# Patient Record
Sex: MALE | Race: WHITE | Employment: FULL TIME | URBAN - METROPOLITAN AREA
[De-identification: names, ages, dates, MRNs, and addresses within clinical notes are randomized per-mention and may not be internally consistent; named-entity substitution may affect disease eponyms.]

---

## 2020-03-05 ENCOUNTER — HOSPITAL ENCOUNTER (INPATIENT)
Age: 55
LOS: 2 days | Discharge: HOME | DRG: 603 | End: 2020-03-07
Attending: INTERNAL MEDICINE | Admitting: INTERNAL MEDICINE
Payer: COMMERCIAL

## 2020-03-05 ENCOUNTER — APPOINTMENT (OUTPATIENT)
Dept: ULTRASOUND IMAGING | Age: 55
End: 2020-03-05
Payer: COMMERCIAL

## 2020-03-05 ENCOUNTER — HOSPITAL ENCOUNTER (EMERGENCY)
Age: 55
Discharge: ANOTHER HOSPITAL | End: 2020-03-05
Attending: EMERGENCY MEDICINE
Payer: COMMERCIAL

## 2020-03-05 VITALS
WEIGHT: 315 LBS | OXYGEN SATURATION: 97 % | RESPIRATION RATE: 16 BRPM | TEMPERATURE: 99 F | BODY MASS INDEX: 45.1 KG/M2 | SYSTOLIC BLOOD PRESSURE: 108 MMHG | HEART RATE: 84 BPM | DIASTOLIC BLOOD PRESSURE: 62 MMHG | HEIGHT: 70 IN

## 2020-03-05 PROBLEM — E11.65 TYPE 2 DIABETES MELLITUS WITH HYPERGLYCEMIA (HCC): Status: ACTIVE | Noted: 2020-03-05

## 2020-03-05 PROBLEM — E78.5 HYPERLIPIDEMIA: Status: ACTIVE | Noted: 2020-03-05

## 2020-03-05 PROBLEM — E66.01 CLASS 3 SEVERE OBESITY WITH SERIOUS COMORBIDITY AND BODY MASS INDEX (BMI) OF 45.0 TO 49.9 IN ADULT (HCC): Status: ACTIVE | Noted: 2020-03-05

## 2020-03-05 PROBLEM — L03.90 CELLULITIS: Status: ACTIVE | Noted: 2020-03-05

## 2020-03-05 PROBLEM — I10 ESSENTIAL HYPERTENSION: Status: ACTIVE | Noted: 2020-03-05

## 2020-03-05 PROBLEM — E78.2 MIXED HYPERLIPIDEMIA: Status: ACTIVE | Noted: 2020-03-05

## 2020-03-05 PROBLEM — E66.813 CLASS 3 SEVERE OBESITY WITH SERIOUS COMORBIDITY AND BODY MASS INDEX (BMI) OF 45.0 TO 49.9 IN ADULT: Status: ACTIVE | Noted: 2020-03-05

## 2020-03-05 LAB
ABSOLUTE EOS #: 0.08 K/UL (ref 0–0.4)
ABSOLUTE IMMATURE GRANULOCYTE: ABNORMAL K/UL (ref 0–0.3)
ABSOLUTE LYMPH #: 0.32 K/UL (ref 1–4.8)
ABSOLUTE MONO #: 0.24 K/UL (ref 0.1–0.8)
ANION GAP SERPL CALCULATED.3IONS-SCNC: 15 MMOL/L (ref 9–17)
BASOPHILS # BLD: 0 % (ref 0–2)
BASOPHILS ABSOLUTE: 0 K/UL (ref 0–0.2)
BUN BLDV-MCNC: 18 MG/DL (ref 6–20)
BUN/CREAT BLD: ABNORMAL (ref 9–20)
CALCIUM SERPL-MCNC: 8.9 MG/DL (ref 8.6–10.4)
CHLORIDE BLD-SCNC: 96 MMOL/L (ref 98–107)
CO2: 23 MMOL/L (ref 20–31)
CREAT SERPL-MCNC: 0.93 MG/DL (ref 0.7–1.2)
DIFFERENTIAL TYPE: ABNORMAL
EOSINOPHILS RELATIVE PERCENT: 1 % (ref 1–4)
GFR AFRICAN AMERICAN: >60 ML/MIN
GFR NON-AFRICAN AMERICAN: >60 ML/MIN
GFR SERPL CREATININE-BSD FRML MDRD: ABNORMAL ML/MIN/{1.73_M2}
GFR SERPL CREATININE-BSD FRML MDRD: ABNORMAL ML/MIN/{1.73_M2}
GLUCOSE BLD-MCNC: 182 MG/DL (ref 75–110)
GLUCOSE BLD-MCNC: 204 MG/DL (ref 75–110)
GLUCOSE BLD-MCNC: 331 MG/DL (ref 70–99)
HCT VFR BLD CALC: 42.1 % (ref 41–53)
HEMOGLOBIN: 14.3 G/DL (ref 13.5–17.5)
IMMATURE GRANULOCYTES: ABNORMAL %
LACTIC ACID: 1.4 MMOL/L (ref 0.5–2.2)
LYMPHOCYTES # BLD: 4 % (ref 24–44)
MCH RBC QN AUTO: 31.6 PG (ref 26–34)
MCHC RBC AUTO-ENTMCNC: 34 G/DL (ref 31–37)
MCV RBC AUTO: 92.9 FL (ref 80–100)
MONOCYTES # BLD: 3 % (ref 1–7)
MORPHOLOGY: NORMAL
NRBC AUTOMATED: ABNORMAL PER 100 WBC
PDW BLD-RTO: 14.1 % (ref 12.5–15.4)
PLATELET # BLD: 159 K/UL (ref 140–450)
PLATELET ESTIMATE: ABNORMAL
PMV BLD AUTO: 7.4 FL (ref 6–12)
POTASSIUM SERPL-SCNC: 3.9 MMOL/L (ref 3.7–5.3)
RBC # BLD: 4.53 M/UL (ref 4.5–5.9)
RBC # BLD: ABNORMAL 10*6/UL
SEG NEUTROPHILS: 92 % (ref 36–66)
SEGMENTED NEUTROPHILS ABSOLUTE COUNT: 7.26 K/UL (ref 1.8–7.7)
SODIUM BLD-SCNC: 134 MMOL/L (ref 135–144)
VANCOMYCIN TROUGH DATE LAST DOSE: ABNORMAL
VANCOMYCIN TROUGH DOSE AMOUNT: ABNORMAL
VANCOMYCIN TROUGH TIME LAST DOSE: ABNORMAL
VANCOMYCIN TROUGH: 6.4 UG/ML (ref 10–20)
WBC # BLD: 7.9 K/UL (ref 3.5–11)
WBC # BLD: ABNORMAL 10*3/UL

## 2020-03-05 PROCEDURE — 93971 EXTREMITY STUDY: CPT

## 2020-03-05 PROCEDURE — 96365 THER/PROPH/DIAG IV INF INIT: CPT

## 2020-03-05 PROCEDURE — 96366 THER/PROPH/DIAG IV INF ADDON: CPT

## 2020-03-05 PROCEDURE — 1200000000 HC SEMI PRIVATE

## 2020-03-05 PROCEDURE — 82947 ASSAY GLUCOSE BLOOD QUANT: CPT

## 2020-03-05 PROCEDURE — 90686 IIV4 VACC NO PRSV 0.5 ML IM: CPT | Performed by: INTERNAL MEDICINE

## 2020-03-05 PROCEDURE — 6370000000 HC RX 637 (ALT 250 FOR IP): Performed by: EMERGENCY MEDICINE

## 2020-03-05 PROCEDURE — 2580000003 HC RX 258: Performed by: EMERGENCY MEDICINE

## 2020-03-05 PROCEDURE — 6370000000 HC RX 637 (ALT 250 FOR IP): Performed by: INTERNAL MEDICINE

## 2020-03-05 PROCEDURE — 99284 EMERGENCY DEPT VISIT MOD MDM: CPT

## 2020-03-05 PROCEDURE — G0008 ADMIN INFLUENZA VIRUS VAC: HCPCS | Performed by: INTERNAL MEDICINE

## 2020-03-05 PROCEDURE — 2580000003 HC RX 258: Performed by: NURSE PRACTITIONER

## 2020-03-05 PROCEDURE — 99222 1ST HOSP IP/OBS MODERATE 55: CPT | Performed by: NURSE PRACTITIONER

## 2020-03-05 PROCEDURE — 80048 BASIC METABOLIC PNL TOTAL CA: CPT

## 2020-03-05 PROCEDURE — 6360000002 HC RX W HCPCS: Performed by: INTERNAL MEDICINE

## 2020-03-05 PROCEDURE — 80202 ASSAY OF VANCOMYCIN: CPT

## 2020-03-05 PROCEDURE — 83605 ASSAY OF LACTIC ACID: CPT

## 2020-03-05 PROCEDURE — 36415 COLL VENOUS BLD VENIPUNCTURE: CPT

## 2020-03-05 PROCEDURE — 6370000000 HC RX 637 (ALT 250 FOR IP): Performed by: NURSE PRACTITIONER

## 2020-03-05 PROCEDURE — 85025 COMPLETE CBC W/AUTO DIFF WBC: CPT

## 2020-03-05 PROCEDURE — 87040 BLOOD CULTURE FOR BACTERIA: CPT

## 2020-03-05 PROCEDURE — 6360000002 HC RX W HCPCS: Performed by: NURSE PRACTITIONER

## 2020-03-05 PROCEDURE — 6360000002 HC RX W HCPCS: Performed by: EMERGENCY MEDICINE

## 2020-03-05 PROCEDURE — 96375 TX/PRO/DX INJ NEW DRUG ADDON: CPT

## 2020-03-05 PROCEDURE — 96367 TX/PROPH/DG ADDL SEQ IV INF: CPT

## 2020-03-05 RX ORDER — NICOTINE POLACRILEX 4 MG
15 LOZENGE BUCCAL PRN
Status: DISCONTINUED | OUTPATIENT
Start: 2020-03-05 | End: 2020-03-07 | Stop reason: HOSPADM

## 2020-03-05 RX ORDER — MORPHINE SULFATE 4 MG/ML
4 INJECTION, SOLUTION INTRAMUSCULAR; INTRAVENOUS ONCE
Status: COMPLETED | OUTPATIENT
Start: 2020-03-05 | End: 2020-03-05

## 2020-03-05 RX ORDER — LISINOPRIL 20 MG/1
20 TABLET ORAL DAILY
Status: DISCONTINUED | OUTPATIENT
Start: 2020-03-05 | End: 2020-03-07 | Stop reason: HOSPADM

## 2020-03-05 RX ORDER — TRAMADOL HYDROCHLORIDE 50 MG/1
50 TABLET ORAL EVERY 6 HOURS PRN
Status: DISCONTINUED | OUTPATIENT
Start: 2020-03-05 | End: 2020-03-07 | Stop reason: HOSPADM

## 2020-03-05 RX ORDER — POLYETHYLENE GLYCOL 3350 17 G/17G
17 POWDER, FOR SOLUTION ORAL DAILY PRN
Status: DISCONTINUED | OUTPATIENT
Start: 2020-03-05 | End: 2020-03-07 | Stop reason: HOSPADM

## 2020-03-05 RX ORDER — PROMETHAZINE HYDROCHLORIDE 25 MG/1
12.5 TABLET ORAL EVERY 6 HOURS PRN
Status: DISCONTINUED | OUTPATIENT
Start: 2020-03-05 | End: 2020-03-07 | Stop reason: HOSPADM

## 2020-03-05 RX ORDER — ACETAMINOPHEN 325 MG/1
650 TABLET ORAL EVERY 6 HOURS PRN
Status: DISCONTINUED | OUTPATIENT
Start: 2020-03-05 | End: 2020-03-07 | Stop reason: HOSPADM

## 2020-03-05 RX ORDER — SODIUM CHLORIDE 0.9 % (FLUSH) 0.9 %
10 SYRINGE (ML) INJECTION EVERY 12 HOURS SCHEDULED
Status: DISCONTINUED | OUTPATIENT
Start: 2020-03-05 | End: 2020-03-07 | Stop reason: HOSPADM

## 2020-03-05 RX ORDER — MAGNESIUM SULFATE 1 G/100ML
1 INJECTION INTRAVENOUS PRN
Status: DISCONTINUED | OUTPATIENT
Start: 2020-03-05 | End: 2020-03-07 | Stop reason: HOSPADM

## 2020-03-05 RX ORDER — ACETAMINOPHEN 650 MG/1
650 SUPPOSITORY RECTAL EVERY 6 HOURS PRN
Status: DISCONTINUED | OUTPATIENT
Start: 2020-03-05 | End: 2020-03-07 | Stop reason: HOSPADM

## 2020-03-05 RX ORDER — ONDANSETRON 2 MG/ML
4 INJECTION INTRAMUSCULAR; INTRAVENOUS EVERY 6 HOURS PRN
Status: DISCONTINUED | OUTPATIENT
Start: 2020-03-05 | End: 2020-03-07 | Stop reason: HOSPADM

## 2020-03-05 RX ORDER — CIPROFLOXACIN 500 MG/1
500 TABLET, FILM COATED ORAL 2 TIMES DAILY
Status: ON HOLD | COMMUNITY
End: 2020-03-06 | Stop reason: ALTCHOICE

## 2020-03-05 RX ORDER — ATORVASTATIN CALCIUM 40 MG/1
40 TABLET, FILM COATED ORAL NIGHTLY
Status: DISCONTINUED | OUTPATIENT
Start: 2020-03-05 | End: 2020-03-07 | Stop reason: HOSPADM

## 2020-03-05 RX ORDER — NICOTINE 21 MG/24HR
1 PATCH, TRANSDERMAL 24 HOURS TRANSDERMAL DAILY PRN
Status: DISCONTINUED | OUTPATIENT
Start: 2020-03-05 | End: 2020-03-05

## 2020-03-05 RX ORDER — ACETAMINOPHEN 500 MG
1000 TABLET ORAL ONCE
Status: COMPLETED | OUTPATIENT
Start: 2020-03-05 | End: 2020-03-05

## 2020-03-05 RX ORDER — SODIUM CHLORIDE 9 MG/ML
INJECTION, SOLUTION INTRAVENOUS CONTINUOUS
Status: DISCONTINUED | OUTPATIENT
Start: 2020-03-05 | End: 2020-03-05 | Stop reason: HOSPADM

## 2020-03-05 RX ORDER — POTASSIUM CHLORIDE 20 MEQ/1
40 TABLET, EXTENDED RELEASE ORAL PRN
Status: DISCONTINUED | OUTPATIENT
Start: 2020-03-05 | End: 2020-03-07 | Stop reason: HOSPADM

## 2020-03-05 RX ORDER — DEXTROSE MONOHYDRATE 25 G/50ML
12.5 INJECTION, SOLUTION INTRAVENOUS PRN
Status: DISCONTINUED | OUTPATIENT
Start: 2020-03-05 | End: 2020-03-07 | Stop reason: HOSPADM

## 2020-03-05 RX ORDER — SODIUM CHLORIDE 9 MG/ML
INJECTION, SOLUTION INTRAVENOUS CONTINUOUS
Status: DISCONTINUED | OUTPATIENT
Start: 2020-03-05 | End: 2020-03-07 | Stop reason: HOSPADM

## 2020-03-05 RX ORDER — POTASSIUM CHLORIDE 7.45 MG/ML
10 INJECTION INTRAVENOUS PRN
Status: DISCONTINUED | OUTPATIENT
Start: 2020-03-05 | End: 2020-03-07 | Stop reason: HOSPADM

## 2020-03-05 RX ORDER — SODIUM CHLORIDE 0.9 % (FLUSH) 0.9 %
10 SYRINGE (ML) INJECTION PRN
Status: DISCONTINUED | OUTPATIENT
Start: 2020-03-05 | End: 2020-03-07 | Stop reason: HOSPADM

## 2020-03-05 RX ORDER — DEXTROSE MONOHYDRATE 50 MG/ML
100 INJECTION, SOLUTION INTRAVENOUS PRN
Status: DISCONTINUED | OUTPATIENT
Start: 2020-03-05 | End: 2020-03-07 | Stop reason: HOSPADM

## 2020-03-05 RX ORDER — LISINOPRIL 20 MG/1
20 TABLET ORAL DAILY
COMMUNITY

## 2020-03-05 RX ORDER — ATORVASTATIN CALCIUM 40 MG/1
1 TABLET, FILM COATED ORAL DAILY
COMMUNITY

## 2020-03-05 RX ADMIN — LISINOPRIL 20 MG: 20 TABLET ORAL at 18:22

## 2020-03-05 RX ADMIN — VANCOMYCIN HYDROCHLORIDE 1000 MG: 1 INJECTION, POWDER, LYOPHILIZED, FOR SOLUTION INTRAVENOUS at 13:10

## 2020-03-05 RX ADMIN — INFLUENZA A VIRUS A/BRISBANE/02/2018 IVR-190 (H1N1) ANTIGEN (PROPIOLACTONE INACTIVATED), INFLUENZA A VIRUS A/KANSAS/14/2017 X-327 (H3N2) ANTIGEN (PROPIOLACTONE INACTIVATED), INFLUENZA B VIRUS B/MARYLAND/15/2016 ANTIGEN (PROPIOLACTONE INACTIVATED), INFLUENZA B VIRUS B/PHUKET/3073/2013 BVR-1B ANTIGEN (PROPIOLACTONE INACTIVATED) 0.5 ML: 15; 15; 15; 15 INJECTION, SUSPENSION INTRAMUSCULAR at 21:21

## 2020-03-05 RX ADMIN — DEXTROSE MONOHYDRATE 1 G: 5 INJECTION INTRAVENOUS at 10:37

## 2020-03-05 RX ADMIN — ACETAMINOPHEN 1000 MG: 500 TABLET ORAL at 10:24

## 2020-03-05 RX ADMIN — ENOXAPARIN SODIUM 40 MG: 40 INJECTION SUBCUTANEOUS at 18:22

## 2020-03-05 RX ADMIN — VANCOMYCIN HYDROCHLORIDE 2500 MG: 5 INJECTION, POWDER, LYOPHILIZED, FOR SOLUTION INTRAVENOUS at 18:51

## 2020-03-05 RX ADMIN — INSULIN LISPRO 2 UNITS: 100 INJECTION, SOLUTION INTRAVENOUS; SUBCUTANEOUS at 18:22

## 2020-03-05 RX ADMIN — INSULIN LISPRO 65 UNITS: 100 INJECTION, SUSPENSION SUBCUTANEOUS at 18:18

## 2020-03-05 RX ADMIN — ATORVASTATIN CALCIUM 40 MG: 40 TABLET, FILM COATED ORAL at 21:16

## 2020-03-05 RX ADMIN — SODIUM CHLORIDE: 9 INJECTION, SOLUTION INTRAVENOUS at 10:37

## 2020-03-05 RX ADMIN — INSULIN LISPRO 2 UNITS: 100 INJECTION, SOLUTION INTRAVENOUS; SUBCUTANEOUS at 21:16

## 2020-03-05 RX ADMIN — PIPERACILLIN SODIUM AND TAZOBACTAM SODIUM 4.5 G: 4; .5 INJECTION, POWDER, LYOPHILIZED, FOR SOLUTION INTRAVENOUS at 18:16

## 2020-03-05 RX ADMIN — SODIUM CHLORIDE: 9 INJECTION, SOLUTION INTRAVENOUS at 18:14

## 2020-03-05 RX ADMIN — MORPHINE SULFATE 4 MG: 4 INJECTION INTRAVENOUS at 12:19

## 2020-03-05 RX ADMIN — TRAMADOL HYDROCHLORIDE 50 MG: 50 TABLET, FILM COATED ORAL at 18:51

## 2020-03-05 RX ADMIN — PIPERACILLIN AND TAZOBACTAM 3.38 G: 3; .375 INJECTION, POWDER, LYOPHILIZED, FOR SOLUTION INTRAVENOUS at 23:32

## 2020-03-05 ASSESSMENT — PAIN DESCRIPTION - ORIENTATION
ORIENTATION: LEFT;LOWER
ORIENTATION: LEFT

## 2020-03-05 ASSESSMENT — PAIN DESCRIPTION - PROGRESSION
CLINICAL_PROGRESSION: NOT CHANGED
CLINICAL_PROGRESSION: NOT CHANGED

## 2020-03-05 ASSESSMENT — PAIN DESCRIPTION - PAIN TYPE
TYPE: ACUTE PAIN

## 2020-03-05 ASSESSMENT — PAIN DESCRIPTION - DESCRIPTORS
DESCRIPTORS: ACHING
DESCRIPTORS: ACHING;BURNING
DESCRIPTORS: ACHING;BURNING
DESCRIPTORS: ACHING

## 2020-03-05 ASSESSMENT — PAIN DESCRIPTION - LOCATION
LOCATION: LEG

## 2020-03-05 ASSESSMENT — PAIN SCALES - GENERAL
PAINLEVEL_OUTOF10: 5
PAINLEVEL_OUTOF10: 6
PAINLEVEL_OUTOF10: 6
PAINLEVEL_OUTOF10: 5
PAINLEVEL_OUTOF10: 4
PAINLEVEL_OUTOF10: 0

## 2020-03-05 ASSESSMENT — PAIN DESCRIPTION - ONSET
ONSET: ON-GOING
ONSET: ON-GOING

## 2020-03-05 ASSESSMENT — PAIN DESCRIPTION - FREQUENCY
FREQUENCY: CONTINUOUS
FREQUENCY: CONTINUOUS

## 2020-03-05 NOTE — ED NOTES
Pt transferred to [facility] via [place], IV infusing, pt belongings with pt. Pt stable upon transfer.       Staci Peterson RN  03/05/20 4525

## 2020-03-05 NOTE — ED PROVIDER NOTES
[id number] [facility] ED  [address] [facility] [address]  Phone: [phone]  Fax: Joelle West 1374      Pt Name: Jose Hernandez  MRN: [id number]  [place] 1965  Date of evaluation: 3/5/2020    CHIEF COMPLAINT       Chief Complaint   Patient presents with    Leg Pain    Leg Swelling       HISTORY OF PRESENT ILLNESS    Jose Hernandez is a 48 y.o. male who presents to the emergency department complaining of left leg pain and redness with warmth. History of cellulitis in the past.  Fever of as high as 102 at home 2 days ago. Symptoms started 2 days ago. Denies any recent travel or antibiotics. No sick contacts. No other symptoms. Denies chest pain or shortness of breath. REVIEW OF SYSTEMS       Constitutional: Positive fevers no chills  HEENT: No sore throat, rhinorrhea, or earache   Eyes: No blurry vision or double vision no drainage   Cardiovascular: No chest pain or tachycardia   Respiratory: No wheezing or shortness of breath no cough   Gastrointestinal: No nausea, vomiting, diarrhea, constipation, or abdominal pain   : No hematuria or dysuria   Musculoskeletal: Positive left leg pain and swelling  Skin: Positive left leg redness  Neurological: No focal neurologic complaints, paresthesias, weakness, or headache     PAST MEDICAL HISTORY    has a past medical history of Chronic cellulitis, Diabetes mellitus ([address] Hyperlipidemia, and Hypertension. SURGICAL HISTORY      has no past surgical history on file.     CURRENT MEDICATIONS       Previous Medications    ATORVASTATIN (LIPITOR) 40 MG TABLET    Take 1 tablet by mouth daily    CANAGLIFLOZIN (INVOKANA) 300 MG TABS TABLET    Take 300 mg by mouth daily    CIPROFLOXACIN (CIPRO) 500 MG TABLET    Take 500 mg by mouth 2 times daily    INSULIN LISPRO PROT & LISPRO (HUMALOG MIX 50/50 KWIKPEN) (50-50) 100 UNIT PER ML SUPN INJECTION PEN    Inject 65 Units into the skin 3 times daily (with meals)    LISINOPRIL (PRINIVIL;ZESTRIL) 20 MG TABLET    Take 20 mg by mouth daily    SEMAGLUTIDE,0.25 OR 0.5MG/DOS, (OZEMPIC, 0.25 OR 0.5 MG/DOSE,) 2 MG/1.5ML SOPN    Inject 0.5 mg into the skin once a week wednesdays       ALLERGIES     has No Known Allergies. FAMILY HISTORY     has no family status information on file. family history is not on file. SOCIAL HISTORY      reports that he has never smoked. He has never used smokeless tobacco. He reports current alcohol use. He reports that he does not use drugs. PHYSICAL EXAM       ED Triage Vitals [03/05/20 0923]   BP Temp Temp Source Pulse Resp SpO2 Height Weight   123/61 99.7 °F (37.6 °C) Tympanic 113 16 97 % 5' 10\" (1.778 m) (!) 350 lb (158.8 kg)       Constitutional: Alert, oriented x3, nontoxic, answering questions appropriately, acting properly for age, in no acute distress   HEENT: Extraocular muscles intact, mucus membranes moist,   Neck: Trachea midline   Cardiovascular: Regular rhythm and rate no S3, S4, or murmurs   Respiratory: Clear to auscultation bilaterally no wheezes, rhonchi, rales, no respiratory distress no tachypnea no retractions no hypoxia  Gastrointestinal: Soft, nontender, nondistended, positive bowel sounds. No rebound, rigidity, or guarding. Musculoskeletal: Left lower extremity there is swelling to the calf and foot. Pedal pulses intact and capillary refill less than 2 seconds. Erythema from the knee distally to the ankle. Circumferential.  Warm to touch. Neurologic: Moving all 4 extremities without difficulty there are no gross focal neurologic deficits   Skin: Warm and dry       DIFFERENTIAL DIAGNOSIS/ MDM:     IV and antibiotics. He is a diabetic. Labs. Ultrasound rule out DVT. Admission.     DIAGNOSTIC RESULTS     EKG: All E[facility]'s [facility] who either signs or Co-signs this chart in the absence of a cardiologist.        Not indicated unless otherwise documented above    LABS:  Results for orders placed or performed during the hospital encounter of 03/05/20   CBC Auto Differential   Result Value Ref Range    WBC 7.9 3.5 - 11.0 k/uL    RBC 4.53 4.5 - 5.9 m/uL    Hemoglobin 14.3 13.5 - 17.5 g/dL    Hematocrit 42.1 41 - 53 %    MCV 92.9 80 - 100 fL    MCH 31.6 26 - 34 pg    MCHC 34.0 31 - 37 g/dL    RDW 14.1 12.5 - 15.4 %    Platelets 119 602 - 685 k/uL    MPV 7.4 6.0 - 12.0 fL    NRBC Automated NOT REPORTED per 100 WBC    Differential Type NOT REPORTED     Immature Granulocytes NOT REPORTED 0 %    Absolute Immature Granulocyte NOT REPORTED 0.00 - 0.30 k/uL    WBC Morphology NOT REPORTED     RBC Morphology NOT REPORTED     Platelet Estimate NOT REPORTED     Seg Neutrophils 92 (H) 36 - 66 %    Lymphocytes 4 (L) 24 - 44 %    Monocytes 3 1 - 7 %    Eosinophils % 1 1 - 4 %    Basophils 0 0 - 2 %    Segs Absolute 7.26 1.8 - 7.7 k/uL    Absolute Lymph # 0.32 (L) 1.0 - 4.8 k/uL    Absolute Mono # 0.24 0.1 - 0.8 k/uL    Absolute Eos # 0.08 0.0 - 0.4 k/uL    Basophils Absolute 0.00 0.0 - 0.2 k/uL    Morphology Normal    Basic Metabolic Panel   Result Value Ref Range    Glucose 331 (H) 70 - 99 mg/dL    BUN 18 6 - 20 mg/dL    CREATININE 0.93 0.70 - 1.20 mg/dL    Bun/Cre Ratio NOT REPORTED 9 - 20    Calcium 8.9 8.6 - 10.4 mg/dL    Sodium 134 (L) 135 - 144 mmol/L    Potassium 3.9 3.7 - 5.3 mmol/L    Chloride 96 (L) 98 - 107 mmol/L    CO2 23 20 - 31 mmol/L    Anion Gap 15 9 - 17 mmol/L    GFR Non-African American >60 >60 mL/min    GFR African American >60 >60 mL/min    GFR Comment          GFR Staging NOT REPORTED    Lactic Acid   Result Value Ref Range    Lactic Acid 1.4 0.5 - 2.2 mmol/L       Not indicated unless otherwise documented above    RADIOLOGY:   I reviewed the radiologist interpretations:    US DUP LOWER EXTREMITY LEFT WILLIE   Final Result   No evidence of DVT in the left lower extremity.              Not indicated unless otherwise documented above    EMERGENCY DEPARTMENT COURSE:     The patient was given the following medications:  Orders Placed This Encounter   Medications    ceFAZolin (ANCEF) 1 g in dextrose 5 % 50 mL IVPB (mini-bag)    acetaminophen (TYLENOL) tablet 1,000 mg    0.9 % sodium chloride infusion    morphine injection 4 mg    vancomycin (VANCOCIN) 1,000 mg in dextrose 5 % 250 mL IVPB        Vitals:   -------------------------  /61   Pulse 113   Temp 99.7 °F (37.6 °C) (Tympanic)   Resp 16   Ht 5' 10\" (1.778 m)   Wt (!) 158.8 kg (350 lb)   SpO2 97%   BMI 50.22 kg/m²     11:50 AM ultrasound negative for DVT normal white blood cell count. Will admit for cellulitis. 12:45 PM no beds available in our facility or [facility]. Patient agreeable to transfer to [address] [place] [address] awaiting callback    1:35 PM discussed with Loli Perales practitioner covering for Dr. Yumiko Clark at [facility] who agrees to admission. Awaiting bed assignment and transport. I have reviewed the disposition diagnosis with the patient and or their family/guardian. I have answered their questions and given discharge instructions. They voiced understanding of these instructions and did not have any furtherquestions or complaints. CRITICAL CARE:    None    CONSULTS:    None    PROCEDURES:    None      OARRS Report if indicated             FINAL IMPRESSION      1. Cellulitis of left lower extremity          DISPOSITION/PLAN   DISPOSITION          CONDITION ON DISPOSITION: STABLE       PATIENT REFERRED TO:  No follow-up provider specified.     DISCHARGE MEDICATIONS:  New Prescriptions    No medications on file       (Please note that portions of thisnote were completed with a voice recognition program.  Efforts were made to edit the dictations but occasionally words are mis-transcribed.)    Gonzalez DO  Attending Emergency Physician       Elke Stock DO  03/05/20 3771

## 2020-03-05 NOTE — ED NOTES
Information received by [facility] for transport. Will notify Marilee Panda when bed received.      Anita García RN  03/05/20 0471

## 2020-03-05 NOTE — PROGRESS NOTES
Patient weight is 150 kg or greater. For prophylaxis with Enoxaparin, Pharmacy adjusted the dose to account for the patient's increased body weight in accordance with hospital approved protocol. The dose has been changed to 40 mg BID. Please contact pharmacy with any concerns @ [phone]. Thank you.    Aurora Farooq [facility]  3/5/2020 5:33 PM

## 2020-03-05 NOTE — ED NOTES
Call placed to [facility], spoke with Amada Laughter. No beds dylan at [facility], will try [facility] for admission.  Hospitalist paged for admission to [facility] for Cellulitis      Wood Rudd RN  03/05/20 0182

## 2020-03-06 PROBLEM — Z99.89 OBSTRUCTIVE SLEEP APNEA ON CPAP: Status: ACTIVE | Noted: 2020-03-06

## 2020-03-06 PROBLEM — G47.33 OBSTRUCTIVE SLEEP APNEA ON CPAP: Status: ACTIVE | Noted: 2020-03-06

## 2020-03-06 LAB
ANION GAP SERPL CALCULATED.3IONS-SCNC: 12 MMOL/L (ref 9–17)
BUN BLDV-MCNC: 17 MG/DL (ref 6–20)
BUN/CREAT BLD: 18 (ref 9–20)
CALCIUM SERPL-MCNC: 8.4 MG/DL (ref 8.6–10.4)
CHLORIDE BLD-SCNC: 99 MMOL/L (ref 98–107)
CO2: 20 MMOL/L (ref 20–31)
CREAT SERPL-MCNC: 0.96 MG/DL (ref 0.7–1.2)
GFR AFRICAN AMERICAN: >60 ML/MIN
GFR NON-AFRICAN AMERICAN: >60 ML/MIN
GFR SERPL CREATININE-BSD FRML MDRD: ABNORMAL ML/MIN/{1.73_M2}
GFR SERPL CREATININE-BSD FRML MDRD: ABNORMAL ML/MIN/{1.73_M2}
GLUCOSE BLD-MCNC: 110 MG/DL (ref 75–110)
GLUCOSE BLD-MCNC: 148 MG/DL (ref 75–110)
GLUCOSE BLD-MCNC: 161 MG/DL (ref 75–110)
GLUCOSE BLD-MCNC: 168 MG/DL (ref 70–99)
GLUCOSE BLD-MCNC: 94 MG/DL (ref 75–110)
HCT VFR BLD CALC: 38.6 % (ref 40.7–50.3)
HEMOGLOBIN: 12.9 G/DL (ref 13–17)
MCH RBC QN AUTO: 31.7 PG (ref 25.2–33.5)
MCHC RBC AUTO-ENTMCNC: 33.4 G/DL (ref 28.4–34.8)
MCV RBC AUTO: 94.8 FL (ref 82.6–102.9)
NRBC AUTOMATED: 0 PER 100 WBC
PDW BLD-RTO: 13.5 % (ref 11.8–14.4)
PLATELET # BLD: 140 K/UL (ref 138–453)
PMV BLD AUTO: 9.2 FL (ref 8.1–13.5)
POTASSIUM SERPL-SCNC: 3.7 MMOL/L (ref 3.7–5.3)
RBC # BLD: 4.07 M/UL (ref 4.21–5.77)
SODIUM BLD-SCNC: 131 MMOL/L (ref 135–144)
WBC # BLD: 6.2 K/UL (ref 3.5–11.3)

## 2020-03-06 PROCEDURE — 6360000002 HC RX W HCPCS: Performed by: INTERNAL MEDICINE

## 2020-03-06 PROCEDURE — 99232 SBSQ HOSP IP/OBS MODERATE 35: CPT | Performed by: INTERNAL MEDICINE

## 2020-03-06 PROCEDURE — 85027 COMPLETE CBC AUTOMATED: CPT

## 2020-03-06 PROCEDURE — 1200000000 HC SEMI PRIVATE

## 2020-03-06 PROCEDURE — 36415 COLL VENOUS BLD VENIPUNCTURE: CPT

## 2020-03-06 PROCEDURE — 6360000002 HC RX W HCPCS: Performed by: NURSE PRACTITIONER

## 2020-03-06 PROCEDURE — 80048 BASIC METABOLIC PNL TOTAL CA: CPT

## 2020-03-06 PROCEDURE — 2580000003 HC RX 258: Performed by: INTERNAL MEDICINE

## 2020-03-06 PROCEDURE — 6370000000 HC RX 637 (ALT 250 FOR IP): Performed by: NURSE PRACTITIONER

## 2020-03-06 PROCEDURE — 6370000000 HC RX 637 (ALT 250 FOR IP): Performed by: INTERNAL MEDICINE

## 2020-03-06 PROCEDURE — 82947 ASSAY GLUCOSE BLOOD QUANT: CPT

## 2020-03-06 PROCEDURE — 2580000003 HC RX 258: Performed by: NURSE PRACTITIONER

## 2020-03-06 PROCEDURE — 99221 1ST HOSP IP/OBS SF/LOW 40: CPT | Performed by: INTERNAL MEDICINE

## 2020-03-06 RX ADMIN — TRAMADOL HYDROCHLORIDE 50 MG: 50 TABLET, FILM COATED ORAL at 10:02

## 2020-03-06 RX ADMIN — DEXTROSE MONOHYDRATE 2 G: 50 INJECTION, SOLUTION INTRAVENOUS at 13:03

## 2020-03-06 RX ADMIN — INSULIN LISPRO 2 UNITS: 100 INJECTION, SOLUTION INTRAVENOUS; SUBCUTANEOUS at 13:06

## 2020-03-06 RX ADMIN — INSULIN LISPRO 65 UNITS: 100 INJECTION, SUSPENSION SUBCUTANEOUS at 17:37

## 2020-03-06 RX ADMIN — SODIUM CHLORIDE: 9 INJECTION, SOLUTION INTRAVENOUS at 10:03

## 2020-03-06 RX ADMIN — SODIUM CHLORIDE: 9 INJECTION, SOLUTION INTRAVENOUS at 22:26

## 2020-03-06 RX ADMIN — ENOXAPARIN SODIUM 40 MG: 40 INJECTION SUBCUTANEOUS at 21:05

## 2020-03-06 RX ADMIN — PIPERACILLIN AND TAZOBACTAM 3.38 G: 3; .375 INJECTION, POWDER, LYOPHILIZED, FOR SOLUTION INTRAVENOUS at 10:03

## 2020-03-06 RX ADMIN — ATORVASTATIN CALCIUM 40 MG: 40 TABLET, FILM COATED ORAL at 21:05

## 2020-03-06 RX ADMIN — TRAMADOL HYDROCHLORIDE 50 MG: 50 TABLET, FILM COATED ORAL at 21:05

## 2020-03-06 RX ADMIN — DEXTROSE MONOHYDRATE 2 G: 50 INJECTION, SOLUTION INTRAVENOUS at 21:05

## 2020-03-06 RX ADMIN — INSULIN LISPRO 65 UNITS: 100 INJECTION, SUSPENSION SUBCUTANEOUS at 09:59

## 2020-03-06 RX ADMIN — VANCOMYCIN HYDROCHLORIDE 1500 MG: 1 INJECTION, POWDER, LYOPHILIZED, FOR SOLUTION INTRAVENOUS at 03:55

## 2020-03-06 RX ADMIN — LISINOPRIL 20 MG: 20 TABLET ORAL at 10:07

## 2020-03-06 RX ADMIN — ENOXAPARIN SODIUM 40 MG: 40 INJECTION SUBCUTANEOUS at 10:02

## 2020-03-06 RX ADMIN — INSULIN LISPRO 65 UNITS: 100 INJECTION, SUSPENSION SUBCUTANEOUS at 13:04

## 2020-03-06 ASSESSMENT — PAIN SCALES - GENERAL
PAINLEVEL_OUTOF10: 5
PAINLEVEL_OUTOF10: 2
PAINLEVEL_OUTOF10: 4
PAINLEVEL_OUTOF10: 5
PAINLEVEL_OUTOF10: 6

## 2020-03-06 ASSESSMENT — ENCOUNTER SYMPTOMS
ALLERGIC/IMMUNOLOGIC NEGATIVE: 1
EYES NEGATIVE: 1
RESPIRATORY NEGATIVE: 1
COLOR CHANGE: 1
GASTROINTESTINAL NEGATIVE: 1

## 2020-03-06 ASSESSMENT — PAIN DESCRIPTION - PAIN TYPE
TYPE: ACUTE PAIN
TYPE: ACUTE PAIN

## 2020-03-06 ASSESSMENT — PAIN DESCRIPTION - DESCRIPTORS
DESCRIPTORS: CONSTANT;DULL
DESCRIPTORS: ACHING

## 2020-03-06 ASSESSMENT — PAIN DESCRIPTION - ORIENTATION
ORIENTATION: LEFT;LOWER
ORIENTATION: LEFT;LOWER

## 2020-03-06 ASSESSMENT — PAIN DESCRIPTION - LOCATION
LOCATION: LEG
LOCATION: LEG

## 2020-03-06 ASSESSMENT — PAIN DESCRIPTION - ONSET
ONSET: ON-GOING
ONSET: ON-GOING

## 2020-03-06 ASSESSMENT — PAIN DESCRIPTION - FREQUENCY
FREQUENCY: INTERMITTENT
FREQUENCY: CONTINUOUS

## 2020-03-06 ASSESSMENT — PAIN - FUNCTIONAL ASSESSMENT: PAIN_FUNCTIONAL_ASSESSMENT: ACTIVITIES ARE NOT PREVENTED

## 2020-03-06 ASSESSMENT — PAIN DESCRIPTION - PROGRESSION
CLINICAL_PROGRESSION: GRADUALLY IMPROVING
CLINICAL_PROGRESSION: GRADUALLY IMPROVING

## 2020-03-06 NOTE — CARE COORDINATION
Case Management Initial Discharge Plan  [facility],         Readmission Risk              Risk of Unplanned Readmission:        10             Met with:patient to discuss discharge plans. Information verified: address, contacts, phone number, , insurance Yes  PCP: Keli Ness MD  Date of last visit: 3 months    Insurance Provider: Tang Pierce    Discharge Planning  Current Residence:  house  Living Arrangements:  Spouse/Significant Other   Home has 1 stories/1 stairs to climb  Support Systems:  Spouse/Significant Other  Current Services PTA:   none Agency:  none  Patient able to perform ADL's:Independent  DME in home:  Glucometer, cpap  DME used to aid ambulation prior to admission:   none  DME used during admission:   none    Potential Assistance Needed:  N/A    Pharmacy:  [facility] on [address] in [place] Medications:  No  Does patient want to participate in local refill/ meds to beds program?  Yes    Patient agreeable to home care: No  [place] of choice provided:  n/a      Type of Home Care Services:  None  Patient expects to be discharged to:  home    Prior SNF/Rehab Placement and Facility: none  Agreeable to SNF/Rehab: No  [place] of choice provided: n/a   Evaluation: no    Expected Discharge date:  20  Follow Up Appointment: Best Day/ Time: Monday AM    Transportation provider: self  Transportation arrangements needed for discharge: No    Discharge Plan: Met with pt at bedside. Pt admitted with cellulitis, currently on iv Ancef. ID following. Pt lives with spouse and dtrs and is independent at home. Pt works full time. Plan for pt to return home. No dc needs anticipated.          Electronically signed by Kristy Fraga RN on 3/6/20 at 2:15 PM

## 2020-03-06 NOTE — PROGRESS NOTES
Transitions of Care Pharmacy Service   Medication Review    The patient's list of current home medications has been reviewed. Source(s) of information: Pill Bottles and Patient     Based on information provided by the above source(s), I have updated the patient's home med list as described below. Please review the ACTION REQUESTED section of this note for any discrepancies on current hospital orders. I changed or updated the following medications on the patient's home medication list:  Discontinued Removed Ciprofloxacin 500 mg PO BID. Patient had old prescription bottle with pills left that  in . He is not currently taking this medication. Recommended proper disposal of the old medication. Added None     Adjusted   None     Other Notes None           Please feel free to call me with any questions about this encounter. Thank you.     This note will be reviewed and co-signed by the Transitions of Care Pharmacist.    Narciso Savage PharmD student  Transitions of Care Pharmacy Service  Phone:  [phone]  Fax: [phone]

## 2020-03-06 NOTE — PROGRESS NOTES
[facility]    Progress Note    3/6/2020    9:14 AM    Name:   Morena Bo  MRN:     [id number]     Acct:      [de-identified]   Room:   2108/2[facility] Day:  1  Admit Date:  3/5/2020  5:22 PM    PCP:   Mikael Quezada MD  Code Status:  Full Code    Subjective:     C/C: Cellulitis    Interval History Status:    Feels his pain/redness/swelling of left leg is improving after coming to hospital  Denies any fever/chills  Denies nausea vomiting  Brief History:   Mornea Bo is a 48 y.o. Non-/non  male who presents with No chief complaint on file. and is admitted to the hospital for the management of Cellulitis.     Patient presented to the [facility] Emergency Department with the complaint of cellulitis in the left leg. He has a history of having cellulitis in this leg, but has not had an issue in several years. He states he noticed the issue on Tuesday and it continued to worsen. He has three prior admissions for cellulitis of the left leg. Upon assessment, the left lower leg is erythematous, edematous, and has some areas of weeping. He denies much pain to the area. He is being admitted for management of cellulitis of the left leg. Review of Systems:     Constitutional:  negative for chills, fevers, sweats  Respiratory:  negative for cough, dyspnea on exertion, shortness of breath, wheezing  Cardiovascular:  negative for chest pain, chest pressure/discomfort, lower extremity edema, palpitations  Gastrointestinal:  negative for abdominal pain, constipation, diarrhea, nausea, vomiting  Neurological:  negative for dizziness, headache    Medications:      Allergies:  No Known Allergies    Current Meds:   Scheduled Meds:    atorvastatin  40 mg Oral Nightly    insulin lispro prot & lispro  65 Units Subcutaneous TID     lisinopril  20 mg Oral Daily    sodium chloride flush  10 mL Intravenous 2 times per day    enoxaparin  40 mg Subcutaneous GFRAA >60 >60   CALCIUM 8.9 8.4*     Recent Labs     03/05/20  1813 03/05/20  2043 03/06/20  0652   POCGLU 182* 204* 161*     ABG:No results found for: POCPH, PHART, PH, POCPCO2, PHH3ZTR, PCO2, POCPO2, PO2ART, PO2, POCHCO3, YCG6ZUT, HCO3, NBEA, PBEA, BEART, BE, THGBART, THB, XKR9NDM, LHJY1HQR, Y4TMFFVB, O2SAT, FIO2  Lab Results   Component Value Date/Time    [facility] RIGHT ARM [facility] 03/05/2020 10:39 AM     Lab Results   Component Value Date/Time    CULTURE NO GROWTH 12 HOURS 03/05/2020 10:39 AM       Radiology:  Us Dup Lower Extremity Left Brett    Result Date: 3/5/2020  No evidence of DVT in the left lower extremity. Physical Examination:        General appearance:  alert, cooperative and no distress, morbidly obese  Mental Status:  oriented to person, place and time and normal affect  Lungs:  clear to auscultation bilaterally, normal effort  Heart:  regular rate and rhythm, no murmur  Abdomen:  soft, nontender, nondistended, normal bowel sounds, no masses, hepatomegaly, splenomegaly  Extremities:  + Edema and redness of left lower extremity but area has decreased compared to the demarcation done yesterday at admission,   Skin: Slight maceration and skin breakdown in the spaces of left foot    Assessment:        Hospital Problems           Last Modified POA    * (Principal) Cellulitis 3/6/2020 Yes    Mixed hyperlipidemia 3/6/2020 Yes    Essential hypertension 3/6/2020 Yes    Type 2 diabetes mellitus with hyperglycemia ([address] 3/6/2020 Yes    Class 3 severe obesity with serious comorbidity and body mass index (BMI) of 45.0 to 49.9 in adult ([address] 3/5/2020 Yes    Obstructive sleep apnea on CPAP 3/6/2020 Yes          Plan:        1. IV antibiotics in consultation with ID to be continued  2. Pain control  3. Diabetes controlled with home dose of insulins  4. DVT prophylaxis  5.  Keep left leg raised    Tru Jesus MD  3/6/2020  9:14 AM

## 2020-03-06 NOTE — PLAN OF CARE
Fall precautions in place Remains alert and oriented Up per self with steady gait  Blood sugars 165 and 148 with coverage and 50/50 insulin  Redness decreased to left leg IV antibiotic changed to Ancef   Afebrile  Takes ultram once per shift for pain  Tolerating 100% o  Problem: Nutritional:  Goal: Maintenance of adequate nutrition will improve  Description  Maintenance of adequate nutrition will improve  Outcome: Ongoing     Problem: Metabolic:  Goal: Ability to maintain appropriate glucose levels will improve  Description  Ability to maintain appropriate glucose levels will improve  3/6/2020 1331 by Yanna Delatorre RN  Outcome: Ongoing  3/6/2020 0328 by Adriana Pham RN  Outcome: Ongoing     Problem: Skin Integrity - Impaired:  Goal: Absence of new skin breakdown  Description  Absence of new skin breakdown  3/6/2020 0328 by Adriana Pham RN  Outcome: Ongoing     Problem: Skin Integrity - Impaired:  Goal: Will show no infection signs and symptoms  Description  Will show no infection signs and symptoms  3/6/2020 1331 by Yanna Delatorre RN  Outcome: Ongoing  3/6/2020 0328 by Adriana Pham RN  Outcome: Ongoing     Problem: Pain:  Goal: Pain level will decrease  Description  Pain level will decrease  3/6/2020 1331 by Yanna Delatorre RN  Outcome: Ongoing  3/6/2020 0328 by Adriana Pham RN  Outcome: Ongoing     Problem: Mobility - Impaired:  Goal: Mobility will improve to maximum level  Description  Mobility will improve to maximum level  3/6/2020 1331 by Yanna Delatorre RN  Outcome: Ongoing  3/6/2020 0328 by Adriana Pham RN  Outcome: Ongoing     Problem:  Body Temperature - Imbalanced:  Goal: Ability to maintain a body temperature in the normal range will improve  Description  Ability to maintain a body temperature in the normal range will improve  3/6/2020 1331 by Yanna Delatorre RN  Outcome: Ongoing  3/6/2020 0328 by Adriana Pham RN  Outcome: Ongoing     Problem: Pain:  Goal: Pain level

## 2020-03-06 NOTE — CONSULTS
[facility]  Initial Consult Note  Date: 3/6/2020    Hospital day :1     Impression:   1. Left lower extremity cellulitis and the clinical findings are consistent with a streptococcal infection/erysipelas  2. Venous stasis dermatitis in bilateral lower extremities  3. Morbid obesity  4. Diabetes mellitus type 2    Recommendations   · I will narrow the antimicrobial therapy from Zosyn and vancomycin to cefazolin  · I suspect that the patient should be able to switch to oral antimicrobial therapy for discharge in the next 24 to 48 hours    Chief complaint/reason for consultation:   Cellulitis    History of Present Illness:   Pedro Treadwell is a 48y.o.-year-old male who was initially admitted on 3/5/2020. Za Avila has a history of diabetes mellitus type 2, morbid obesity, hypertension, hyperlipidemia, venous stasis dermatitis in the lower extremities bilaterally. He reports that on Tuesday he started to notice some redness in the left lower extremity and he did have some associated fever up to 102 degrees. The redness continue to progress and he had increasing swelling and pain in the left lower extremity so he ended up coming into the emergency room for evaluation. He does not report any associated nausea, vomiting, abdominal pain, diarrhea. No dysuria or frequency. Was seen in the emergency room admitted started on broad-spectrum antimicrobial therapy for cellulitis and I was asked to evaluate and help with antibiotic choice. The patient does report a prior episode of cellulitis about 8 to 10 years ago. I have personally reviewed the past medical history, past surgical history, medications, social history, and family history, and I have updated the database accordingly. Past Medical History:     Past Medical History:   Diagnosis Date    Chronic cellulitis     Diabetes mellitus ([facility] [facility] [address]     Hyperlipidemia     Hypertension      Past Surgical  History:   History reviewed.  No pertinent surgical history.   Medications:      atorvastatin  40 mg Oral Nightly    insulin lispro prot & lispro  65 Units Subcutaneous TID WC    lisinopril  20 mg Oral Daily    sodium chloride flush  10 mL Intravenous 2 times per day    enoxaparin  40 mg Subcutaneous BID    piperacillin-tazobactam  3.375 g Intravenous Q8H    vancomycin  1,500 mg Intravenous Q8H    insulin lispro  0-12 Units Subcutaneous TID WC    insulin lispro  0-6 Units Subcutaneous Nightly    vancomycin (VANCOCIN) intermittent dosing (placeholder)   Other RX Placeholder     Social History:     Social History     Socioeconomic History    Marital status:      Spouse name: Not on file    Number of children: Not on file    Years of education: Not on file    Highest education level: Not on file   Occupational History    Not on file   Social Needs    Financial resource strain: Not on file    Food insecurity:     Worry: Not on file     Inability: Not on file    Transportation needs:     Medical: Not on file     Non-medical: Not on file   Tobacco Use    Smoking status: Never Smoker    Smokeless tobacco: Never Used   Substance and Sexual Activity    Alcohol use: Yes     Comment: SOCIALLY    Drug use: Never    Sexual activity: Not on file   Lifestyle    Physical activity:     Days per week: Not on file     Minutes per session: Not on file    Stress: Not on file   Relationships    Social connections:     Talks on phone: Not on file     Gets together: Not on file     Attends Congregation service: Not on file     Active member of club or organization: Not on file     Attends meetings of clubs or organizations: Not on file     Relationship status: Not on file    Intimate partner violence:     Fear of current or ex partner: Not on file     Emotionally abused: Not on file     Physically abused: Not on file     Forced sexual activity: Not on file   Other Topics Concern    Not on file   Social History Narrative    Not on file     Family

## 2020-03-06 NOTE — H&P
[address] [facility]    HISTORY AND PHYSICAL EXAMINATION            Date:   3/6/2020  Patient name:  Pedro Treadwell  Date of admission:  3/5/2020  5:22 PM  MRN:   [id number]  Account:  [de-identified]  YOB: 1965  PCP:    Tim Parr MD  Room:   2108/2108-01  Code Status:    Full Code    Chief Complaint:     Cellulitis    History Obtained From:     patient, electronic medical record    History of Present Illness:     Pedro Treadwell is a 48 y.o. Non-/non  male who presents with No chief complaint on file. and is admitted to the hospital for the management of Cellulitis. Patient presented to the [facility] Emergency Department with the complaint of cellulitis in the left leg. He has a history of having cellulitis in this leg, but has not had an issue in several years. He states he noticed the issue on Tuesday and it continued to worsen. He has three prior admissions for cellulitis of the left leg. Upon assessment, the left lower leg is erythematous, edematous, and has some areas of weeping. He denies much pain to the area. He is being admitted for management of cellulitis of the left leg. Past Medical History:     Past Medical History:   Diagnosis Date    Chronic cellulitis     Diabetes mellitus ([facility] [address]     Hyperlipidemia     Hypertension         Past Surgical History:     History reviewed. No pertinent surgical history. Medications Prior to Admission:     Prior to Admission medications    Medication Sig Start Date End Date Taking?  Authorizing Provider   canagliflozin (INVOKANA) 300 MG TABS tablet Take 300 mg by mouth daily   Yes Historical Provider, MD   atorvastatin (LIPITOR) 40 MG tablet Take 1 tablet by mouth daily    Historical Provider, MD   Semaglutide,0.25 or 0.5MG/DOS, (OZEMPIC, 0.25 OR 0.5 MG/DOSE,) 2 MG/1.5ML SOPN Inject 0.5 mg into the skin once a week wednesdays    Historical Provider, MD   ciprofloxacin (CIPRO) 500 MG tablet Take 500 mg by mouth 2 times daily    Historical Provider, MD   lisinopril (PRINIVIL;ZESTRIL) 20 MG tablet Take 20 mg by mouth daily    Historical Provider, MD   insulin lispro prot & lispro (HUMALOG MIX 50/50 KWIKPEN) (50-50) 100 UNIT per ML SUPN injection pen Inject 65 Units into the skin 3 times daily (with meals)    Historical Provider, MD        Allergies:     Patient has no known allergies. Social History:     Tobacco:    reports that he has never smoked. He has never used smokeless tobacco.  Alcohol:      reports current alcohol use. Drug Use:  reports no history of drug use. Family History:     Family History   Problem Relation Age of Onset    Diabetes Mother     No Known Problems Father        Review of Systems:     Positive and Negative as described in HPI. Review of Systems   Constitutional: Negative. HENT: Negative. Eyes: Negative. Respiratory: Negative. Cardiovascular: Positive for leg swelling. Gastrointestinal: Negative. Endocrine: Negative. Genitourinary: Negative. Musculoskeletal: Negative. Skin: Positive for color change. Negative for pallor, rash and wound. Allergic/Immunologic: Negative. Neurological: Negative. Hematological: Negative. Psychiatric/Behavioral: Negative. Physical Exam:   /69   Pulse 101   Temp 98.5 °F (36.9 °C) (Oral)   Resp 20   Ht 5' 10\" (1.778 m)   Wt (!) 344 lb 12.8 oz (156.4 kg)   SpO2 94%   BMI 49.47 kg/m²   Temp (24hrs), Av °F (37.2 °C), Min:98.5 °F (36.9 °C), Max:99.7 °F (37.6 °C)    Recent Labs     20  1813 20  2043   POCGLU 182* 204*     No intake or output data in the 24 hours ending 20 0507    Physical Exam  Vitals signs and nursing note reviewed. Constitutional:       General: He is not in acute distress. Appearance: Normal appearance. He is obese. He is not ill-appearing, toxic-appearing or diaphoretic. HENT:      Head: Normocephalic and atraumatic.       Right deficit. Motor: No weakness. Coordination: Coordination normal.   Psychiatric:         Mood and Affect: Mood normal.         Behavior: Behavior normal.         Thought Content:  Thought content normal.         Judgment: Judgment normal.         Investigations:      Laboratory Testing:  Recent Results (from the past 24 hour(s))   CBC Auto Differential    Collection Time: 03/05/20 10:15 AM   Result Value Ref Range    WBC 7.9 3.5 - 11.0 k/uL    RBC 4.53 4.5 - 5.9 m/uL    Hemoglobin 14.3 13.5 - 17.5 g/dL    Hematocrit 42.1 41 - 53 %    MCV 92.9 80 - 100 fL    MCH 31.6 26 - 34 pg    MCHC 34.0 31 - 37 g/dL    RDW 14.1 12.5 - 15.4 %    Platelets 098 736 - 440 k/uL    MPV 7.4 6.0 - 12.0 fL    NRBC Automated NOT REPORTED per 100 WBC    Differential Type NOT REPORTED     Immature Granulocytes NOT REPORTED 0 %    Absolute Immature Granulocyte NOT REPORTED 0.00 - 0.30 k/uL    WBC Morphology NOT REPORTED     RBC Morphology NOT REPORTED     Platelet Estimate NOT REPORTED     Seg Neutrophils 92 (H) 36 - 66 %    Lymphocytes 4 (L) 24 - 44 %    Monocytes 3 1 - 7 %    Eosinophils % 1 1 - 4 %    Basophils 0 0 - 2 %    Segs Absolute 7.26 1.8 - 7.7 k/uL    Absolute Lymph # 0.32 (L) 1.0 - 4.8 k/uL    Absolute Mono # 0.24 0.1 - 0.8 k/uL    Absolute Eos # 0.08 0.0 - 0.4 k/uL    Basophils Absolute 0.00 0.0 - 0.2 k/uL    Morphology Normal    Basic Metabolic Panel    Collection Time: 03/05/20 10:15 AM   Result Value Ref Range    Glucose 331 (H) 70 - 99 mg/dL    BUN 18 6 - 20 mg/dL    CREATININE 0.93 0.70 - 1.20 mg/dL    Bun/Cre Ratio NOT REPORTED 9 - 20    Calcium 8.9 8.6 - 10.4 mg/dL    Sodium 134 (L) 135 - 144 mmol/L    Potassium 3.9 3.7 - 5.3 mmol/L    Chloride 96 (L) 98 - 107 mmol/L    CO2 23 20 - 31 mmol/L    Anion Gap 15 9 - 17 mmol/L    GFR Non-African American >60 >60 mL/min    GFR African American >60 >60 mL/min    GFR Comment          GFR Staging NOT REPORTED    Lactic Acid    Collection Time: 03/05/20 10:15 AM   Result Value Ref Range    Lactic Acid 1.4 0.5 - 2.2 mmol/L   Culture, Blood 1    Collection Time: 03/05/20 10:15 AM   Result Value Ref Range    Specimen Description . BLOOD     Special Requests L ARM 22ML     Culture NO GROWTH 5 HOURS    Culture, Blood 1    Collection Time: 03/05/20 10:39 AM   Result Value Ref Range    Specimen Description . BLOOD     Special Requests RIGHT ARM 20CC     Culture NO GROWTH 5 HOURS    POC Glucose Fingerstick    Collection Time: 03/05/20  6:13 PM   Result Value Ref Range    POC Glucose 182 (H) 75 - 110 mg/dL   Leslee Neth    Collection Time: 03/05/20  6:36 PM   Result Value Ref Range    Vancomycin Tr 6.4 (L) 10.0 - 20.0 ug/mL    Vancomycin Trough Dose amount NOT REPORTED     Vancomycin Trough Date last dose NOT REPORTED     Vancomycin Trough Time last dose NOT REPORTED    POC Glucose Fingerstick    Collection Time: 03/05/20  8:43 PM   Result Value Ref Range    POC Glucose 204 (H) 75 - 110 mg/dL       Imaging/Diagnostics:  Us Dup Lower Extremity Left Brett    Result Date: 3/5/2020  No evidence of DVT in the left lower extremity. Assessment :      Hospital Problems           Last Modified POA    * (Principal) Cellulitis 3/6/2020 Yes    Mixed hyperlipidemia 3/6/2020 Yes    Essential hypertension 3/6/2020 Yes    Type 2 diabetes mellitus with hyperglycemia ([address] 3/6/2020 Yes    Class 3 severe obesity with serious comorbidity and body mass index (BMI) of 45.0 to 49.9 in adult [facility]) 3/5/2020 Yes    Obstructive sleep apnea on CPAP 3/6/2020 Yes          Plan:     Patient status inpatient in the  Med/Surge    1. Admit to Med/Surg  2. Consult Infectious Disease  3. Labs: CBC, BMP, monitor BUN and creatinine  4. Wound culture  5. Antimicrobials: Vancomycin and Zosyn  6. Monitor Vancomycin trough  7. Glucose management  8. Continue home medications  9. ARIAN- CPAP dependent at night  10. Elevate left lower extremity  11. Pain management: Tramadol  12. Nausea management: Zofran and Phenergan  13.  DVT prophylaxis: Lovenox  14. IV hydration 75ml/hr  15. Replete electrolytes as needed    Consultations:   IP CONSULT TO INFECTIOUS DISEASES    Patient is admitted as inpatient status because of co-morbidities listed above, severity of signs and symptoms as outlined, requirement for current medical therapies and most importantly because of direct risk to patient if care not provided in a hospital setting.     JODI Yusuf - CNP  3/6/2020  5:07 AM    Copy sent to Dr. Mikael Quezada MD

## 2020-03-06 NOTE — PLAN OF CARE
Problem: Falls - Risk of:  Goal: Will remain free from falls  Description  Will remain free from falls  Outcome: Ongoing     Problem: Pain:  Goal: Pain level will decrease  Description  Pain level will decrease  Outcome: Ongoing     Problem: Discharge Planning:  Goal: Discharged to appropriate level of care  Description  Discharged to appropriate level of care  Outcome: Ongoing     Problem:  Body Temperature - Imbalanced:  Goal: Ability to maintain a body temperature in the normal range will improve  Description  Ability to maintain a body temperature in the normal range will improve  Outcome: Ongoing     Problem: Mobility - Impaired:  Goal: Mobility will improve to maximum level  Description  Mobility will improve to maximum level  Outcome: Ongoing     Problem: Pain:  Goal: Pain level will decrease  Description  Pain level will decrease  Outcome: Ongoing     Problem: Skin Integrity - Impaired:  Goal: Will show no infection signs and symptoms  Description  Will show no infection signs and symptoms  Outcome: Ongoing  Goal: Absence of new skin breakdown  Description  Absence of new skin breakdown  Outcome: Ongoing     Problem: Metabolic:  Goal: Ability to maintain appropriate glucose levels will improve  Description  Ability to maintain appropriate glucose levels will improve  Outcome: Ongoing

## 2020-03-07 VITALS
DIASTOLIC BLOOD PRESSURE: 69 MMHG | RESPIRATION RATE: 18 BRPM | BODY MASS INDEX: 45.1 KG/M2 | HEIGHT: 70 IN | HEART RATE: 83 BPM | SYSTOLIC BLOOD PRESSURE: 120 MMHG | WEIGHT: 315 LBS | TEMPERATURE: 98.2 F | OXYGEN SATURATION: 98 %

## 2020-03-07 LAB
ABSOLUTE EOS #: 0.13 K/UL (ref 0–0.44)
ABSOLUTE IMMATURE GRANULOCYTE: 0.04 K/UL (ref 0–0.3)
ABSOLUTE LYMPH #: 0.7 K/UL (ref 1.1–3.7)
ABSOLUTE MONO #: 0.44 K/UL (ref 0.1–1.2)
BASOPHILS # BLD: 1 % (ref 0–2)
BASOPHILS ABSOLUTE: 0.04 K/UL (ref 0–0.2)
DIFFERENTIAL TYPE: ABNORMAL
EOSINOPHILS RELATIVE PERCENT: 3 % (ref 1–4)
GLUCOSE BLD-MCNC: 106 MG/DL (ref 75–110)
GLUCOSE BLD-MCNC: 149 MG/DL (ref 75–110)
GLUCOSE BLD-MCNC: 158 MG/DL (ref 75–110)
HCT VFR BLD CALC: 37.8 % (ref 40.7–50.3)
HEMOGLOBIN: 12.6 G/DL (ref 13–17)
IMMATURE GRANULOCYTES: 1 %
LYMPHOCYTES # BLD: 16 % (ref 24–43)
MCH RBC QN AUTO: 31.5 PG (ref 25.2–33.5)
MCHC RBC AUTO-ENTMCNC: 33.3 G/DL (ref 28.4–34.8)
MCV RBC AUTO: 94.5 FL (ref 82.6–102.9)
MONOCYTES # BLD: 10 % (ref 3–12)
NRBC AUTOMATED: 0 PER 100 WBC
PDW BLD-RTO: 13.4 % (ref 11.8–14.4)
PLATELET # BLD: 153 K/UL (ref 138–453)
PLATELET ESTIMATE: ABNORMAL
PMV BLD AUTO: 8.8 FL (ref 8.1–13.5)
RBC # BLD: 4 M/UL (ref 4.21–5.77)
RBC # BLD: ABNORMAL 10*6/UL
SEG NEUTROPHILS: 69 % (ref 36–65)
SEGMENTED NEUTROPHILS ABSOLUTE COUNT: 3.05 K/UL (ref 1.5–8.1)
WBC # BLD: 4.4 K/UL (ref 3.5–11.3)
WBC # BLD: ABNORMAL 10*3/UL

## 2020-03-07 PROCEDURE — 99232 SBSQ HOSP IP/OBS MODERATE 35: CPT | Performed by: INTERNAL MEDICINE

## 2020-03-07 PROCEDURE — 6370000000 HC RX 637 (ALT 250 FOR IP): Performed by: NURSE PRACTITIONER

## 2020-03-07 PROCEDURE — 6360000002 HC RX W HCPCS: Performed by: NURSE PRACTITIONER

## 2020-03-07 PROCEDURE — 85025 COMPLETE CBC W/AUTO DIFF WBC: CPT

## 2020-03-07 PROCEDURE — 36415 COLL VENOUS BLD VENIPUNCTURE: CPT

## 2020-03-07 PROCEDURE — 2580000003 HC RX 258: Performed by: INTERNAL MEDICINE

## 2020-03-07 PROCEDURE — 82947 ASSAY GLUCOSE BLOOD QUANT: CPT

## 2020-03-07 PROCEDURE — 6360000002 HC RX W HCPCS: Performed by: INTERNAL MEDICINE

## 2020-03-07 RX ORDER — CEPHALEXIN 500 MG/1
1000 CAPSULE ORAL EVERY 6 HOURS SCHEDULED
Status: DISCONTINUED | OUTPATIENT
Start: 2020-03-07 | End: 2020-03-07 | Stop reason: HOSPADM

## 2020-03-07 RX ORDER — CEPHALEXIN 500 MG/1
1000 CAPSULE ORAL 4 TIMES DAILY
Qty: 28 CAPSULE | Refills: 1 | Status: SHIPPED | OUTPATIENT
Start: 2020-03-07 | End: 2020-03-14

## 2020-03-07 RX ORDER — TRAMADOL HYDROCHLORIDE 50 MG/1
50 TABLET ORAL EVERY 6 HOURS PRN
Qty: 10 TABLET | Refills: 0 | Status: SHIPPED | OUTPATIENT
Start: 2020-03-07 | End: 2020-03-10

## 2020-03-07 RX ORDER — INSULIN LISPRO 100 [IU]/ML
60 INJECTION, SUSPENSION SUBCUTANEOUS
Qty: 5 PEN | Refills: 3 | Status: SHIPPED | OUTPATIENT
Start: 2020-03-07

## 2020-03-07 RX ADMIN — ENOXAPARIN SODIUM 40 MG: 40 INJECTION SUBCUTANEOUS at 11:04

## 2020-03-07 RX ADMIN — INSULIN LISPRO 2 UNITS: 100 INJECTION, SOLUTION INTRAVENOUS; SUBCUTANEOUS at 17:42

## 2020-03-07 RX ADMIN — INSULIN LISPRO 2 UNITS: 100 INJECTION, SOLUTION INTRAVENOUS; SUBCUTANEOUS at 13:19

## 2020-03-07 RX ADMIN — DEXTROSE MONOHYDRATE 2 G: 50 INJECTION, SOLUTION INTRAVENOUS at 04:56

## 2020-03-07 RX ADMIN — ACETAMINOPHEN 650 MG: 325 TABLET ORAL at 14:51

## 2020-03-07 RX ADMIN — LISINOPRIL 20 MG: 20 TABLET ORAL at 13:20

## 2020-03-07 RX ADMIN — DEXTROSE MONOHYDRATE 2 G: 50 INJECTION, SOLUTION INTRAVENOUS at 13:21

## 2020-03-07 ASSESSMENT — PAIN DESCRIPTION - PROGRESSION

## 2020-03-07 ASSESSMENT — PAIN SCALES - GENERAL
PAINLEVEL_OUTOF10: 5
PAINLEVEL_OUTOF10: 2
PAINLEVEL_OUTOF10: 3
PAINLEVEL_OUTOF10: 3

## 2020-03-07 ASSESSMENT — PAIN DESCRIPTION - LOCATION
LOCATION: LEG
LOCATION: LEG

## 2020-03-07 ASSESSMENT — PAIN DESCRIPTION - ONSET
ONSET: ON-GOING
ONSET: ON-GOING

## 2020-03-07 ASSESSMENT — PAIN DESCRIPTION - PAIN TYPE
TYPE: ACUTE PAIN
TYPE: ACUTE PAIN

## 2020-03-07 ASSESSMENT — PAIN DESCRIPTION - ORIENTATION
ORIENTATION: LEFT;LOWER
ORIENTATION: LEFT;LOWER

## 2020-03-07 ASSESSMENT — PAIN DESCRIPTION - FREQUENCY
FREQUENCY: CONTINUOUS
FREQUENCY: CONTINUOUS

## 2020-03-07 ASSESSMENT — PAIN - FUNCTIONAL ASSESSMENT
PAIN_FUNCTIONAL_ASSESSMENT: ACTIVITIES ARE NOT PREVENTED
PAIN_FUNCTIONAL_ASSESSMENT: ACTIVITIES ARE NOT PREVENTED

## 2020-03-07 ASSESSMENT — PAIN DESCRIPTION - DESCRIPTORS
DESCRIPTORS: ACHING
DESCRIPTORS: ACHING

## 2020-03-07 NOTE — PLAN OF CARE
decrease  Description  Pain level will decrease  3/6/2020 2125 by Pedro Bowman RN  Outcome: Ongoing  Note:   Pain level assessment complete. Patient educated on pain scale and control interventions  PRN pain medication given per patient request-Ultram Given  Patient instructed to call out with new onset of pain or unrelieved pain  3/6/2020 1331 by Indu Nayak RN  Outcome: Ongoing  Goal: Control of acute pain  Description  Control of acute pain  Outcome: Ongoing  Goal: Control of chronic pain  Description  Control of chronic pain  Outcome: Ongoing     Problem: Skin Integrity - Impaired:  Goal: Will show no infection signs and symptoms  Description  Will show no infection signs and symptoms  3/6/2020 2125 by Pedro Bowman RN  Outcome: Ongoing  Note:       3/6/2020 1331 by Indu Nayak RN  Outcome: Ongoing  Goal: Absence of new skin breakdown  Description  Absence of new skin breakdown  Outcome: Ongoing     Problem:  Activity:  Goal: Risk for activity intolerance will decrease  Description  Risk for activity intolerance will decrease  Outcome: Ongoing     Problem: Coping:  Goal: Ability to adjust to condition or change in health will improve  Description  Ability to adjust to condition or change in health will improve  Outcome: Ongoing     Problem: Fluid Volume:  Goal: Ability to maintain a balanced intake and output will improve  Description  Ability to maintain a balanced intake and output will improve  Outcome: Ongoing     Problem: Health Behavior:  Goal: Ability to identify and utilize available resources and services will improve  Description  Ability to identify and utilize available resources and services will improve  3/6/2020 2125 by Pedro Bowman RN  Outcome: Ongoing  3/6/2020 1331 by Indu Nayak RN  Outcome: Ongoing  Goal: Ability to manage health-related needs will improve  Description  Ability to manage health-related needs will improve  Outcome: Ongoing     Problem: Metabolic:  Goal: Ability to maintain appropriate glucose levels will improve  Description  Ability to maintain appropriate glucose levels will improve  3/6/2020 2125 by Álvaro Marino RN  Outcome: Ongoing  3/6/2020 1331 by Nella Serrano RN  Outcome: Ongoing     Problem: Nutritional:  Goal: Maintenance of adequate nutrition will improve  Description  Maintenance of adequate nutrition will improve  3/6/2020 2125 by Álvaro Marino RN  Outcome: Ongoing  3/6/2020 1331 by Nella Serrano RN  Outcome: Ongoing  Goal: Progress toward achieving an optimal weight will improve  Description  Progress toward achieving an optimal weight will improve  Outcome: Ongoing     Problem: Physical Regulation:  Goal: Complications related to the disease process, condition or treatment will be avoided or minimized  Description  Complications related to the disease process, condition or treatment will be avoided or minimized  Outcome: Ongoing  Goal: Diagnostic test results will improve  Description  Diagnostic test results will improve  Outcome: Ongoing     Problem: Skin Integrity:  Goal: Risk for impaired skin integrity will decrease  Description  Risk for impaired skin integrity will decrease  Outcome: Ongoing     Problem: Tissue Perfusion:  Goal: Adequacy of tissue perfusion will improve  Description  Adequacy of tissue perfusion will improve  Outcome: Ongoing

## 2020-03-07 NOTE — DISCHARGE SUMMARY
[address] [facility]    Discharge Summary     Patient ID: Fiona Allen  :  1965   MRN: [id number]     ACCOUNT:  [de-identified]   Patient's PCP: Jm Tovar MD  Admit Date: 3/5/2020   Discharge Date: 3/7/2020     Length of Stay: 2  Code Status:  Full Code  Admitting Physician: Marcos Fernández MD  Discharge Physician: Marcos Fernández MD     Active Discharge Diagnoses:     Hospital Problem Lists:  Principal Problem:    Cellulitis  Active Problems:    Mixed hyperlipidemia    Essential hypertension    Type 2 diabetes mellitus with hyperglycemia (HCC)    Class 3 severe obesity with serious comorbidity and body mass index (BMI) of 45.0 to 49.9 in adult [facility])    Obstructive sleep apnea on CPAP  Resolved Problems:    * No resolved hospital problems. *      Admission Condition:  poor     Discharged Condition: good    Hospital Stay:   Admitting history:  Rubi Yu a 48 y.o. Non-/non  male who presents with No chief complaint on file.   and is admitted to the hospital for the management of Cellulitis.     Patient presented to the [facility] Emergency Department with the complaint of cellulitis in the left leg. He has a history of having cellulitis in this leg, but has not had an issue in several years. He states he noticed the issue on Tuesday and it continued to worsen. He has three prior admissions for cellulitis of the left leg. Upon assessment, the left lower leg is erythematous, edematous, and has some areas of weeping. He denies much pain to the area. He is being admitted for management of cellulitis of the left leg.     Hospital Course:   pain/redness/swelling of left leg has significantly improved  Denies any fever/chills  Denies nausea vomiting  Hospital Problems            Last Modified POA     * (Principal) Cellulitis 3/6/2020 Yes     Mixed hyperlipidemia 3/6/2020 Yes     Essential hypertension 3/6/2020 Yes     Type 2 diabetes mellitus with hyperglycemia ([facility] [address] 3/6/2020 Yes     Class 3 severe obesity with serious comorbidity and body mass index (BMI) of 45.0 to 49.9 in adult [facility]) 3/5/2020 Yes     Obstructive sleep apnea on CPAP 3/6/2020 Yes             Plan:         1. Switch IV antibiotics to oral  Keflex 1000 mg every 6 hours for 7 days as per ID   2. Pain control with Ultram as needed  3. Diabetes control with adjusted home dose of insulins  4. Keep left leg raised  5.  Discharge home , cleared by ID        Significant therapeutic interventions: IV antibiotics, pain control    Significant Diagnostic Studies:   Labs / Micro:  CBC:   Lab Results   Component Value Date    WBC 4.4 03/07/2020    RBC 4.00 03/07/2020    HGB 12.6 03/07/2020    HCT 37.8 03/07/2020    MCV 94.5 03/07/2020    MCH 31.5 03/07/2020    MCHC 33.3 03/07/2020    RDW 13.4 03/07/2020     03/07/2020     BMP:    Lab Results   Component Value Date    GLUCOSE 168 03/06/2020     03/06/2020    K 3.7 03/06/2020    CL 99 03/06/2020    CO2 20 03/06/2020    ANIONGAP 12 03/06/2020    BUN 17 03/06/2020    CREATININE 0.96 03/06/2020    BUNCRER 18 03/06/2020    CALCIUM 8.4 03/06/2020    LABGLOM >60 03/06/2020    GFRAA >60 03/06/2020    GFR      03/06/2020    GFR NOT REPORTED 03/06/2020     HFP:  No results found for: ALB, PROT  CMP:    Lab Results   Component Value Date    GLUCOSE 168 03/06/2020     03/06/2020    K 3.7 03/06/2020    CL 99 03/06/2020    CO2 20 03/06/2020    BUN 17 03/06/2020    CREATININE 0.96 03/06/2020    ANIONGAP 12 03/06/2020    LABGLOM >60 03/06/2020    GFRAA >60 03/06/2020    GFR      03/06/2020    GFR NOT REPORTED 03/06/2020    CALCIUM 8.4 03/06/2020     PT/INR:  No results found for: PTINR, PROTIME, INR  PTT: No results found for: APTT  FLP:  No results found for: CHOL, TRIG, HDL  U/A:  No results found for: COLORU, TURBIDITY, SPECGRAV, HGBUR, PHUR, PROTEINU, GLUCOSEU, KETUA, BILIRUBINUR, UROBILINOGEN, NITRU, LEUKOCYTESUR  TSH:  No results found for:

## 2020-03-07 NOTE — PROGRESS NOTES
[facility]    Progress Note    3/7/2020    9:02 AM    Name:   Francisco Schafer  MRN:     [id number]     Acct:      [de-identified]   Room:   [facility]8/[facility] Day:  2  Admit Date:  3/5/2020  5:22 PM    PCP:   Jairo Dhaliwal MD  Code Status:  Full Code    Subjective:     C/C: Cellulitis    Interval History Status:     pain/redness/swelling of left leg has significantly improved  Denies any fever/chills  Denies nausea vomiting  Brief History:   Francisco Schafer is a 48 y.o. Non-/non  male who presents with No chief complaint on file. and is admitted to the hospital for the management of Cellulitis.     [facility] with the complaint of cellulitis in the left leg. He has a history of having cellulitis in this leg, but has not had an issue in several years. He states he noticed the issue on Tuesday and it continued to worsen. He has three prior admissions for cellulitis of the left leg. Upon assessment, the left lower leg is erythematous, edematous, and has some areas of weeping. He denies much pain to the area. He is being admitted for management of cellulitis of the left leg. Review of Systems:     Constitutional:  negative for chills, fevers, sweats  Respiratory:  negative for cough, dyspnea on exertion, shortness of breath, wheezing  Cardiovascular:  negative for chest pain, chest pressure/discomfort, lower extremity edema, palpitations  Gastrointestinal:  negative for abdominal pain, constipation, diarrhea, nausea, vomiting  Neurological:  negative for dizziness, headache    Medications:      Allergies:  No Known Allergies    Current Meds:   Scheduled Meds:    ceFAZolin  2 g Intravenous Q8H    atorvastatin  40 mg Oral Nightly    insulin lispro prot & lispro  65 Units Subcutaneous TID     lisinopril  20 mg Oral Daily    sodium chloride flush  10 mL Intravenous 2 times per day    enoxaparin  40 mg

## 2020-03-07 NOTE — PLAN OF CARE
Problem: Falls - Risk of:  Goal: Will remain free from falls  Description: Will remain free from falls  Outcome: Ongoing  Goal: Absence of physical injury  Description: Absence of physical injury  Outcome: Ongoing     Problem: Pain:  Description: Pain management should include both nonpharmacologic and pharmacologic interventions. Goal: Pain level will decrease  Description: Pain level will decrease  Outcome: Ongoing  Goal: Control of acute pain  Description: Control of acute pain  Outcome: Ongoing  Goal: Control of chronic pain  Description: Control of chronic pain  Outcome: Ongoing     Problem: Discharge Planning:  Goal: Discharged to appropriate level of care  Description: Discharged to appropriate level of care  Outcome: Ongoing     Problem: Body Temperature - Imbalanced:  Goal: Ability to maintain a body temperature in the normal range will improve  Description: Ability to maintain a body temperature in the normal range will improve  Outcome: Ongoing     Problem: Mobility - Impaired:  Goal: Mobility will improve to maximum level  Description: Mobility will improve to maximum level  Outcome: Ongoing     Problem: Pain:  Description: Pain management should include both nonpharmacologic and pharmacologic interventions. Goal: Pain level will decrease  Description: Pain level will decrease  Outcome: Ongoing  Goal: Control of acute pain  Description: Control of acute pain  Outcome: Ongoing  Goal: Control of chronic pain  Description: Control of chronic pain  Outcome: Ongoing     Problem: Skin Integrity - Impaired:  Goal: Will show no infection signs and symptoms  Description: Will show no infection signs and symptoms  Outcome: Ongoing  Goal: Absence of new skin breakdown  Description: Absence of new skin breakdown  Outcome: Ongoing     Problem:  Activity:  Goal: Risk for activity intolerance will decrease  Description: Risk for activity intolerance will decrease  Outcome: Ongoing     Problem: Coping:  Goal: Ability to adjust to condition or change in health will improve  Description: Ability to adjust to condition or change in health will improve  Outcome: Ongoing     Problem: Fluid Volume:  Goal: Ability to maintain a balanced intake and output will improve  Description: Ability to maintain a balanced intake and output will improve  Outcome: Ongoing     Problem: Health Behavior:  Goal: Ability to identify and utilize available resources and services will improve  Description: Ability to identify and utilize available resources and services will improve  Outcome: Ongoing  Goal: Ability to manage health-related needs will improve  Description: Ability to manage health-related needs will improve  Outcome: Ongoing     Problem: Metabolic:  Goal: Ability to maintain appropriate glucose levels will improve  Description: Ability to maintain appropriate glucose levels will improve  Outcome: Ongoing     Problem: Nutritional:  Goal: Maintenance of adequate nutrition will improve  Description: Maintenance of adequate nutrition will improve  Outcome: Ongoing  Goal: Progress toward achieving an optimal weight will improve  Description: Progress toward achieving an optimal weight will improve  Outcome: Ongoing     Problem: Physical Regulation:  Goal: Complications related to the disease process, condition or treatment will be avoided or minimized  Description: Complications related to the disease process, condition or treatment will be avoided or minimized  Outcome: Ongoing  Goal: Diagnostic test results will improve  Description: Diagnostic test results will improve  Outcome: Ongoing     Problem: Skin Integrity:  Goal: Risk for impaired skin integrity will decrease  Description: Risk for impaired skin integrity will decrease  Outcome: Ongoing     Problem: Tissue Perfusion:  Goal: Adequacy of tissue perfusion will improve  Description: Adequacy of tissue perfusion will improve  Outcome: Ongoing

## 2020-03-07 NOTE — PROGRESS NOTES
The patient is discharged home at this time in stable condition; the patient's family is present to drive him home.

## 2020-03-11 LAB
CULTURE: NORMAL
CULTURE: NORMAL
Lab: NORMAL
Lab: NORMAL
SPECIMEN DESCRIPTION: NORMAL
SPECIMEN DESCRIPTION: NORMAL

## 2024-05-17 ENCOUNTER — HOSPITAL ENCOUNTER (OUTPATIENT)
Age: 59
Setting detail: SPECIMEN
Discharge: HOME | End: 2024-05-17

## 2024-05-17 LAB
25(OH)D3 SERPL-MCNC: 20 NG/ML (ref 30–100)
ALBUMIN SERPL-MCNC: 4 G/DL (ref 3.5–5.2)
ALBUMIN/GLOB SERPL: 1 {RATIO} (ref 1–2.5)
ALP SERPL-CCNC: 73 U/L (ref 40–129)
ALT SERPL-CCNC: 29 U/L (ref 10–50)
ANION GAP SERPL CALCULATED.3IONS-SCNC: 10 MMOL/L (ref 9–16)
AST SERPL-CCNC: 21 U/L (ref 10–50)
BASOPHILS # BLD: 0.03 K/UL (ref 0–0.2)
BASOPHILS NFR BLD: 1 % (ref 0–2)
BILIRUB SERPL-MCNC: 0.6 MG/DL (ref 0–1.2)
BUN SERPL-MCNC: 14 MG/DL (ref 6–20)
CALCIUM SERPL-MCNC: 9.9 MG/DL (ref 8.6–10.4)
CHLORIDE SERPL-SCNC: 101 MMOL/L (ref 98–107)
CHOLEST SERPL-MCNC: 100 MG/DL (ref 0–199)
CHOLESTEROL/HDL RATIO: 3
CO2 SERPL-SCNC: 27 MMOL/L (ref 20–31)
CREAT SERPL-MCNC: 0.9 MG/DL (ref 0.7–1.2)
CREAT UR-MCNC: 54.6 MG/DL (ref 39–259)
EOSINOPHIL # BLD: 0.22 K/UL (ref 0–0.44)
EOSINOPHILS RELATIVE PERCENT: 4 % (ref 1–4)
ERYTHROCYTE [DISTWIDTH] IN BLOOD BY AUTOMATED COUNT: 12.8 % (ref 11.8–14.4)
EST. AVERAGE GLUCOSE BLD GHB EST-MCNC: 263 MG/DL
GFR, ESTIMATED: >90 ML/MIN/1.73M2
GLUCOSE SERPL-MCNC: 206 MG/DL (ref 74–99)
HBA1C MFR BLD: 10.8 % (ref 4–6)
HCT VFR BLD AUTO: 42 % (ref 40.7–50.3)
HDLC SERPL-MCNC: 33 MG/DL
HGB BLD-MCNC: 14.4 G/DL (ref 13–17)
IMM GRANULOCYTES # BLD AUTO: <0.03 K/UL (ref 0–0.3)
IMM GRANULOCYTES NFR BLD: 0 %
IRON SATN MFR SERPL: 36 % (ref 20–55)
IRON SERPL-MCNC: 116 UG/DL (ref 61–157)
LDLC SERPL CALC-MCNC: 42 MG/DL (ref 0–100)
LYMPHOCYTES NFR BLD: 1.53 K/UL (ref 1.1–3.7)
LYMPHOCYTES RELATIVE PERCENT: 28 % (ref 24–43)
MCH RBC QN AUTO: 31.5 PG (ref 25.2–33.5)
MCHC RBC AUTO-ENTMCNC: 34.3 G/DL (ref 28.4–34.8)
MCV RBC AUTO: 91.9 FL (ref 82.6–102.9)
MICROALBUMIN UR-MCNC: 47 MG/L (ref 0–20)
MICROALBUMIN/CREAT UR-RTO: 86 MCG/MG CREAT (ref 0–17)
MONOCYTES NFR BLD: 0.38 K/UL (ref 0.1–1.2)
MONOCYTES NFR BLD: 7 % (ref 3–12)
NEUTROPHILS NFR BLD: 60 % (ref 36–65)
NEUTS SEG NFR BLD: 3.21 K/UL (ref 1.5–8.1)
NRBC BLD-RTO: 0 PER 100 WBC
PLATELET # BLD AUTO: 190 K/UL (ref 138–453)
PMV BLD AUTO: 9.2 FL (ref 8.1–13.5)
POTASSIUM SERPL-SCNC: 5.1 MMOL/L (ref 3.7–5.3)
PROT SERPL-MCNC: 6.9 G/DL (ref 6.6–8.7)
PSA SERPL-MCNC: 0.3 NG/ML (ref 0–4)
RBC # BLD AUTO: 4.57 M/UL (ref 4.21–5.77)
SODIUM SERPL-SCNC: 138 MMOL/L (ref 136–145)
T4 FREE SERPL-MCNC: 1.1 NG/DL (ref 0.92–1.68)
TIBC SERPL-MCNC: 324 UG/DL (ref 250–450)
TRIGL SERPL-MCNC: 128 MG/DL
TSH SERPL DL<=0.05 MIU/L-ACNC: 1.27 UIU/ML (ref 0.27–4.2)
UNSATURATED IRON BINDING CAPACITY: 208 UG/DL (ref 112–347)
VIT B12 SERPL-MCNC: 610 PG/ML (ref 232–1245)
VLDLC SERPL CALC-MCNC: 26 MG/DL
WBC OTHER # BLD: 5.4 K/UL (ref 3.5–11.3)

## 2025-04-10 ENCOUNTER — APPOINTMENT (OUTPATIENT)
Dept: MRI IMAGING | Age: 60
DRG: 095 | End: 2025-04-10
Payer: COMMERCIAL

## 2025-04-10 ENCOUNTER — HOSPITAL ENCOUNTER (INPATIENT)
Age: 60
LOS: 5 days | Discharge: HOME | DRG: 095 | End: 2025-04-15
Attending: EMERGENCY MEDICINE | Admitting: INTERNAL MEDICINE
Payer: COMMERCIAL

## 2025-04-10 DIAGNOSIS — R83.9 CSF ABNORMAL: ICD-10-CM

## 2025-04-10 DIAGNOSIS — R29.898 BILATERAL LEG WEAKNESS: Primary | ICD-10-CM

## 2025-04-10 DIAGNOSIS — N30.00 ACUTE CYSTITIS WITHOUT HEMATURIA: ICD-10-CM

## 2025-04-10 DIAGNOSIS — R29.6 FALLS: ICD-10-CM

## 2025-04-10 LAB
ALBUMIN SERPL-MCNC: 4.2 G/DL (ref 3.5–5.2)
ALP SERPL-CCNC: 81 U/L (ref 40–129)
ALT SERPL-CCNC: 33 U/L (ref 10–50)
ANION GAP SERPL CALCULATED.3IONS-SCNC: 12 MMOL/L (ref 9–16)
AST SERPL-CCNC: 23 U/L (ref 10–50)
BACTERIA URNS QL MICRO: ABNORMAL
BASOPHILS # BLD: 0 K/UL (ref 0–0.2)
BASOPHILS NFR BLD: 1 % (ref 0–2)
BILIRUB SERPL-MCNC: 0.5 MG/DL (ref 0–1.2)
BILIRUB UR QL STRIP: NEGATIVE
BUN SERPL-MCNC: 15 MG/DL (ref 6–20)
CALCIUM SERPL-MCNC: 9.7 MG/DL (ref 8.6–10.4)
CASTS #/AREA URNS LPF: ABNORMAL /LPF
CHLORIDE SERPL-SCNC: 104 MMOL/L (ref 98–107)
CK SERPL-CCNC: 299 U/L (ref 39–308)
CLARITY UR: ABNORMAL
CO2 SERPL-SCNC: 25 MMOL/L (ref 20–31)
COLOR UR: YELLOW
CREAT SERPL-MCNC: 0.9 MG/DL (ref 0.7–1.2)
EOSINOPHIL # BLD: 0.2 K/UL (ref 0–0.4)
EOSINOPHILS RELATIVE PERCENT: 3 % (ref 0–4)
EPI CELLS #/AREA URNS HPF: ABNORMAL /HPF
ERYTHROCYTE [DISTWIDTH] IN BLOOD BY AUTOMATED COUNT: 13.2 % (ref 11.5–14.9)
GFR, ESTIMATED: >90 ML/MIN/1.73M2
GLUCOSE BLD-MCNC: 226 MG/DL (ref 75–110)
GLUCOSE SERPL-MCNC: 244 MG/DL (ref 74–99)
GLUCOSE UR STRIP-MCNC: ABNORMAL MG/DL
HCT VFR BLD AUTO: 44.1 % (ref 41–53)
HGB BLD-MCNC: 15.4 G/DL (ref 13.5–17.5)
HGB UR QL STRIP.AUTO: ABNORMAL
KETONES UR STRIP-MCNC: NEGATIVE MG/DL
LEUKOCYTE ESTERASE UR QL STRIP: ABNORMAL
LYMPHOCYTES NFR BLD: 1.3 K/UL (ref 1–4.8)
LYMPHOCYTES RELATIVE PERCENT: 19 % (ref 24–44)
MAGNESIUM SERPL-MCNC: 2.2 MG/DL (ref 1.6–2.6)
MCH RBC QN AUTO: 32.3 PG (ref 26–34)
MCHC RBC AUTO-ENTMCNC: 35 G/DL (ref 31–37)
MCV RBC AUTO: 92.4 FL (ref 80–100)
MONOCYTES NFR BLD: 0.3 K/UL (ref 0.1–1.3)
MONOCYTES NFR BLD: 5 % (ref 1–7)
NEUTROPHILS NFR BLD: 72 % (ref 36–66)
NEUTS SEG NFR BLD: 4.8 K/UL (ref 1.3–9.1)
NITRITE UR QL STRIP: POSITIVE
PH UR STRIP: 6.5 [PH] (ref 5–8)
PLATELET # BLD AUTO: 221 K/UL (ref 150–450)
PMV BLD AUTO: 6.7 FL (ref 6–12)
POTASSIUM SERPL-SCNC: 4.1 MMOL/L (ref 3.7–5.3)
PROT SERPL-MCNC: 7.3 G/DL (ref 6.6–8.7)
PROT UR STRIP-MCNC: NEGATIVE MG/DL
RBC # BLD AUTO: 4.78 M/UL (ref 4.5–5.9)
RBC #/AREA URNS HPF: ABNORMAL /HPF
SODIUM SERPL-SCNC: 141 MMOL/L (ref 136–145)
SP GR UR STRIP: 1.03 (ref 1–1.03)
UROBILINOGEN UR STRIP-ACNC: NORMAL EU/DL (ref 0–1)
WBC #/AREA URNS HPF: ABNORMAL /HPF
WBC OTHER # BLD: 6.6 K/UL (ref 3.5–11)

## 2025-04-10 PROCEDURE — 81001 URINALYSIS AUTO W/SCOPE: CPT

## 2025-04-10 PROCEDURE — 85025 COMPLETE CBC W/AUTO DIFF WBC: CPT

## 2025-04-10 PROCEDURE — 72158 MRI LUMBAR SPINE W/O & W/DYE: CPT

## 2025-04-10 PROCEDURE — 99285 EMERGENCY DEPT VISIT HI MDM: CPT

## 2025-04-10 PROCEDURE — 86403 PARTICLE AGGLUT ANTBDY SCRN: CPT

## 2025-04-10 PROCEDURE — 87086 URINE CULTURE/COLONY COUNT: CPT

## 2025-04-10 PROCEDURE — 72157 MRI CHEST SPINE W/O & W/DYE: CPT

## 2025-04-10 PROCEDURE — 36415 COLL VENOUS BLD VENIPUNCTURE: CPT

## 2025-04-10 PROCEDURE — 87186 SC STD MICRODIL/AGAR DIL: CPT

## 2025-04-10 PROCEDURE — 72156 MRI NECK SPINE W/O & W/DYE: CPT

## 2025-04-10 PROCEDURE — 6360000002 HC RX W HCPCS

## 2025-04-10 PROCEDURE — 82947 ASSAY GLUCOSE BLOOD QUANT: CPT

## 2025-04-10 PROCEDURE — A9579 GAD-BASE MR CONTRAST NOS,1ML: HCPCS | Performed by: STUDENT IN AN ORGANIZED HEALTH CARE EDUCATION/TRAINING PROGRAM

## 2025-04-10 PROCEDURE — 80053 COMPREHEN METABOLIC PANEL: CPT

## 2025-04-10 PROCEDURE — 2500000003 HC RX 250 WO HCPCS: Performed by: STUDENT IN AN ORGANIZED HEALTH CARE EDUCATION/TRAINING PROGRAM

## 2025-04-10 PROCEDURE — 6370000000 HC RX 637 (ALT 250 FOR IP)

## 2025-04-10 PROCEDURE — 83735 ASSAY OF MAGNESIUM: CPT

## 2025-04-10 PROCEDURE — 6360000002 HC RX W HCPCS: Performed by: STUDENT IN AN ORGANIZED HEALTH CARE EDUCATION/TRAINING PROGRAM

## 2025-04-10 PROCEDURE — 6360000004 HC RX CONTRAST MEDICATION: Performed by: STUDENT IN AN ORGANIZED HEALTH CARE EDUCATION/TRAINING PROGRAM

## 2025-04-10 PROCEDURE — 1200000000 HC SEMI PRIVATE

## 2025-04-10 PROCEDURE — 82550 ASSAY OF CK (CPK): CPT

## 2025-04-10 RX ORDER — ENOXAPARIN SODIUM 100 MG/ML
30 INJECTION SUBCUTANEOUS 2 TIMES DAILY
Status: DISCONTINUED | OUTPATIENT
Start: 2025-04-10 | End: 2025-04-15 | Stop reason: HOSPADM

## 2025-04-10 RX ORDER — SODIUM CHLORIDE 0.9 % (FLUSH) 0.9 %
10 SYRINGE (ML) INJECTION PRN
Status: DISCONTINUED | OUTPATIENT
Start: 2025-04-10 | End: 2025-04-15 | Stop reason: HOSPADM

## 2025-04-10 RX ORDER — BISACODYL 10 MG
10 SUPPOSITORY, RECTAL RECTAL DAILY PRN
Status: DISCONTINUED | OUTPATIENT
Start: 2025-04-10 | End: 2025-04-15 | Stop reason: HOSPADM

## 2025-04-10 RX ORDER — ONDANSETRON 2 MG/ML
4 INJECTION INTRAMUSCULAR; INTRAVENOUS EVERY 6 HOURS PRN
Status: DISCONTINUED | OUTPATIENT
Start: 2025-04-10 | End: 2025-04-15 | Stop reason: HOSPADM

## 2025-04-10 RX ORDER — POTASSIUM CHLORIDE 1500 MG/1
40 TABLET, EXTENDED RELEASE ORAL PRN
Status: DISCONTINUED | OUTPATIENT
Start: 2025-04-10 | End: 2025-04-15 | Stop reason: HOSPADM

## 2025-04-10 RX ORDER — GABAPENTIN 400 MG/1
800 CAPSULE ORAL 3 TIMES DAILY
COMMUNITY

## 2025-04-10 RX ORDER — DULOXETIN HYDROCHLORIDE 60 MG/1
60 CAPSULE, DELAYED RELEASE ORAL DAILY
COMMUNITY

## 2025-04-10 RX ORDER — OXYBUTYNIN CHLORIDE 10 MG/1
10 TABLET, EXTENDED RELEASE ORAL DAILY
COMMUNITY

## 2025-04-10 RX ORDER — POLYETHYLENE GLYCOL 3350 17 G/17G
17 POWDER, FOR SOLUTION ORAL DAILY PRN
Status: DISCONTINUED | OUTPATIENT
Start: 2025-04-10 | End: 2025-04-15 | Stop reason: HOSPADM

## 2025-04-10 RX ORDER — DEXTROSE MONOHYDRATE 100 MG/ML
INJECTION, SOLUTION INTRAVENOUS CONTINUOUS PRN
Status: DISCONTINUED | OUTPATIENT
Start: 2025-04-10 | End: 2025-04-15 | Stop reason: HOSPADM

## 2025-04-10 RX ORDER — ACETAMINOPHEN 325 MG/1
650 TABLET ORAL EVERY 6 HOURS PRN
Status: DISCONTINUED | OUTPATIENT
Start: 2025-04-10 | End: 2025-04-15 | Stop reason: HOSPADM

## 2025-04-10 RX ORDER — MAGNESIUM SULFATE HEPTAHYDRATE 40 MG/ML
2000 INJECTION, SOLUTION INTRAVENOUS PRN
Status: DISCONTINUED | OUTPATIENT
Start: 2025-04-10 | End: 2025-04-15 | Stop reason: HOSPADM

## 2025-04-10 RX ORDER — ACETAMINOPHEN 650 MG/1
650 SUPPOSITORY RECTAL EVERY 6 HOURS PRN
Status: DISCONTINUED | OUTPATIENT
Start: 2025-04-10 | End: 2025-04-15 | Stop reason: HOSPADM

## 2025-04-10 RX ORDER — ATORVASTATIN CALCIUM 40 MG/1
40 TABLET, FILM COATED ORAL DAILY
Status: DISCONTINUED | OUTPATIENT
Start: 2025-04-11 | End: 2025-04-11

## 2025-04-10 RX ORDER — INSULIN LISPRO 100 [IU]/ML
0-8 INJECTION, SOLUTION INTRAVENOUS; SUBCUTANEOUS
Status: DISCONTINUED | OUTPATIENT
Start: 2025-04-10 | End: 2025-04-15 | Stop reason: HOSPADM

## 2025-04-10 RX ORDER — ONDANSETRON 4 MG/1
4 TABLET, ORALLY DISINTEGRATING ORAL EVERY 8 HOURS PRN
Status: DISCONTINUED | OUTPATIENT
Start: 2025-04-10 | End: 2025-04-15 | Stop reason: HOSPADM

## 2025-04-10 RX ORDER — DULOXETIN HYDROCHLORIDE 60 MG/1
60 CAPSULE, DELAYED RELEASE ORAL DAILY
Status: DISCONTINUED | OUTPATIENT
Start: 2025-04-11 | End: 2025-04-11

## 2025-04-10 RX ORDER — SODIUM CHLORIDE 9 MG/ML
INJECTION, SOLUTION INTRAVENOUS PRN
Status: DISCONTINUED | OUTPATIENT
Start: 2025-04-10 | End: 2025-04-15 | Stop reason: HOSPADM

## 2025-04-10 RX ORDER — TIRZEPATIDE 10 MG/.5ML
10 INJECTION, SOLUTION SUBCUTANEOUS WEEKLY
COMMUNITY

## 2025-04-10 RX ORDER — OXYBUTYNIN CHLORIDE 10 MG/1
10 TABLET, EXTENDED RELEASE ORAL DAILY
Status: DISCONTINUED | OUTPATIENT
Start: 2025-04-11 | End: 2025-04-11

## 2025-04-10 RX ORDER — POTASSIUM CHLORIDE 7.45 MG/ML
10 INJECTION INTRAVENOUS PRN
Status: DISCONTINUED | OUTPATIENT
Start: 2025-04-10 | End: 2025-04-15 | Stop reason: HOSPADM

## 2025-04-10 RX ORDER — GABAPENTIN 400 MG/1
800 CAPSULE ORAL 3 TIMES DAILY
Status: DISCONTINUED | OUTPATIENT
Start: 2025-04-10 | End: 2025-04-15 | Stop reason: HOSPADM

## 2025-04-10 RX ORDER — LISINOPRIL 20 MG/1
20 TABLET ORAL DAILY
Status: DISCONTINUED | OUTPATIENT
Start: 2025-04-11 | End: 2025-04-11

## 2025-04-10 RX ADMIN — WATER 1000 MG: 1 INJECTION INTRAMUSCULAR; INTRAVENOUS; SUBCUTANEOUS at 21:32

## 2025-04-10 RX ADMIN — INSULIN LISPRO 2 UNITS: 100 INJECTION, SOLUTION INTRAVENOUS; SUBCUTANEOUS at 22:47

## 2025-04-10 RX ADMIN — GADOTERIDOL 20 ML: 279.3 INJECTION, SOLUTION INTRAVENOUS at 20:57

## 2025-04-10 RX ADMIN — SODIUM CHLORIDE, PRESERVATIVE FREE 10 ML: 5 INJECTION INTRAVENOUS at 20:57

## 2025-04-10 RX ADMIN — ENOXAPARIN SODIUM 30 MG: 100 INJECTION SUBCUTANEOUS at 22:47

## 2025-04-10 ASSESSMENT — LIFESTYLE VARIABLES
HOW OFTEN DO YOU HAVE A DRINK CONTAINING ALCOHOL: NEVER
HOW MANY STANDARD DRINKS CONTAINING ALCOHOL DO YOU HAVE ON A TYPICAL DAY: PATIENT DOES NOT DRINK

## 2025-04-10 ASSESSMENT — PAIN SCALES - GENERAL
PAINLEVEL_OUTOF10: 0
PAINLEVEL_OUTOF10: 3

## 2025-04-10 NOTE — ED PROVIDER NOTES
EMERGENCY DEPARTMENT ENCOUNTER   ATTENDING ATTESTATION     Pt Name: Bandar Willard  MRN: [id number]  Birthdate 1965  Date of evaluation: 4/10/25       Bandar Willard is a 59 y.o. male who presents with Extremity Weakness (Bilat legs), Fall (X3 times), and Head Injury (From fall @ 1pm)      MDM:   Generalized leg weakness this week.  Bilateral legs thighs weak.  States he fell 3 times this week.  He bumped his head lightly today but no LOC.  Not on anticoagulants.  No neck pain.  No back pain.  He states he has never had any issues with weakness in his legs before.  He has been feeling some fatigue.  No fever chill sweats.    Metabolic workup negative.  Checking MRI of his CTL spine cannot explain his leg weakness.  Strength is 4 out of 5 with some mild sensation loss in his legs.  He is got a history of neuropathy states in his legs.  I do not see an infected diabetic foot ulcer.  Denies any paresthesias or urinary incontinence or retention.  He will likely need to be admitted for the leg weakness for PT OT evaluation.    Vitals:   Vitals:    04/10/25 1610 04/10/25 1614 04/10/25 1618   BP: 132/83     Pulse: 84     Resp: 20     Temp:   98.3 °F (36.8 °C)   SpO2: 98%     Weight:  108.9 kg (240 lb)    Height:  1.778 m (5' 10\")          I personally saw and examined the patient. I have reviewed and agree with the resident's findings, including all diagnostic interpretations and treatment plan as written. I was present for the key portions of any procedures performed and the inclusive time noted for any critical care statement.    Isauro Rios MD  Attending Emergency Physician            Isauro Rios MD  04/10/25 5121

## 2025-04-10 NOTE — ED PROVIDER NOTES
[facility] EMERGENCY DEPARTMENT  Emergency Department Encounter  Emergency Medicine Resident     Pt Name:Bandar Willard  MRN: [id number]  Birthdate 1965  Date of evaluation: 4/10/25  PCP:  Makenna Hernandez MD  Note Started: 4:22 PM EDT      CHIEF COMPLAINT       Chief Complaint   Patient presents with    Extremity Weakness     Bilat legs    Fall     X3 times    Head Injury     From fall @ 1pm       HISTORY OF PRESENT ILLNESS  (Location/Symptom, Timing/Onset, Context/Setting, Quality, Duration, Modifying Factors, Severity.)      Bandar Willard is a 59 y.o. male who presents with bilateral lower extremity weakness.  States symptoms started 1.5 weeks ago.  Denies any inciting events or traumas.  States he has had 3 falls since symptom onset.  States he mostly notices some weakness in his thighs compared to his usual, and then while ambulating his knees will buckle and give out.  States thigh weakness more pronounced after sitting on toilet and then standing up.  Reports history of diabetic neuropathy consisting of burning pain in the feet.  States he has very slightly decreased sensation in the bilateral lower extremities compared to what he usually feels.  States last fall was earlier today a few hours ago, states he did lightly hit his head, reports very minimal headache, denies LOC.  Not on any blood thinners.  Denies vision changes, chest pain, shortness of breath, cough, abdominal pain, nausea, vomiting, incontinence, saddle anesthesia, dysuria, hematuria, flank pain, back pain, neck pain, rash, dizziness, lightheadedness.  Denies recent illness.  Reports smoking cigars daily, denies significant alcohol use, denies drug use including IV drugs.  Denies history of similar symptoms in the past.  States about 3 or 4 months ago was changed off of Ozempic to Mounjaro by his primary care provider.  Last A1c 10.9% 11/14/2024.    PAST MEDICAL / SURGICAL / SOCIAL / FAMILY HISTORY      has a past medical history of

## 2025-04-10 NOTE — ED NOTES
Report given to LYUDMILA Almaguer from 9+.   Report method by phone.   The following was reviewed with receiving RN:   Current vital signs:  /83   Pulse 77   Temp 98.3 °F (36.8 °C)   Resp 13   Ht 1.778 m (5' 10\")   Wt 108.9 kg (240 lb)   SpO2 98%   BMI 34.44 kg/m²                      Any medication or safety alerts were reviewed. Any pending diagnostics and notifications were also reviewed, as well as any safety concerns or issues, abnormal labs, abnormal imaging, and abnormal assessment findings. Questions were answered.

## 2025-04-10 NOTE — ED NOTES
Mode of arrival (squad #, walk in, police, etc) : Walk-in        Chief complaint(s): Multiple falls, extremity weakness        Arrival Note (brief scenario, treatment PTA, etc).: Pt arrived to the ED via wheelchair for extremity weakness that has resulted in multiple falls. Pt states he can feels his legs about to give out on him. Pt states that he fell today in the parking lot at work and \"slightly hit his head\". Pt states that now he has a headache. Pt states that he does have neuropathy but this feels different.         C= \"Have you ever felt that you should Cut down on your drinking?\"  No  A= \"Have people Annoyed you by criticizing your drinking?\"  No  G= \"Have you ever felt bad or Guilty about your drinking?\"  No  E= \"Have you ever had a drink as an Eye-opener first thing in the morning to steady your nerves or to help a hangover?\"  No      Deferred []      Reason for deferring: N/A    *If yes to two or more: probable alcohol abuse.*

## 2025-04-11 ENCOUNTER — APPOINTMENT (OUTPATIENT)
Dept: CT IMAGING | Age: 60
DRG: 095 | End: 2025-04-11
Payer: COMMERCIAL

## 2025-04-11 ENCOUNTER — APPOINTMENT (OUTPATIENT)
Dept: INTERVENTIONAL RADIOLOGY/VASCULAR | Age: 60
DRG: 095 | End: 2025-04-11
Payer: COMMERCIAL

## 2025-04-11 PROBLEM — E11.40 DIABETIC NEUROPATHY (HCC): Status: ACTIVE | Noted: 2025-04-11

## 2025-04-11 PROBLEM — M48.02 SPINAL STENOSIS IN CERVICAL REGION: Status: ACTIVE | Noted: 2025-04-11

## 2025-04-11 PROBLEM — M48.061 SPINAL STENOSIS, LUMBAR REGION, WITHOUT NEUROGENIC CLAUDICATION: Status: ACTIVE | Noted: 2025-04-11

## 2025-04-11 PROBLEM — R29.6 FALLS: Status: ACTIVE | Noted: 2025-04-11

## 2025-04-11 LAB
ALBUMIN CSF-MCNC: 448 MG/L (ref 70–350)
ALBUMIN INDEX: 117.9
ALBUMIN SERPL-MCNC: 3.8 G/DL (ref 3.5–5.2)
ANION GAP SERPL CALCULATED.3IONS-SCNC: 11 MMOL/L (ref 9–16)
APPEARANCE CSF: CLEAR
BASOPHILS # BLD: 0.13 K/UL (ref 0–0.2)
BASOPHILS NFR BLD: 2 % (ref 0–2)
BUN SERPL-MCNC: 17 MG/DL (ref 6–20)
C GATTII+NEOFOR DNA CSF QL NAA+NON-PROBE: NOT DETECTED
CALCIUM SERPL-MCNC: 9.6 MG/DL (ref 8.6–10.4)
CASE NUMBER:: NORMAL
CHLORIDE SERPL-SCNC: 105 MMOL/L (ref 98–107)
CMV DNA CSF QL NAA+NON-PROBE: NOT DETECTED
CO2 SERPL-SCNC: 27 MMOL/L (ref 20–31)
COLOR CSF: COLORLESS
CREAT SERPL-MCNC: 1 MG/DL (ref 0.7–1.2)
E COLI K1 DNA CSF QL NAA+NON-PROBE: NOT DETECTED
EOSINOPHIL # BLD: 0.07 K/UL (ref 0–0.4)
EOSINOPHILS RELATIVE PERCENT: 1 % (ref 0–4)
ERYTHROCYTE [DISTWIDTH] IN BLOOD BY AUTOMATED COUNT: 13.1 % (ref 11.5–14.9)
EST. AVERAGE GLUCOSE BLD GHB EST-MCNC: 246 MG/DL
EV RNA CSF QL NAA+NON-PROBE: NOT DETECTED
FOLATE SERPL-MCNC: 17.7 NG/ML (ref 4.8–24.2)
GFR, ESTIMATED: 88 ML/MIN/1.73M2
GLUCOSE BLD-MCNC: 158 MG/DL (ref 75–110)
GLUCOSE BLD-MCNC: 202 MG/DL (ref 75–110)
GLUCOSE BLD-MCNC: 214 MG/DL (ref 75–110)
GLUCOSE BLD-MCNC: 247 MG/DL (ref 75–110)
GLUCOSE CSF-MCNC: 115 MG/DL (ref 40–70)
GLUCOSE SERPL-MCNC: 216 MG/DL (ref 74–99)
GP B STREP DNA CSF QL NAA+NON-PROBE: NOT DETECTED
HAEM INFLU DNA CSF QL NAA+NON-PROBE: NOT DETECTED
HBA1C MFR BLD: 10.2 % (ref 4–6)
HCT VFR BLD AUTO: 42 % (ref 41–53)
HGB BLD-MCNC: 14.8 G/DL (ref 13.5–17.5)
HHV6 DNA CSF QL NAA+NON-PROBE: NOT DETECTED
HSV1 DNA CSF QL NAA+NON-PROBE: NOT DETECTED
HSV2 DNA CSF QL NAA+NON-PROBE: NOT DETECTED
IGA SERPL-MCNC: 427 MG/DL (ref 70–400)
IGG CSF-MCNC: 5 MG/DL (ref 1–3)
IGG INDEX CSF: 0.6
IGG SERPL-MCNC: 705 MG/DL (ref 700–1600)
IGG SYNTHESIS RATE CSF: 4.2 MG/24 H
INR PPP: 1
L MONOCYTOG DNA CSF QL NAA+NON-PROBE: NOT DETECTED
LYMPHOCYTES NFR BLD: 0.79 K/UL (ref 1–4.8)
LYMPHOCYTES RELATIVE PERCENT: 12 % (ref 24–44)
MCH RBC QN AUTO: 32.6 PG (ref 26–34)
MCHC RBC AUTO-ENTMCNC: 35.2 G/DL (ref 31–37)
MCV RBC AUTO: 92.5 FL (ref 80–100)
MONOCYTES NFR BLD: 0.66 K/UL (ref 0.1–1.3)
MONOCYTES NFR BLD: 10 % (ref 1–7)
MORPHOLOGY: NORMAL
N MEN DNA CSF QL NAA+NON-PROBE: NOT DETECTED
NEUTROPHILS NFR BLD: 75 % (ref 36–66)
NEUTS SEG NFR BLD: 4.95 K/UL (ref 1.3–9.1)
NUC CELL # FLD MANUAL: 3 CELLS/UL
OLIGOCLONAL BANDS: ABNORMAL
PARECHOVIRUS A RNA CSF QL NAA+NON-PROBE: NOT DETECTED
PLATELET # BLD AUTO: 184 K/UL (ref 150–450)
PMV BLD AUTO: 6.8 FL (ref 6–12)
POTASSIUM SERPL-SCNC: 4.5 MMOL/L (ref 3.7–5.3)
PROT CSF-MCNC: 75 MG/DL (ref 15–45)
PROTHROMBIN TIME: 13.6 SEC (ref 11.8–14.6)
RBC # BLD AUTO: 4.54 M/UL (ref 4.5–5.9)
RBC # FLD MANUAL: 198 CELLS/UL
S PNEUM DNA CSF QL NAA+NON-PROBE: NOT DETECTED
SODIUM SERPL-SCNC: 143 MMOL/L (ref 136–145)
SPECIMEN DESCRIPTION: NORMAL
SPECIMEN DESCRIPTION: NORMAL
SPECIMEN VOL CSF: ABNORMAL ML
SURGICAL PATHOLOGY REPORT: NORMAL
TSH SERPL DL<=0.05 MIU/L-ACNC: 0.95 UIU/ML (ref 0.27–4.2)
TUBE # CSF: 3
VDRL CSF QL: NONREACTIVE
VIT B12 SERPL-MCNC: 452 PG/ML (ref 232–1245)
VZV DNA CSF QL NAA+NON-PROBE: NOT DETECTED
WBC OTHER # BLD: 6.6 K/UL (ref 3.5–11)
XANTHOCHROMIA CSF QL: ABNORMAL

## 2025-04-11 PROCEDURE — 82040 ASSAY OF SERUM ALBUMIN: CPT

## 2025-04-11 PROCEDURE — 84157 ASSAY OF PROTEIN OTHER: CPT

## 2025-04-11 PROCEDURE — 85025 COMPLETE CBC W/AUTO DIFF WBC: CPT

## 2025-04-11 PROCEDURE — 71250 CT THORAX DX C-: CPT

## 2025-04-11 PROCEDURE — 82947 ASSAY GLUCOSE BLOOD QUANT: CPT

## 2025-04-11 PROCEDURE — 80048 BASIC METABOLIC PNL TOTAL CA: CPT

## 2025-04-11 PROCEDURE — 82607 VITAMIN B-12: CPT

## 2025-04-11 PROCEDURE — 86592 SYPHILIS TEST NON-TREP QUAL: CPT

## 2025-04-11 PROCEDURE — 85610 PROTHROMBIN TIME: CPT

## 2025-04-11 PROCEDURE — 97166 OT EVAL MOD COMPLEX 45 MIN: CPT

## 2025-04-11 PROCEDURE — 009U3ZX DRAINAGE OF SPINAL CANAL, PERCUTANEOUS APPROACH, DIAGNOSTIC: ICD-10-PCS | Performed by: PSYCHIATRY & NEUROLOGY

## 2025-04-11 PROCEDURE — 99222 1ST HOSP IP/OBS MODERATE 55: CPT | Performed by: ORTHOPAEDIC SURGERY

## 2025-04-11 PROCEDURE — 83036 HEMOGLOBIN GLYCOSYLATED A1C: CPT

## 2025-04-11 PROCEDURE — 83916 OLIGOCLONAL BANDS: CPT

## 2025-04-11 PROCEDURE — 6370000000 HC RX 637 (ALT 250 FOR IP)

## 2025-04-11 PROCEDURE — 62328 DX LMBR SPI PNXR W/FLUOR/CT: CPT

## 2025-04-11 PROCEDURE — 86789 WEST NILE VIRUS ANTIBODY: CPT

## 2025-04-11 PROCEDURE — 84443 ASSAY THYROID STIM HORMONE: CPT

## 2025-04-11 PROCEDURE — 87483 CNS DNA AMP PROBE TYPE 12-25: CPT

## 2025-04-11 PROCEDURE — 6360000002 HC RX W HCPCS: Performed by: PSYCHIATRY & NEUROLOGY

## 2025-04-11 PROCEDURE — 99223 1ST HOSP IP/OBS HIGH 75: CPT | Performed by: PSYCHIATRY & NEUROLOGY

## 2025-04-11 PROCEDURE — 88108 CYTOPATH CONCENTRATE TECH: CPT

## 2025-04-11 PROCEDURE — 86788 WEST NILE VIRUS AB IGM: CPT

## 2025-04-11 PROCEDURE — 2060000000 HC ICU INTERMEDIATE R&B

## 2025-04-11 PROCEDURE — 36415 COLL VENOUS BLD VENIPUNCTURE: CPT

## 2025-04-11 PROCEDURE — 82784 ASSAY IGA/IGD/IGG/IGM EACH: CPT

## 2025-04-11 PROCEDURE — 97162 PT EVAL MOD COMPLEX 30 MIN: CPT

## 2025-04-11 PROCEDURE — 86618 LYME DISEASE ANTIBODY: CPT

## 2025-04-11 PROCEDURE — 89050 BODY FLUID CELL COUNT: CPT

## 2025-04-11 PROCEDURE — 70450 CT HEAD/BRAIN W/O DYE: CPT

## 2025-04-11 PROCEDURE — 2709999900 IR LUMBAR PUNCTURE FOR DIAGNOSIS

## 2025-04-11 PROCEDURE — 82042 OTHER SOURCE ALBUMIN QUAN EA: CPT

## 2025-04-11 PROCEDURE — 6360000002 HC RX W HCPCS

## 2025-04-11 PROCEDURE — 82746 ASSAY OF FOLIC ACID SERUM: CPT

## 2025-04-11 PROCEDURE — 82945 GLUCOSE OTHER FLUID: CPT

## 2025-04-11 PROCEDURE — 2500000003 HC RX 250 WO HCPCS

## 2025-04-11 PROCEDURE — 99223 1ST HOSP IP/OBS HIGH 75: CPT | Performed by: INTERNAL MEDICINE

## 2025-04-11 RX ORDER — DULOXETIN HYDROCHLORIDE 60 MG/1
60 CAPSULE, DELAYED RELEASE ORAL NIGHTLY
Status: DISCONTINUED | OUTPATIENT
Start: 2025-04-11 | End: 2025-04-15 | Stop reason: HOSPADM

## 2025-04-11 RX ORDER — ATORVASTATIN CALCIUM 40 MG/1
40 TABLET, FILM COATED ORAL NIGHTLY
Status: DISCONTINUED | OUTPATIENT
Start: 2025-04-12 | End: 2025-04-15 | Stop reason: HOSPADM

## 2025-04-11 RX ORDER — OXYBUTYNIN CHLORIDE 10 MG/1
10 TABLET, EXTENDED RELEASE ORAL NIGHTLY
Status: DISCONTINUED | OUTPATIENT
Start: 2025-04-12 | End: 2025-04-15 | Stop reason: HOSPADM

## 2025-04-11 RX ORDER — LISINOPRIL 20 MG/1
20 TABLET ORAL NIGHTLY
Status: DISCONTINUED | OUTPATIENT
Start: 2025-04-11 | End: 2025-04-15 | Stop reason: HOSPADM

## 2025-04-11 RX ADMIN — GABAPENTIN 800 MG: 400 CAPSULE ORAL at 08:11

## 2025-04-11 RX ADMIN — INSULIN LISPRO 2 UNITS: 100 INJECTION, SOLUTION INTRAVENOUS; SUBCUTANEOUS at 21:08

## 2025-04-11 RX ADMIN — SODIUM CHLORIDE, PRESERVATIVE FREE 10 ML: 5 INJECTION INTRAVENOUS at 22:19

## 2025-04-11 RX ADMIN — INSULIN LISPRO 2 UNITS: 100 INJECTION, SOLUTION INTRAVENOUS; SUBCUTANEOUS at 07:06

## 2025-04-11 RX ADMIN — ACETAMINOPHEN 650 MG: 325 TABLET, FILM COATED ORAL at 16:21

## 2025-04-11 RX ADMIN — GABAPENTIN 800 MG: 400 CAPSULE ORAL at 14:24

## 2025-04-11 RX ADMIN — IMMUNE GLOBULIN (HUMAN) 30 G: 10 INJECTION INTRAVENOUS; SUBCUTANEOUS at 17:32

## 2025-04-11 RX ADMIN — WATER 1000 MG: 1 INJECTION INTRAMUSCULAR; INTRAVENOUS; SUBCUTANEOUS at 22:16

## 2025-04-11 RX ADMIN — INSULIN LISPRO 2 UNITS: 100 INJECTION, SOLUTION INTRAVENOUS; SUBCUTANEOUS at 16:37

## 2025-04-11 ASSESSMENT — PAIN SCALES - GENERAL: PAINLEVEL_OUTOF10: 7

## 2025-04-11 ASSESSMENT — PAIN DESCRIPTION - LOCATION: LOCATION: LEG

## 2025-04-11 ASSESSMENT — PAIN DESCRIPTION - ORIENTATION: ORIENTATION: LEFT;RIGHT

## 2025-04-11 NOTE — H&P
Turbidity UA Cloudy (A) Clear    Glucose, Ur MOD (A) NEGATIVE mg/dL    Bilirubin, Urine NEGATIVE NEGATIVE    Ketones, Urine NEGATIVE NEGATIVE mg/dL    Specific Gravity, UA 1.034 (H) 1.000 - 1.030    Urine Hgb TRACE (A) NEGATIVE    pH, Urine 6.5 5.0 - 8.0    Protein, UA NEGATIVE NEGATIVE mg/dL    Urobilinogen, Urine Normal 0.0 - 1.0 EU/dL    Nitrite, Urine POSITIVE (A) NEGATIVE    Leukocyte Esterase, Urine MOD (A) NEGATIVE   Microscopic Urinalysis    Collection Time: 04/10/25  6:24 PM   Result Value Ref Range    WBC, UA TOO NUMEROUS TO COUNT (A) 0 TO 5 /HPF    RBC, UA 0 TO 2 0 TO 2 /HPF    Casts UA 0 TO 2 (A) None /LPF    Epithelial Cells, UA 0 TO 2 /HPF    Bacteria, UA MANY (A) None   POC Glucose Fingerstick    Collection Time: 04/10/25 10:29 PM   Result Value Ref Range    POC Glucose 226 (H) 75 - 110 mg/dL   Basic Metabolic Panel w/ Reflex to MG    Collection Time: 04/11/25  5:47 AM   Result Value Ref Range    Sodium 143 136 - 145 mmol/L    Potassium 4.5 3.7 - 5.3 mmol/L    Chloride 105 98 - 107 mmol/L    CO2 27 20 - 31 mmol/L    Anion Gap 11 9 - 16 mmol/L    Glucose 216 (H) 74 - 99 mg/dL    BUN 17 6 - 20 mg/dL    Creatinine 1.0 0.7 - 1.2 mg/dL    Est, Glom Filt Rate 88 >60 mL/min/1.73m2    Calcium 9.6 8.6 - 10.4 mg/dL   CBC with auto differential    Collection Time: 04/11/25  5:47 AM   Result Value Ref Range    WBC 6.6 3.5 - 11.0 k/uL    RBC 4.54 4.5 - 5.9 m/uL    Hemoglobin 14.8 13.5 - 17.5 g/dL    Hematocrit 42.0 41 - 53 %    MCV 92.5 80 - 100 fL    MCH 32.6 26 - 34 pg    MCHC 35.2 31 - 37 g/dL    RDW 13.1 11.5 - 14.9 %    Platelets 184 150 - 450 k/uL    MPV 6.8 6.0 - 12.0 fL    Neutrophils % 75 (H) 36 - 66 %    Lymphocytes % 12 (L) 24 - 44 %    Monocytes % 10 (H) 1 - 7 %    Eosinophils % 1 0 - 4 %    Basophils % 2 0 - 2 %    Neutrophils Absolute 4.95 1.3 - 9.1 k/uL    Lymphocytes Absolute 0.79 (L) 1.0 - 4.8 k/uL    Monocytes Absolute 0.66 0.1 - 1.3 k/uL    Eosinophils Absolute 0.07 0.0 - 0.4 k/uL    Basophils

## 2025-04-11 NOTE — CARE COORDINATION
DISCHARGE PLANNING NOTE:    This writer went to bedside with FOC lists for VNS and SNF at PT/OT recommendations. At this time patient has declined both services.                       Post Acute Facility/Agency List     Provided patient with the following list, the list includes the overall star ratings obtained from CMS per the Medicare Web site (www.Medicare.gov):     [] Long Term Acute Care Facilities  [] Acute Inpatient Rehabilitation Facilities  [x] Skilled Nursing Facilities  [] Hospice Facilities  [x] Home Care    Provided verbal instructions on how to utilize the QR Code to obtain additional detailed star ratings from www.Medicare.gov     offered to print and provide the detailed list:    []Accepted   [x]Declined    The following printed detailed lists were provided:     Home Care  Skilled Nursing Facilities.    Patient stated that he would be interested in outpatient therapy.    Electronically signed by Niyah Martins RN on 4/11/2025 at 1:48 PM

## 2025-04-11 NOTE — CARE COORDINATION
Case Management Assessment  Initial Evaluation    Date/Time of Evaluation: 4/11/2025 10:25 AM  Assessment Completed by: Niyah Martins RN    If patient is discharged prior to next notation, then this note serves as note for discharge by case management.    Patient Name: Bandar Willard                   YOB: 1965  Diagnosis: Falls [R29.6]  Bilateral leg weakness [R29.898]  Acute cystitis without hematuria [N30.00]  Weakness of both lower extremities [R29.898]                   Date / Time: 4/10/2025  4:06 PM    Patient Admission Status: Inpatient   Readmission Risk (Low < 19, Mod (19-27), High > 27): Readmission Risk Score: 5.4    Current PCP: Makenna Hernandez MD  PCP verified by CM? Yes (Dulce Aguilar NP)    Chart Reviewed: Yes      History Provided by: Patient  Patient Orientation: Alert and Oriented    Patient Cognition: Alert    Hospitalization in the last 30 days (Readmission):  No    If yes, Readmission Assessment in  Navigator will be completed.    Advance Directives:      Code Status: Full Code   Patient's Primary Decision Maker is: Legal Next of Kin      Discharge Planning:    Patient lives with: Spouse/Significant Other, Children Type of Home: House  Primary Care Giver: Self  Patient Support Systems include: Spouse/Significant Other, Children, Family Members   Current Financial resources: Other (Comment) (Commercial)  Current community resources: None  Current services prior to admission: C-pap, Durable Medical Equipment            Current DME: Cpap            Type of Home Care services:  PT, OT    ADLS  Prior functional level: Independent in ADLs/IADLs  Current functional level: Independent in ADLs/IADLs    PT AM-PAC: 18 /24  OT AM-PAC: 18 /24    Family can provide assistance at DC: Yes  Would you like Case Management to discuss the discharge plan with any other family members/significant others, and if so, who? Yes (Spouse; Vickie)  Plans to Return to Present Housing: Yes  Other Identified

## 2025-04-12 PROBLEM — R83.9 CSF ABNORMAL: Status: ACTIVE | Noted: 2025-04-12

## 2025-04-12 LAB
ANION GAP SERPL CALCULATED.3IONS-SCNC: 11 MMOL/L (ref 9–16)
BASOPHILS # BLD: 0 K/UL (ref 0–0.2)
BASOPHILS NFR BLD: 1 % (ref 0–2)
BUN SERPL-MCNC: 19 MG/DL (ref 6–20)
CALCIUM SERPL-MCNC: 9.2 MG/DL (ref 8.6–10.4)
CHLORIDE SERPL-SCNC: 106 MMOL/L (ref 98–107)
CO2 SERPL-SCNC: 22 MMOL/L (ref 20–31)
CREAT SERPL-MCNC: 0.8 MG/DL (ref 0.7–1.2)
EOSINOPHIL # BLD: 0.2 K/UL (ref 0–0.4)
EOSINOPHILS RELATIVE PERCENT: 4 % (ref 0–4)
ERYTHROCYTE [DISTWIDTH] IN BLOOD BY AUTOMATED COUNT: 13.1 % (ref 11.5–14.9)
GFR, ESTIMATED: >90 ML/MIN/1.73M2
GLUCOSE BLD-MCNC: 152 MG/DL (ref 75–110)
GLUCOSE BLD-MCNC: 182 MG/DL (ref 75–110)
GLUCOSE BLD-MCNC: 186 MG/DL (ref 75–110)
GLUCOSE BLD-MCNC: 314 MG/DL (ref 75–110)
GLUCOSE SERPL-MCNC: 156 MG/DL (ref 74–99)
HCT VFR BLD AUTO: 42.4 % (ref 41–53)
HGB BLD-MCNC: 14.9 G/DL (ref 13.5–17.5)
LYMPHOCYTES NFR BLD: 1 K/UL (ref 1–4.8)
LYMPHOCYTES RELATIVE PERCENT: 21 % (ref 24–44)
MCH RBC QN AUTO: 32.8 PG (ref 26–34)
MCHC RBC AUTO-ENTMCNC: 35.1 G/DL (ref 31–37)
MCV RBC AUTO: 93.3 FL (ref 80–100)
MICROORGANISM SPEC CULT: ABNORMAL
MONOCYTES NFR BLD: 0.3 K/UL (ref 0.1–1.3)
MONOCYTES NFR BLD: 7 % (ref 1–7)
NEUTROPHILS NFR BLD: 67 % (ref 36–66)
NEUTS SEG NFR BLD: 3 K/UL (ref 1.3–9.1)
PLATELET # BLD AUTO: 154 K/UL (ref 150–450)
PMV BLD AUTO: 6.9 FL (ref 6–12)
POTASSIUM SERPL-SCNC: 4.1 MMOL/L (ref 3.7–5.3)
RBC # BLD AUTO: 4.55 M/UL (ref 4.5–5.9)
SODIUM SERPL-SCNC: 139 MMOL/L (ref 136–145)
SPECIMEN DESCRIPTION: ABNORMAL
WBC OTHER # BLD: 4.6 K/UL (ref 3.5–11)

## 2025-04-12 PROCEDURE — 6370000000 HC RX 637 (ALT 250 FOR IP): Performed by: INTERNAL MEDICINE

## 2025-04-12 PROCEDURE — 6360000002 HC RX W HCPCS

## 2025-04-12 PROCEDURE — 82947 ASSAY GLUCOSE BLOOD QUANT: CPT

## 2025-04-12 PROCEDURE — 6360000002 HC RX W HCPCS: Performed by: PSYCHIATRY & NEUROLOGY

## 2025-04-12 PROCEDURE — 85025 COMPLETE CBC W/AUTO DIFF WBC: CPT

## 2025-04-12 PROCEDURE — 36415 COLL VENOUS BLD VENIPUNCTURE: CPT

## 2025-04-12 PROCEDURE — 99233 SBSQ HOSP IP/OBS HIGH 50: CPT | Performed by: INTERNAL MEDICINE

## 2025-04-12 PROCEDURE — 97110 THERAPEUTIC EXERCISES: CPT

## 2025-04-12 PROCEDURE — 99231 SBSQ HOSP IP/OBS SF/LOW 25: CPT | Performed by: ORTHOPAEDIC SURGERY

## 2025-04-12 PROCEDURE — 97116 GAIT TRAINING THERAPY: CPT

## 2025-04-12 PROCEDURE — 2060000000 HC ICU INTERMEDIATE R&B

## 2025-04-12 PROCEDURE — 97530 THERAPEUTIC ACTIVITIES: CPT

## 2025-04-12 PROCEDURE — 80048 BASIC METABOLIC PNL TOTAL CA: CPT

## 2025-04-12 PROCEDURE — 99232 SBSQ HOSP IP/OBS MODERATE 35: CPT | Performed by: PSYCHIATRY & NEUROLOGY

## 2025-04-12 PROCEDURE — 6370000000 HC RX 637 (ALT 250 FOR IP)

## 2025-04-12 PROCEDURE — 2500000003 HC RX 250 WO HCPCS

## 2025-04-12 RX ORDER — CALCIUM CARBONATE 500 MG/1
500 TABLET, CHEWABLE ORAL 3 TIMES DAILY PRN
Status: DISCONTINUED | OUTPATIENT
Start: 2025-04-12 | End: 2025-04-15 | Stop reason: HOSPADM

## 2025-04-12 RX ADMIN — LISINOPRIL 20 MG: 20 TABLET ORAL at 21:07

## 2025-04-12 RX ADMIN — INSULIN LISPRO 2 UNITS: 100 INJECTION, SOLUTION INTRAVENOUS; SUBCUTANEOUS at 11:59

## 2025-04-12 RX ADMIN — GABAPENTIN 800 MG: 400 CAPSULE ORAL at 14:52

## 2025-04-12 RX ADMIN — INSULIN LISPRO 2 UNITS: 100 INJECTION, SOLUTION INTRAVENOUS; SUBCUTANEOUS at 07:50

## 2025-04-12 RX ADMIN — WATER 1000 MG: 1 INJECTION INTRAMUSCULAR; INTRAVENOUS; SUBCUTANEOUS at 22:20

## 2025-04-12 RX ADMIN — GABAPENTIN 800 MG: 400 CAPSULE ORAL at 07:50

## 2025-04-12 RX ADMIN — ATORVASTATIN CALCIUM 40 MG: 40 TABLET, FILM COATED ORAL at 21:07

## 2025-04-12 RX ADMIN — DULOXETINE HYDROCHLORIDE 60 MG: 60 CAPSULE, DELAYED RELEASE ORAL at 21:07

## 2025-04-12 RX ADMIN — GABAPENTIN 800 MG: 400 CAPSULE ORAL at 21:07

## 2025-04-12 RX ADMIN — ACETAMINOPHEN 650 MG: 325 TABLET, FILM COATED ORAL at 00:26

## 2025-04-12 RX ADMIN — ACETAMINOPHEN 650 MG: 325 TABLET, FILM COATED ORAL at 14:51

## 2025-04-12 RX ADMIN — INSULIN LISPRO 6 UNITS: 100 INJECTION, SOLUTION INTRAVENOUS; SUBCUTANEOUS at 16:56

## 2025-04-12 RX ADMIN — IMMUNE GLOBULIN (HUMAN) 30 G: 10 INJECTION INTRAVENOUS; SUBCUTANEOUS at 17:03

## 2025-04-12 RX ADMIN — OXYBUTYNIN CHLORIDE 10 MG: 10 TABLET, EXTENDED RELEASE ORAL at 21:07

## 2025-04-12 RX ADMIN — ANTACID TABLETS 500 MG: 500 TABLET, CHEWABLE ORAL at 17:32

## 2025-04-12 ASSESSMENT — PAIN SCALES - GENERAL
PAINLEVEL_OUTOF10: 5
PAINLEVEL_OUTOF10: 5
PAINLEVEL_OUTOF10: 6
PAINLEVEL_OUTOF10: 6

## 2025-04-12 ASSESSMENT — PAIN DESCRIPTION - ORIENTATION
ORIENTATION: LEFT;RIGHT
ORIENTATION: LEFT;RIGHT
ORIENTATION: RIGHT;LEFT;POSTERIOR

## 2025-04-12 ASSESSMENT — PAIN DESCRIPTION - LOCATION
LOCATION: LEG

## 2025-04-12 ASSESSMENT — PAIN DESCRIPTION - PAIN TYPE: TYPE: CHRONIC PAIN

## 2025-04-12 ASSESSMENT — PAIN DESCRIPTION - DESCRIPTORS
DESCRIPTORS: CRAMPING
DESCRIPTORS: NUMBNESS
DESCRIPTORS: BURNING;NUMBNESS;TINGLING

## 2025-04-12 ASSESSMENT — PAIN DESCRIPTION - FREQUENCY: FREQUENCY: CONTINUOUS

## 2025-04-12 ASSESSMENT — PAIN DESCRIPTION - ONSET: ONSET: ON-GOING

## 2025-04-12 NOTE — CARE COORDINATION
ONGOING DISCHARGE PLAN:    Patient is alert and oriented x4.    Spoke with patient regarding discharge plan and patient confirms that plan is still to return to home w/ Spouse.     PT/OT on board. Rec Home w/ HH VS SNF. Pt. Is declining.     Previous DC Planner offered FOC list & Pt. Declined as well.     Pt. Remains on IV Rocephin.     Neuro/Ortho on board, Following for [facility].     Pt. Had LP yesterday w/ 9.5 ML Fluid removed.     Remains on IVIG.     Will continue to follow for additional discharge needs.    If patient is discharged prior to next notation, then this note serves as note for discharge by case management.    Electronically signed by Matilde Xiao RN on 4/12/2025 at 12:07 PM

## 2025-04-13 LAB
ANION GAP SERPL CALCULATED.3IONS-SCNC: 10 MMOL/L (ref 9–16)
BASOPHILS # BLD: 0 K/UL (ref 0–0.2)
BASOPHILS NFR BLD: 1 % (ref 0–2)
BUN SERPL-MCNC: 22 MG/DL (ref 6–20)
CALCIUM SERPL-MCNC: 8.8 MG/DL (ref 8.6–10.4)
CHLORIDE SERPL-SCNC: 107 MMOL/L (ref 98–107)
CO2 SERPL-SCNC: 22 MMOL/L (ref 20–31)
CREAT SERPL-MCNC: 0.9 MG/DL (ref 0.7–1.2)
EOSINOPHIL # BLD: 0.2 K/UL (ref 0–0.4)
EOSINOPHILS RELATIVE PERCENT: 5 % (ref 0–4)
ERYTHROCYTE [DISTWIDTH] IN BLOOD BY AUTOMATED COUNT: 13.1 % (ref 11.5–14.9)
GFR, ESTIMATED: >90 ML/MIN/1.73M2
GLUCOSE BLD-MCNC: 173 MG/DL (ref 75–110)
GLUCOSE BLD-MCNC: 177 MG/DL (ref 75–110)
GLUCOSE BLD-MCNC: 207 MG/DL (ref 75–110)
GLUCOSE BLD-MCNC: 211 MG/DL (ref 75–110)
GLUCOSE SERPL-MCNC: 182 MG/DL (ref 74–99)
HCT VFR BLD AUTO: 41.5 % (ref 41–53)
HGB BLD-MCNC: 14.4 G/DL (ref 13.5–17.5)
LYMPHOCYTES NFR BLD: 0.8 K/UL (ref 1–4.8)
LYMPHOCYTES RELATIVE PERCENT: 19 % (ref 24–44)
MCH RBC QN AUTO: 32.9 PG (ref 26–34)
MCHC RBC AUTO-ENTMCNC: 34.7 G/DL (ref 31–37)
MCV RBC AUTO: 94.7 FL (ref 80–100)
MONOCYTES NFR BLD: 0.3 K/UL (ref 0.1–1.3)
MONOCYTES NFR BLD: 7 % (ref 1–7)
NEUTROPHILS NFR BLD: 68 % (ref 36–66)
NEUTS SEG NFR BLD: 3.1 K/UL (ref 1.3–9.1)
PLATELET # BLD AUTO: 161 K/UL (ref 150–450)
PMV BLD AUTO: 6.7 FL (ref 6–12)
POTASSIUM SERPL-SCNC: 4 MMOL/L (ref 3.7–5.3)
RBC # BLD AUTO: 4.38 M/UL (ref 4.5–5.9)
SODIUM SERPL-SCNC: 139 MMOL/L (ref 136–145)
WBC OTHER # BLD: 4.5 K/UL (ref 3.5–11)

## 2025-04-13 PROCEDURE — 6370000000 HC RX 637 (ALT 250 FOR IP)

## 2025-04-13 PROCEDURE — 99232 SBSQ HOSP IP/OBS MODERATE 35: CPT | Performed by: PSYCHIATRY & NEUROLOGY

## 2025-04-13 PROCEDURE — 2500000003 HC RX 250 WO HCPCS: Performed by: STUDENT IN AN ORGANIZED HEALTH CARE EDUCATION/TRAINING PROGRAM

## 2025-04-13 PROCEDURE — 6370000000 HC RX 637 (ALT 250 FOR IP): Performed by: INTERNAL MEDICINE

## 2025-04-13 PROCEDURE — 2500000003 HC RX 250 WO HCPCS

## 2025-04-13 PROCEDURE — 82947 ASSAY GLUCOSE BLOOD QUANT: CPT

## 2025-04-13 PROCEDURE — 99233 SBSQ HOSP IP/OBS HIGH 50: CPT | Performed by: INTERNAL MEDICINE

## 2025-04-13 PROCEDURE — 36415 COLL VENOUS BLD VENIPUNCTURE: CPT

## 2025-04-13 PROCEDURE — 6360000002 HC RX W HCPCS: Performed by: PSYCHIATRY & NEUROLOGY

## 2025-04-13 PROCEDURE — 6360000002 HC RX W HCPCS

## 2025-04-13 PROCEDURE — 85025 COMPLETE CBC W/AUTO DIFF WBC: CPT

## 2025-04-13 PROCEDURE — 2060000000 HC ICU INTERMEDIATE R&B

## 2025-04-13 PROCEDURE — 80048 BASIC METABOLIC PNL TOTAL CA: CPT

## 2025-04-13 RX ADMIN — GABAPENTIN 800 MG: 400 CAPSULE ORAL at 08:45

## 2025-04-13 RX ADMIN — ATORVASTATIN CALCIUM 40 MG: 40 TABLET, FILM COATED ORAL at 20:54

## 2025-04-13 RX ADMIN — INSULIN LISPRO 2 UNITS: 100 INJECTION, SOLUTION INTRAVENOUS; SUBCUTANEOUS at 12:10

## 2025-04-13 RX ADMIN — WATER 1000 MG: 1 INJECTION INTRAMUSCULAR; INTRAVENOUS; SUBCUTANEOUS at 22:13

## 2025-04-13 RX ADMIN — DULOXETINE HYDROCHLORIDE 60 MG: 60 CAPSULE, DELAYED RELEASE ORAL at 20:54

## 2025-04-13 RX ADMIN — IMMUNE GLOBULIN (HUMAN) 30 G: 10 INJECTION INTRAVENOUS; SUBCUTANEOUS at 16:39

## 2025-04-13 RX ADMIN — GABAPENTIN 800 MG: 400 CAPSULE ORAL at 20:54

## 2025-04-13 RX ADMIN — LISINOPRIL 20 MG: 20 TABLET ORAL at 20:54

## 2025-04-13 RX ADMIN — GABAPENTIN 800 MG: 400 CAPSULE ORAL at 14:34

## 2025-04-13 RX ADMIN — SODIUM CHLORIDE, PRESERVATIVE FREE 10 ML: 5 INJECTION INTRAVENOUS at 08:45

## 2025-04-13 RX ADMIN — INSULIN LISPRO 2 UNITS: 100 INJECTION, SOLUTION INTRAVENOUS; SUBCUTANEOUS at 20:53

## 2025-04-13 RX ADMIN — OXYBUTYNIN CHLORIDE 10 MG: 10 TABLET, EXTENDED RELEASE ORAL at 20:55

## 2025-04-13 NOTE — PLAN OF CARE
Problem: Pain  Goal: Verbalizes/displays adequate comfort level or baseline comfort level  4/13/2025 1649 by Gabriella Ayala RN  Outcome: Progressing     Problem: Discharge Planning  Goal: Discharge to home or other facility with appropriate resources  4/13/2025 1649 by Gabriella Ayala, RN  Outcome: Progressing     Problem: Chronic Conditions and Co-morbidities  Goal: Patient's chronic conditions and co-morbidity symptoms are monitored and maintained or improved  4/13/2025 1649 by Gabriella Ayala RN  Outcome: Progressing     Problem: Safety - Adult  Goal: Free from fall injury  4/13/2025 1649 by Gabriella Ayala, RN  Outcome: Progressing  Pt assessed as a fall risk this shift. Remains free from falls and accidental injury at this time. Fall precautions in place, including falling star sign and fall risk band on pt. Floor free from obstacles, and bed is locked and in lowest position. Adequate lighting provided.  Pt encouraged to call  for any need.  Bed alarm activated.

## 2025-04-13 NOTE — PLAN OF CARE
Problem: Chronic Conditions and Co-morbidities  Goal: Patient's chronic conditions and co-morbidity symptoms are monitored and maintained or improved  4/13/2025 0439 by Brittany Centeno RN  Outcome: Progressing     Problem: Discharge Planning  Goal: Discharge to home or other facility with appropriate resources  4/13/2025 0439 by Brittany Centeno RN  Outcome: Progressing     Problem: Pain  Goal: Verbalizes/displays adequate comfort level or baseline comfort level  4/13/2025 0439 by Brittany Centeno RN  Outcome: Progressing     Problem: Safety - Adult  Goal: Free from fall injury  Outcome: Progressing     Problem: ABCDS Injury Assessment  Goal: Absence of physical injury  Outcome: Progressing

## 2025-04-13 NOTE — CARE COORDINATION
DISCHARGE PLANNING NOTE:    Writer is following for potential discharge placement. Writer met with pt for update on placement needs, pt states he wishes to return home, denies need for VNS. Pt states his wife will be at the hospital later for any further discussion.    Electronically signed by PONCHO Rodriguez on 4/13/2025 at 10:57 AM

## 2025-04-14 LAB
GLUCOSE BLD-MCNC: 186 MG/DL (ref 75–110)
GLUCOSE BLD-MCNC: 193 MG/DL (ref 75–110)
GLUCOSE BLD-MCNC: 212 MG/DL (ref 75–110)
GLUCOSE BLD-MCNC: 254 MG/DL (ref 75–110)
WEST NILE IGG, CSF: 0 IV
WEST NILE IGM ANTIBODY CSF: 0 IV

## 2025-04-14 PROCEDURE — 6370000000 HC RX 637 (ALT 250 FOR IP)

## 2025-04-14 PROCEDURE — 1200000000 HC SEMI PRIVATE

## 2025-04-14 PROCEDURE — 97116 GAIT TRAINING THERAPY: CPT

## 2025-04-14 PROCEDURE — 6360000002 HC RX W HCPCS

## 2025-04-14 PROCEDURE — 6360000002 HC RX W HCPCS: Performed by: PSYCHIATRY & NEUROLOGY

## 2025-04-14 PROCEDURE — 6360000002 HC RX W HCPCS: Performed by: INTERNAL MEDICINE

## 2025-04-14 PROCEDURE — 2500000003 HC RX 250 WO HCPCS: Performed by: STUDENT IN AN ORGANIZED HEALTH CARE EDUCATION/TRAINING PROGRAM

## 2025-04-14 PROCEDURE — 99223 1ST HOSP IP/OBS HIGH 75: CPT | Performed by: PHYSICAL MEDICINE & REHABILITATION

## 2025-04-14 PROCEDURE — 82947 ASSAY GLUCOSE BLOOD QUANT: CPT

## 2025-04-14 PROCEDURE — 2500000003 HC RX 250 WO HCPCS: Performed by: INTERNAL MEDICINE

## 2025-04-14 PROCEDURE — 99233 SBSQ HOSP IP/OBS HIGH 50: CPT | Performed by: INTERNAL MEDICINE

## 2025-04-14 PROCEDURE — 2500000003 HC RX 250 WO HCPCS

## 2025-04-14 PROCEDURE — 6370000000 HC RX 637 (ALT 250 FOR IP): Performed by: INTERNAL MEDICINE

## 2025-04-14 RX ADMIN — ENOXAPARIN SODIUM 30 MG: 100 INJECTION SUBCUTANEOUS at 20:52

## 2025-04-14 RX ADMIN — INSULIN LISPRO 2 UNITS: 100 INJECTION, SOLUTION INTRAVENOUS; SUBCUTANEOUS at 17:29

## 2025-04-14 RX ADMIN — ATORVASTATIN CALCIUM 40 MG: 40 TABLET, FILM COATED ORAL at 20:51

## 2025-04-14 RX ADMIN — POLYETHYLENE GLYCOL 3350 17 G: 17 POWDER, FOR SOLUTION ORAL at 10:24

## 2025-04-14 RX ADMIN — OXYBUTYNIN CHLORIDE 10 MG: 10 TABLET, EXTENDED RELEASE ORAL at 20:51

## 2025-04-14 RX ADMIN — INSULIN LISPRO 2 UNITS: 100 INJECTION, SOLUTION INTRAVENOUS; SUBCUTANEOUS at 10:04

## 2025-04-14 RX ADMIN — SODIUM CHLORIDE, PRESERVATIVE FREE 10 ML: 5 INJECTION INTRAVENOUS at 20:52

## 2025-04-14 RX ADMIN — WATER 1000 MG: 1 INJECTION INTRAMUSCULAR; INTRAVENOUS; SUBCUTANEOUS at 20:53

## 2025-04-14 RX ADMIN — GABAPENTIN 800 MG: 400 CAPSULE ORAL at 14:45

## 2025-04-14 RX ADMIN — LISINOPRIL 20 MG: 20 TABLET ORAL at 20:52

## 2025-04-14 RX ADMIN — GABAPENTIN 800 MG: 400 CAPSULE ORAL at 20:52

## 2025-04-14 RX ADMIN — IMMUNE GLOBULIN (HUMAN) 30 G: 10 INJECTION INTRAVENOUS; SUBCUTANEOUS at 17:38

## 2025-04-14 RX ADMIN — GABAPENTIN 800 MG: 400 CAPSULE ORAL at 10:03

## 2025-04-14 RX ADMIN — INSULIN LISPRO 4 UNITS: 100 INJECTION, SOLUTION INTRAVENOUS; SUBCUTANEOUS at 20:52

## 2025-04-14 RX ADMIN — DULOXETINE HYDROCHLORIDE 60 MG: 60 CAPSULE, DELAYED RELEASE ORAL at 20:51

## 2025-04-14 RX ADMIN — MICONAZOLE NITRATE: 20 POWDER TOPICAL at 20:53

## 2025-04-14 NOTE — PLAN OF CARE
Problem: Chronic Conditions and Co-morbidities  Goal: Patient's chronic conditions and co-morbidity symptoms are monitored and maintained or improved  4/14/2025 0235 by Brittany Centeno RN  Outcome: Progressing     Problem: Discharge Planning  Goal: Discharge to home or other facility with appropriate resources  4/14/2025 0235 by Brittany Centeno RN  Outcome: Progressing     Problem: Pain  Goal: Verbalizes/displays adequate comfort level or baseline comfort level  4/14/2025 0235 by Brittany Centeno RN  Outcome: Progressing     Problem: Safety - Adult  Goal: Free from fall injury  4/14/2025 0235 by Brittany Centeno RN  Outcome: Progressing     Problem: ABCDS Injury Assessment  Goal: Absence of physical injury  4/14/2025 0235 by Brittany Centeno RN  Outcome: Progressing

## 2025-04-14 NOTE — CARE COORDINATION
ONGOING DISCHARGE PLAN:    Patient is alert and oriented x4.    Spoke with patient regarding discharge plan and patient confirms that plan is still to discharge to home with no needs    IVIG today day 4    ATB    Patient will think about [facility] PM&R     Will continue to follow for additional discharge needs.    If patient is discharged prior to next notation, then this note serves as note for discharge by case management.    Electronically signed by Isabel Bell on 4/14/2025 at 2:07 PM

## 2025-04-14 NOTE — CONSULTS
[facility]   IN-PATIENT SERVICE      NEUROLOGY CONSULT  NOTE            Date:   4/11/2025  Patient name:  Bandar Willard  Date of admission:  4/10/2025  YOB: 1965      Chief Complaint:     Chief Complaint   Patient presents with    Extremity Weakness     Bilat legs    Fall     X3 times    Head Injury     From fall @ 1pm       Reason for Consult:      Lower extremity weakness    History of Present Illness:     59-year-old right-handed male with past medical history of hypertension, hyperlipidemia, diabetes, diabetic neuropathy, who presented for lower extremity weakness, falls.  History obtained from patient and chart review.    Over the last 1.5 weeks, patient states that he has been having issues with bilateral lower extremity weakness, gait imbalance.  He has had about 3 falls.  He fell again yesterday and had head trauma after losing balance.  No LOC. States that his legs give out and he can't maintain his balance.  Due to worsening symptoms, he presented to the ED.  He denies any symptoms in the arms.  He has chronic lower extremity numbness and paresthesias from diabetic neuropathy-this is unchanged.  Has history of some mild chronic back pain which is also unchanged.  Denies neck pain.  No bowel or bladder symptoms.  No headache, focality to his symptoms, no visual changes, diplopia, dysarthria, dysphagia, dizziness.  No recent fevers, chills, infectious signs or symptoms.  No recent vaccinations.  He was found to have a UTI in the ED.    He denies any prior similar symptoms.  At baseline, has no issues with ambulation or balance.  Does have chronic diabetic neuropathy.  His diabetes is uncontrolled, last A1c 10.9.  No prior history of stroke, TIA.  No family history of neuropathy.  He is a current smoker, denies alcohol or drug use.    Past Medical History:     Past Medical History:   Diagnosis Date    Chronic cellulitis     Diabetes mellitus (HCC)     Hyperlipidemia     Hypertension     
Inpatient consult to Orthopedic Surgery  Consult performed by: Turner Rojas MD  Consult ordered by: Asha Potts APRN - CNP        Patient: Bandar Willard  Unit/Bed: 2103/2103-01  YOB: 1965  MRN: [id number]  Acct: [id number]   Admitting Diagnosis: Falls [R29.6]  Bilateral leg weakness [R29.898]  Acute cystitis without hematuria [N30.00]  Weakness of both lower extremities [R29.898]  Admit Date:  4/10/2025  Hospital Day: 1    Subjective:    Patient is having problems with  leg weakness causing patient to fall  Extremity Weakness    Fall    Head Injury       Patient Seen, Chart, Labs, Radiologystudies, and Consults reviewed.      Consult note from neurology history and physical and ED notes all reviewed agreed with    On close questioning with respect to low back pain sciatica and neurogenic claudication symptoms patient does have some degree but less than expected chronic symptoms of neurogenic claudication has had some intermittent bouts of sciatica and some degree of low back pain.    Patient reports his arm strength is always been a little bit less than he would like but has not noted significant upper extremity weakness    Patient reports his noted weakness is greatest in his quads and difficulty locking his knee thus causing him to fall    Patient denies neck pain cervical radicular pain      Objective:   BP (!) 151/90   Pulse 92   Temp 97.9 °F (36.6 °C) (Oral)   Resp 16   Ht 1.778 m (5' 10\")   Wt 108.9 kg (240 lb)   SpO2 99%   BMI 34.44 kg/m² No intake or output data in the 24 hours ending 04/11/25 1803  Review of Systems  Physical Exam  Vitals and nursing note reviewed.   Constitutional:       Appearance: He is well-developed.   HENT:      Head: Normocephalic and atraumatic.      Nose: Nose normal.   Eyes:      Conjunctiva/sclera: Conjunctivae normal.   Pulmonary:      Effort: Pulmonary effort is normal. No respiratory distress.   Musculoskeletal:      Cervical back: Normal range of 
walker  Assistance: Contact guard assistance  Comment: pt utilized RW overtop box step; no LOB noted  OT:   ADLS-  ADL  Feeding: Setup, Based on clinical judgement  Grooming: Setup, Based on clinical judgement  UE Bathing: Stand by assistance, Based on clinical judgement  LE Bathing: Minimal assistance, Based on clinical judgement  UE Dressing: Stand by assistance, Based on clinical judgement  LE Dressing: Minimal assistance, Based on clinical judgement  Putting On/Taking Off Footwear: Minimal assistance, Based on clinical judgement  Toileting: Contact guard assistance, Based on clinical judgement  Additional Comments: ADL scores based on clinical reasoning unless otherwise noted. Pt currently limited due to decreased strength, balance, activity tolerance, and pain impacting safety and independence with self care tasks  Product Used : Soap and water     SLP:      Past Medical History:        Diagnosis Date    Chronic cellulitis     Diabetes mellitus (HCC)     Hyperlipidemia     Hypertension        Past Surgical History:    No past surgical history on file.    Allergies:    No Known Allergies     Current Medications:   Current Facility-Administered Medications: calcium carbonate (TUMS) chewable tablet 500 mg, 500 mg, Oral, TID PRN  DULoxetine (CYMBALTA) extended release capsule 60 mg, 60 mg, Oral, Nightly  lisinopril (PRINIVIL;ZESTRIL) tablet 20 mg, 20 mg, Oral, Nightly  oxyBUTYnin (DITROPAN-XL) extended release tablet 10 mg, 10 mg, Oral, Nightly  atorvastatin (LIPITOR) tablet 40 mg, 40 mg, Oral, Nightly  immune globulin (GAMUNEX-C) 10% solution 30 g, 0.4 g/kg (Ideal), IntraVENous, Q24H  sodium chloride flush 0.9 % injection 10 mL, 10 mL, IntraVENous, PRN  0.9 % sodium chloride infusion, , IntraVENous, PRN  potassium chloride (KLOR-CON M) extended release tablet 40 mEq, 40 mEq, Oral, PRN **OR** potassium bicarb-citric acid (EFFER-K) effervescent tablet 40 mEq, 40 mEq, Oral, PRN **OR** potassium chloride 10 mEq/100 mL

## 2025-04-14 NOTE — PLAN OF CARE
Problem: Pain  Goal: Verbalizes/displays adequate comfort level or baseline comfort level  Outcome: Progressing, Pt educated on non-pharmacological pain interventions. PRN pain medications administered when appropriated.        Problem: Safety - Adult  Goal: Free from fall injury  Outcome: Progressing, Patient remains free of incidence/ injury. Bed remains in low position. Side rails up x2.

## 2025-04-15 VITALS
BODY MASS INDEX: 33.93 KG/M2 | SYSTOLIC BLOOD PRESSURE: 143 MMHG | RESPIRATION RATE: 18 BRPM | DIASTOLIC BLOOD PRESSURE: 92 MMHG | TEMPERATURE: 97.9 F | WEIGHT: 236.99 LBS | HEART RATE: 71 BPM | HEIGHT: 70 IN | OXYGEN SATURATION: 97 %

## 2025-04-15 LAB
B BURGDOR AB CSF IA-ACNC: 0.06 IV
GLUCOSE BLD-MCNC: 162 MG/DL (ref 75–110)
GLUCOSE BLD-MCNC: 185 MG/DL (ref 75–110)
GLUCOSE BLD-MCNC: 216 MG/DL (ref 75–110)

## 2025-04-15 PROCEDURE — 99231 SBSQ HOSP IP/OBS SF/LOW 25: CPT | Performed by: PSYCHIATRY & NEUROLOGY

## 2025-04-15 PROCEDURE — 6370000000 HC RX 637 (ALT 250 FOR IP): Performed by: INTERNAL MEDICINE

## 2025-04-15 PROCEDURE — 6370000000 HC RX 637 (ALT 250 FOR IP)

## 2025-04-15 PROCEDURE — 99239 HOSP IP/OBS DSCHRG MGMT >30: CPT | Performed by: INTERNAL MEDICINE

## 2025-04-15 PROCEDURE — 82947 ASSAY GLUCOSE BLOOD QUANT: CPT

## 2025-04-15 PROCEDURE — 6360000002 HC RX W HCPCS: Performed by: INTERNAL MEDICINE

## 2025-04-15 PROCEDURE — 6360000002 HC RX W HCPCS: Performed by: PSYCHIATRY & NEUROLOGY

## 2025-04-15 RX ORDER — NITROFURANTOIN 25; 75 MG/1; MG/1
100 CAPSULE ORAL EVERY 12 HOURS SCHEDULED
Qty: 14 CAPSULE | Refills: 0 | Status: SHIPPED | OUTPATIENT
Start: 2025-04-15 | End: 2025-04-22

## 2025-04-15 RX ORDER — NITROFURANTOIN 25; 75 MG/1; MG/1
100 CAPSULE ORAL EVERY 12 HOURS SCHEDULED
Status: DISCONTINUED | OUTPATIENT
Start: 2025-04-15 | End: 2025-04-15 | Stop reason: HOSPADM

## 2025-04-15 RX ADMIN — INSULIN LISPRO 2 UNITS: 100 INJECTION, SOLUTION INTRAVENOUS; SUBCUTANEOUS at 07:40

## 2025-04-15 RX ADMIN — GABAPENTIN 800 MG: 400 CAPSULE ORAL at 08:21

## 2025-04-15 RX ADMIN — ENOXAPARIN SODIUM 30 MG: 100 INJECTION SUBCUTANEOUS at 08:21

## 2025-04-15 RX ADMIN — MICONAZOLE NITRATE: 20 POWDER TOPICAL at 08:22

## 2025-04-15 RX ADMIN — IMMUNE GLOBULIN (HUMAN) 30 G: 10 INJECTION INTRAVENOUS; SUBCUTANEOUS at 14:35

## 2025-04-15 RX ADMIN — INSULIN LISPRO 2 UNITS: 100 INJECTION, SOLUTION INTRAVENOUS; SUBCUTANEOUS at 11:49

## 2025-04-15 RX ADMIN — POLYETHYLENE GLYCOL 3350 17 G: 17 POWDER, FOR SOLUTION ORAL at 08:20

## 2025-04-15 RX ADMIN — NITROFURANTOIN MONOHYDRATE/MACROCRYSTALS 100 MG: 25; 75 CAPSULE ORAL at 11:20

## 2025-04-15 RX ADMIN — GABAPENTIN 800 MG: 400 CAPSULE ORAL at 14:10

## 2025-04-15 ASSESSMENT — PAIN SCALES - GENERAL: PAINLEVEL_OUTOF10: 0

## 2025-04-15 NOTE — PROGRESS NOTES
[facility] Neurology   IN-PATIENT SERVICE      NEUROLOGY PROGRESS  NOTE                Interval History:     Doing well, no current complaints.  Received his last dose of IVIG today.  Eager to be discharged.    History of Present Illness:     Obtain for my partner:    59-year-old right-handed male with past medical history of hypertension, hyperlipidemia, diabetes, diabetic neuropathy, who presented for lower extremity weakness, falls.  History obtained from patient and chart review.     Over the last 1.5 weeks, patient states that he has been having issues with bilateral lower extremity weakness, gait imbalance.  He has had about 3 falls.  He fell again yesterday and had head trauma after losing balance.  No LOC. States that his legs give out and he can't maintain his balance.  Due to worsening symptoms, he presented to the ED.  He denies any symptoms in the arms.  He has chronic lower extremity numbness and paresthesias from diabetic neuropathy-this is unchanged.  Has history of some mild chronic back pain which is also unchanged.  Denies neck pain.  No bowel or bladder symptoms.  No headache, focality to his symptoms, no visual changes, diplopia, dysarthria, dysphagia, dizziness.  No recent fevers, chills, infectious signs or symptoms.  No recent vaccinations.  He was found to have a UTI in the ED.     He denies any prior similar symptoms.  At baseline, has no issues with ambulation or balance.  Does have chronic diabetic neuropathy.  His diabetes is uncontrolled, last A1c 10.9.  No prior history of stroke, TIA.  No family history of neuropathy.  He is a current smoker, denies alcohol or drug use.     Physical Exam:   BP (!) 143/92   Pulse 71   Temp 97.9 °F (36.6 °C)   Resp 18   Ht 1.778 m (5' 10\")   Wt 107.5 kg (236 lb 15.9 oz)   SpO2 97%   BMI 34.01 kg/m²   Temp (24hrs), Av.2 °F (36.8 °C), Min:97.9 °F (36.6 °C), Max:98.4 °F (36.9 °C)      Neurological examination:    Mental status   Alert and 
    [facility]   IN-PATIENT SERVICE   [facility]    HISTORY AND PHYSICAL EXAMINATION            Date:   4/12/2025  Patient name:  Bandar Willard  Date of admission:  4/10/2025  4:06 PM  MRN:   [id number]  Account:  [id number]  YOB: 1965  PCP:    Makenna Hernandez MD  Room:   [address] [place] [address]  Code Status:    Full Code    Chief Complaint:     Chief Complaint   Patient presents with    Extremity Weakness     Bilat legs    Fall     X3 times    Head Injury     From fall @ 1pm       History Obtained From:     patient    History of Present Illness:     The patient is a 59 y.o.  Non- / non  male who presents with Extremity Weakness (Bilat legs), Fall (X3 times), and Head Injury (From fall @ 1pm)   and he is admitted to the hospital for the management of    Bandar Willard is a 59 y.o. Non- / non  male who presents with Extremity Weakness (Bilat legs), Fall (X3 times), and Head Injury (From fall @ 1pm)   and is admitted to the hospital for the management of Weakness of both lower extremities.     Patient arrives to the ED with bilateral lower extremity weakness. The patient has a significant medical history of hypertension, hyperlipidemia, and diabetes.     According to the patient the symptoms started about a week and a half ago.  The patient denies any instigating events, traumas, or medication changes that could have caused this.  Patient does endorse that he has had 3 falls since symptom onset.  The patient does note that he feels as though his legs are weaker than normal and he has noticed that his endurance is greatly impacted.  Patient states that he will be ambulating and feels as though his knees will buckle and give out causing him to fall.  Patient has decreased sensation in his bilateral lower extremities at baseline due to diabetes, but he does endorse that his sensation has decreased even more from baseline in the last week and a half.  Patient states that he 
    [facility]   IN-PATIENT SERVICE   [facility]    Progress note              Date:   4/14/2025  Patient name:  Bandar Willard  Date of admission:  4/10/2025  4:06 PM  MRN:   [id number]  Account:  [id number]  YOB: 1965  PCP:    Makenna Hernandez MD  Room:   2103/2103-  Code Status:    Full Code    Chief Complaint:     Chief Complaint   Patient presents with    Extremity Weakness     Bilat legs    Fall     X3 times    Head Injury     From fall @ 1pm       History Obtained From:     patient    History of Present Illness:     The patient is a 59 y.o.  Non- / non  male who presents with Extremity Weakness (Bilat legs), Fall (X3 times), and Head Injury (From fall @ 1pm)   and he is admitted to the hospital for the management of    Bandar Willard is a 59 y.o. Non- / non  male who presents with Extremity Weakness (Bilat legs), Fall (X3 times), and Head Injury (From fall @ 1pm)   and is admitted to the hospital for the management of Weakness of both lower extremities.     Patient arrives to the ED with bilateral lower extremity weakness. The patient has a significant medical history of hypertension, hyperlipidemia, and diabetes.     According to the patient the symptoms started about a week and a half ago.  The patient denies any instigating events, traumas, or medication changes that could have caused this.  Patient does endorse that he has had 3 falls since symptom onset.  The patient does note that he feels as though his legs are weaker than normal and he has noticed that his endurance is greatly impacted.  Patient states that he will be ambulating and feels as though his knees will buckle and give out causing him to fall.  Patient has decreased sensation in his bilateral lower extremities at baseline due to diabetes, but he does endorse that his sensation has decreased even more from baseline in the last week and a half.  Patient states that he feels like the 
    [facility] Internal Medicine  Nikunj Jones MD; Mc Ayoub MD; Davis Mejia MD; MD Noris Romero MD; Gokul Pérez MD  [facility] Internal Medicine   IN-PATIENT SERVICE  [facility]                 Date:   4/10/2025  Patientname:  Bandar Willard  Date of admission:  4/10/2025  4:06 PM  MRN:   [id number]  Account:  [id number]  YOB: 1965  PCP:    Makenna Hernandez MD  Room:   2074/2074-01  Code Status:    Full Code      Chief Complaint:     Chief Complaint   Patient presents with    Extremity Weakness     Bilat legs    Fall     X3 times    Head Injury     From fall @ 1pm     History of Present Illness:     Bandar Willard is a 59 y.o. Non- / non  male who presents with Extremity Weakness (Bilat legs), Fall (X3 times), and Head Injury (From fall @ 1pm)   and is admitted to the hospital for the management of Weakness of both lower extremities.    Patient arrives to the ED with bilateral lower extremity weakness. The patient has a significant medical history of hypertension, hyperlipidemia, and diabetes.    According to the patient the symptoms started about a week and a half ago.  The patient denies any instigating events, traumas, or medication changes that could have caused this.  Patient does endorse that he has had 3 falls since symptom onset.  The patient does note that he feels as though his legs are weaker than normal and he has noticed that his endurance is greatly impacted.  Patient states that he will be ambulating and feels as though his knees will buckle and give out causing him to fall.  Patient has decreased sensation in his bilateral lower extremities at baseline due to diabetes, but he does endorse that his sensation has decreased even more from baseline in the last week and a half.  Patient states that he feels like the muscle weakness is most concentrated to his quadricep muscles and feels as though they do not carry his weight as 
    [facility]   IN-PATIENT SERVICE   [facility]    Progress note              Date:   4/13/2025  Patient name:  Bandar Willard  Date of admission:  4/10/2025  4:06 PM  MRN:   [id number]  Account:  [id number]  YOB: 1965  PCP:    Makenna Hernandez MD  Room:   2103/2103-  Code Status:    Full Code    Chief Complaint:     Chief Complaint   Patient presents with    Extremity Weakness     Bilat legs    Fall     X3 times    Head Injury     From fall @ 1pm       History Obtained From:     patient    History of Present Illness:     The patient is a 59 y.o.  Non- / non  male who presents with Extremity Weakness (Bilat legs), Fall (X3 times), and Head Injury (From fall @ 1pm)   and he is admitted to the hospital for the management of    Bandar Willard is a 59 y.o. Non- / non  male who presents with Extremity Weakness (Bilat legs), Fall (X3 times), and Head Injury (From fall @ 1pm)   and is admitted to the hospital for the management of Weakness of both lower extremities.     Patient arrives to the ED with bilateral lower extremity weakness. The patient has a significant medical history of hypertension, hyperlipidemia, and diabetes.     According to the patient the symptoms started about a week and a half ago.  The patient denies any instigating events, traumas, or medication changes that could have caused this.  Patient does endorse that he has had 3 falls since symptom onset.  The patient does note that he feels as though his legs are weaker than normal and he has noticed that his endurance is greatly impacted.  Patient states that he will be ambulating and feels as though his knees will buckle and give out causing him to fall.  Patient has decreased sensation in his bilateral lower extremities at baseline due to diabetes, but he does endorse that his sensation has decreased even more from baseline in the last week and a half.  Patient states that he feels like the 
    [facility]   IN-PATIENT SERVICE   [facility]    Progress note              Date:   4/15/2025  Patient name:  Bandar Willard  Date of admission:  4/10/2025  4:06 PM  MRN:   [id number]  Account:  [id number]  YOB: 1965  PCP:    Makenna Hernandez MD  Room:   2063/2063-01  Code Status:    Full Code    Chief Complaint:     Chief Complaint   Patient presents with    Extremity Weakness     Bilat legs    Fall     X3 times    Head Injury     From fall @ 1pm       History Obtained From:     patient    History of Present Illness:     The patient is a 59 y.o.  Non- / non  male who presents with Extremity Weakness (Bilat legs), Fall (X3 times), and Head Injury (From fall @ 1pm)   and he is admitted to the hospital for the management of    Bandar Willard is a 59 y.o. Non- / non  male who presents with Extremity Weakness (Bilat legs), Fall (X3 times), and Head Injury (From fall @ 1pm)   and is admitted to the hospital for the management of Weakness of both lower extremities.     Patient arrives to the ED with bilateral lower extremity weakness. The patient has a significant medical history of hypertension, hyperlipidemia, and diabetes.     According to the patient the symptoms started about a week and a half ago.  The patient denies any instigating events, traumas, or medication changes that could have caused this.  Patient does endorse that he has had 3 falls since symptom onset.  The patient does note that he feels as though his legs are weaker than normal and he has noticed that his endurance is greatly impacted.  Patient states that he will be ambulating and feels as though his knees will buckle and give out causing him to fall.  Patient has decreased sensation in his bilateral lower extremities at baseline due to diabetes, but he does endorse that his sensation has decreased even more from baseline in the last week and a half.  Patient states that he feels like the 
  [facility] Neurology   IN-PATIENT SERVICE      NEUROLOGY PROGRESS  NOTE            Date:   4/13/2025  Patient name:  Bandar Willard  Date of admission:  4/10/2025  YOB: 1965      Interval History:     4/13: Continues to feel improvement in weakness.  Gait improved.     4/12: No acute events.  LP was done yesterday.  Protein 75.  Started on IVIG.  Tolerating well.  States that weakness has improved.    History of Present Illness:     59-year-old right-handed male with past medical history of hypertension, hyperlipidemia, diabetes, diabetic neuropathy, who presented for lower extremity weakness, falls.  History obtained from patient and chart review.     Over the last 1.5 weeks, patient states that he has been having issues with bilateral lower extremity weakness, gait imbalance.  He has had about 3 falls.  He fell again yesterday and had head trauma after losing balance.  No LOC. States that his legs give out and he can't maintain his balance.  Due to worsening symptoms, he presented to the ED.  He denies any symptoms in the arms.  He has chronic lower extremity numbness and paresthesias from diabetic neuropathy-this is unchanged.  Has history of some mild chronic back pain which is also unchanged.  Denies neck pain.  No bowel or bladder symptoms.  No headache, focality to his symptoms, no visual changes, diplopia, dysarthria, dysphagia, dizziness.  No recent fevers, chills, infectious signs or symptoms.  No recent vaccinations.  He was found to have a UTI in the ED.     He denies any prior similar symptoms.  At baseline, has no issues with ambulation or balance.  Does have chronic diabetic neuropathy.  His diabetes is uncontrolled, last A1c 10.9.  No prior history of stroke, TIA.  No family history of neuropathy.  He is a current smoker, denies alcohol or drug use.    Past Medical History:     Past Medical History:   Diagnosis Date    Chronic cellulitis     Diabetes mellitus (HCC)     Hyperlipidemia     
[facility]   Occupational Therapy Evaluation  Date: 25  Patient Name: Bandar Willard       Room:   MRN: [id number]  Account: [id number]   : 1965  (59 y.o.) Gender: male     Discharge Recommendations:  Further Occupational Therapy is recommended upon facility discharge.    OT Equipment Recommendations  Other: TBD    Referring Practitioner: Asha Potts APRN - CNP  Diagnosis: Falls        Treatment Diagnosis: Impaired self care status    Past Medical History:  has a past medical history of Chronic cellulitis, Diabetes mellitus (HCC), Hyperlipidemia, and Hypertension.    Past Surgical History:   has no past surgical history on file.    Restrictions  Restrictions/Precautions  Restrictions/Precautions: Fall Risk, General Precautions  Required Braces or Orthoses?: No  Implants Present? :  (Pt denies)      Vitals  Vitals  O2 Device: None (Room air)     Subjective  Subjective: \"Its like my legs just stopped working all of a sudden.\" Pt was pleasant and agreeable to OT/PT eval  Comments: OK per RN Harry for OT/PT eval  Pain  Pre-Pain: 6  Post-Pain: 6  Pain Location: Right, Left (BLE)  Pain Descriptor: Sore    Social/Functional History  Social/Functional History  Lives With: Spouse, Daughter  Type of Home: House  Home Layout: One level  Home Access: Level entry  Bathroom Shower/Tub: Tub/Shower unit, Curtain  Bathroom Toilet: Handicap height (SInk close by)  Bathroom Equipment: Built-in shower seat, Hand-held shower  Bathroom Accessibility: Not accessible  Home Equipment:  (CPAP)  Has the patient had two or more falls in the past year or any fall with injury in the past year?: Yes  Prior Level of Assist for ADLs: Independent  Prior Level of Assist for Homemaking: Independent (Shares with wife)  Homemaking Responsibilities: Yes  Prior Level of Assist for Ambulation: Independent household ambulator, with or without device, Independent community ambulator, with or without 
Dr Rojas was called about new consult. Patient added to list per request   
Dr. Burgess (PM&R) notified of consult.  
Neurology ---       Patient not seen on this date as he was not present in the room after checking two different times.  Continue IVIG treatment.  Will continue to follow.    Alex Marie DO  [facility] [place]  Neurology     
New consult sent to Asha Potts  
New consult sent to Dr Kaplan, Neurology   
Patient and wife were given discharge instructions and all questions answered. IV was removed without complications. All personal belongings were gathered. Patient was taken down by wheelchair.   
Patient tolerated lumbar puncture without distress. 9.5 ml of clear fluid removed. Dressing to site. Patient returned to room. Nurse updated.     
Patients home BIPAP/CPAP checked for integrity and cleanliness.  Equipment sticker placed on home unit after check completed.  
Pharmacy Medication History Note      List of current medications patient is taking is complete.    Source of information: patient's family, [employer], [facility], Care Everywhere     Changes made to medication list:  Medications removed (include reason, ex. therapy complete or physician discontinued, noncompliance):  None     Medications flagged for provider review:  Invokana - changed to Jardiance   Humalog mix - patient not taking   Ozempic - changed to Mounjaro     Medications added/doses adjusted:  Duloxetine 60 mg daily   Jardiance 25 mg daily   Gabapentin 400 mg take 2 capsules three times daily   Oxybutynin ER 10 mg daily   Mounjaro 10 mg weekly on Mondays     Other notes (ex. Recent course of antibiotics, Coumadin dosing):  Per [facility], last fill of gabapentin was on 3/11/25 with quantity 180 for 30 days.       Current Home Medication List at Time of Admission:  Prior to Admission medications    Medication Sig   DULoxetine (CYMBALTA) 60 MG extended release capsule Take 1 capsule by mouth daily   empagliflozin (JARDIANCE) 25 MG tablet Take 1 tablet by mouth daily   gabapentin (NEURONTIN) 400 MG capsule Take 2 capsules by mouth 3 times daily.   oxyBUTYnin (DITROPAN-XL) 10 MG extended release tablet Take 1 tablet by mouth daily   Tirzepatide (MOUNJARO) 10 MG/0.5ML SOAJ Inject 10 mg into the skin once a week Mondays   insulin lispro prot & lispro (HUMALOG MIX 50/50 KWIKPEN) (50-50) 100 UNIT per ML SUPN injection pen Inject 60 Units into the skin 3 times daily (with meals)  Patient not taking: Reported on 4/10/2025   canagliflozin (INVOKANA) 300 MG TABS tablet Take 300 mg by mouth daily  Patient not taking: Reported on 4/10/2025   atorvastatin (LIPITOR) 40 MG tablet Take 1 tablet by mouth daily   Semaglutide,0.25 or 0.5MG/DOS, (OZEMPIC, 0.25 OR 0.5 MG/DOSE,) 2 MG/1.5ML SOPN Inject 0.5 mg into the skin once a week wednesdays  Patient not taking: Reported on 4/10/2025   lisinopril (PRINIVIL;ZESTRIL) 20 MG tablet Take 
Physical Therapy  [facility]   Physical Therapy Treatment  Date: 25  Patient Name: Bandar Willard       Room: -  MRN: [id number]  Account: [id number]   : 1965  (59 y.o.) Gender: male     Discharge Recommendations:  Discharge Recommendations: Continue to assess pending progress, Patient would benefit from continued therapy after discharge     PT D/C Equipment  Equipment Needed:  (continue to assess)    General  Chart Reviewed: Yes  Additional Pertinent Hx: Per H and P 4/10/25: Chief Complaint  Patient presents with   Extremity Weakness      Bilat legs   Fall      X3 times   Head Injury      From fall @ 1pm     History of Present Illness:     Bandar Willard is a 59 y.o. Non- / non  male who presents with Extremity Weakness (Bilat legs), Fall (X3 times), and Head Injury (From fall @ 1pm)   and is admitted to the hospital for the management of Weakness of both lower extremities.     Patient arrives to the ED with bilateral lower extremity weakness. The patient has a significant medical history of hypertension, hyperlipidemia, and diabetes.     According to the patient the symptoms started about a week and a half ago.  The patient denies any instigating events, traumas, or medication changes that could have caused this.  Patient does endorse that he has had 3 falls since symptom onset.  The patient does note that he feels as though his legs are weaker than normal and he has noticed that his endurance is greatly impacted.  Patient states that he will be ambulating and feels as though his knees will buckle and give out causing him to fall.  Patient has decreased sensation in his bilateral lower extremities at baseline due to diabetes, but he does endorse that his sensation has decreased even more from baseline in the last week and a half.  Patient states that he feels like the muscle weakness is most concentrated to his quadricep muscles and feels as though they do not 
Physical Therapy  [facility]   Physical Therapy Evaluation  Date: 25  Patient Name: Bandar Willard       Room: 4-01  MRN: [id number]  Account: [id number]   : 1965  (59 y.o.) Gender: male     Discharge Recommendations:  Discharge Recommendations: Continue to assess pending progress, Patient would benefit from continued therapy after discharge     PT D/C Equipment  Equipment Needed:  (continue to assess)     Past Medical History:  has a past medical history of Chronic cellulitis, Diabetes mellitus (HCC), Hyperlipidemia, and Hypertension.  Past Surgical History:   has no past surgical history on file.    Subjective  Subjective  Subjective: Pt c/o LE soreness after fall yesterday     General  Patient assessed for rehabilitation services?: Yes  Additional Pertinent Hx: Per H and P 4/10/25: Chief Complaint  Patient presents with   Extremity Weakness      Bilat legs   Fall      X3 times   Head Injury      From fall @ 1pm     History of Present Illness:     Bandar Willard is a 59 y.o. Non- / non  male who presents with Extremity Weakness (Bilat legs), Fall (X3 times), and Head Injury (From fall @ 1pm)   and is admitted to the hospital for the management of Weakness of both lower extremities.     Patient arrives to the ED with bilateral lower extremity weakness. The patient has a significant medical history of hypertension, hyperlipidemia, and diabetes.     According to the patient the symptoms started about a week and a half ago.  The patient denies any instigating events, traumas, or medication changes that could have caused this.  Patient does endorse that he has had 3 falls since symptom onset.  The patient does note that he feels as though his legs are weaker than normal and he has noticed that his endurance is greatly impacted.  Patient states that he will be ambulating and feels as though his knees will buckle and give out causing him to fall.  Patient has decreased 
Physical Therapy  [facility]   Physical Therapy Treatment  Date: 25  Patient Name: Bandar Willard       Room: 3-  MRN: [id number]  Account: [id number]   : 1965  (59 y.o.) Gender: male     Discharge Recommendations:  Discharge Recommendations: Continue to assess pending progress, Patient would benefit from continued therapy after discharge     PT D/C Equipment  Equipment Needed:  (continue to assess)    General  Patient assessed for rehabilitation services?: Yes  Additional Pertinent Hx: Per H and P 4/10/25: Chief Complaint  Patient presents with   Extremity Weakness      Bilat legs   Fall      X3 times   Head Injury      From fall @ 1pm     History of Present Illness:     Bandar Willard is a 59 y.o. Non- / non  male who presents with Extremity Weakness (Bilat legs), Fall (X3 times), and Head Injury (From fall @ 1pm)   and is admitted to the hospital for the management of Weakness of both lower extremities.     Patient arrives to the ED with bilateral lower extremity weakness. The patient has a significant medical history of hypertension, hyperlipidemia, and diabetes.     According to the patient the symptoms started about a week and a half ago.  The patient denies any instigating events, traumas, or medication changes that could have caused this.  Patient does endorse that he has had 3 falls since symptom onset.  The patient does note that he feels as though his legs are weaker than normal and he has noticed that his endurance is greatly impacted.  Patient states that he will be ambulating and feels as though his knees will buckle and give out causing him to fall.  Patient has decreased sensation in his bilateral lower extremities at baseline due to diabetes, but he does endorse that his sensation has decreased even more from baseline in the last week and a half.  Patient states that he feels like the muscle weakness is most concentrated to his quadricep muscles 
Pt informed of transfer, gave verbal report to Joelle on Med C. IVIG meds sent with pt along with all of his belongings.  
Pt wife Vickie taking home his home medications including bottles of lisinopril, jardiance, gabapentin, Lipitor, and oxybutynin. Pt agreeable and aware of this   
Surgical Progress Note    POD:      Patient doing fairly well  Vitals:    25 1130   BP: (!) 96/46   Pulse: 90   Resp: 16   Temp: 98.6 °F (37 °C)   SpO2: 98%      Temp (24hrs), Av °F (36.7 °C), Min:97.2 °F (36.2 °C), Max:98.6 °F (37 °C)       Pain Control fair  No unusual nausea    Exam: ambulatory with walker reports no subjective change in strength        Lungs:  No respiratory distress    Labs reviewed:  Labs:  WBC/Hgb/Hct/Plts:  4.6/14.9/42.4/154 (522)  BUN/Cr/glu/ALT/AST/amyl/lip:  19/0.8/--/--/--/--/-- (522)  PT/INR/PTT:  13.6/1.0/-- (909)  Na/K/Cl/CO2:  139/4.1/106/22 (522)    No intake/output data recorded.    Assessment:    Patient Active Problem List   Diagnosis    Cellulitis    Hyperlipidemia    Mixed hyperlipidemia    Essential hypertension    Type 2 diabetes mellitus with hyperglycemia (HCC)    Class 3 severe obesity with serious comorbidity and body mass index (BMI) of 45.0 to 49.9 in adult    Obstructive sleep apnea on CPAP    Bilateral leg weakness    Falls    Diabetic neuropathy (HCC)    Spinal stenosis in cervical region    Spinal stenosis, lumbar region, without neurogenic claudication       Plan:  See my orders  Continue supportive care    Turner Rojas MD MD  2025 4:18 PM  
bilaterally. (-)Hughes's sign bilaterally    Gait                  Deferred               Diagnostics:      Laboratory Testing:  CBC:   Recent Labs     04/10/25  1645 04/11/25  0547 04/12/25  0522   WBC 6.6 6.6 4.6   HGB 15.4 14.8 14.9    184 154     BMP:    Recent Labs     04/10/25  1645 04/11/25  0547 04/12/25  0522    143 139   K 4.1 4.5 4.1    105 106   CO2 25 27 22   BUN 15 17 19   CREATININE 0.9 1.0 0.8   GLUCOSE 244* 216* 156*         Lab Results   Component Value Date    CHOL 100 05/17/2024    HDL 33 (L) 05/17/2024    TRIG 128 05/17/2024    ALT 33 04/10/2025    AST 23 04/10/2025    TSH 0.95 04/11/2025    INR 1.0 04/11/2025    LABA1C 10.2 (H) 04/11/2025    [id number] 452 04/11/2025    MG 2.2 04/10/2025           Imaging/Diagnostics:      Results for orders placed during the hospital encounter of 04/10/25    MRI CERVICAL SPINE W WO CONTRAST    Narrative  EXAMINATION:  MRI OF THE CERVICAL SPINE WITHOUT AND WITH CONTRAST  4/10/2025 7:37 pm:    TECHNIQUE:  Multiplanar multisequence MRI of the cervical spine was performed without and  with the administration of intravenous contrast.    COMPARISON:  None.    HISTORY:  ORDERING SYSTEM PROVIDED HISTORY: New onset bilateral lower extremity  weakness, repeat falls  TECHNOLOGIST PROVIDED HISTORY:  New onset bilateral lower extremity weakness, repeat falls  Decision Support Exception - unselect if not a suspected or confirmed  emergency medical condition->Emergency Medical Condition (MA)  Reason for Exam: New onset bilateral lower extremity weakness, repeat falls    FINDINGS:  BONES/ALIGNMENT: There is normal alignment of the spine. The vertebral body  heights are maintained.  The bone marrow signal appears unremarkable.  There  is multilevel disc desiccation with mild disc height loss at C4-C5, C5-C6 and  C6-C7.    SPINAL CORD: No abnormal cervical spinal cord signal or enhancement is seen.    SOFT TISSUES: There is focal enhancement along the tip of

## 2025-04-15 NOTE — DISCHARGE INSTR - COC
Continuity of Care Form    Patient Name: Bandar Willard   :  1965  MRN:  [id number]    Admit date:  4/10/2025  Discharge date:  ***    Code Status Order: Full Code   Advance Directives:     Admitting Physician:  Sesar Monaco MD  PCP: Makenna Hernandez MD    Discharging Nurse: ***  Discharging Hospital Unit/Room#: 2063/3-01  Discharging Unit Phone Number: ***    Emergency Contact:   Extended Emergency Contact Information  Primary Emergency Contact: Vickie Willard  Home Phone: [phone]  Relation: Spouse    Past Surgical History:  No past surgical history on file.    Immunization History:   Immunization History   Administered Date(s) Administered    Influenza, FLUARIX, FLULAVAL, FLUZONE (age 6 mo+) and AFLURIA, (age 3 y+), Quadv PF, 0.5mL 2020       Active Problems:  Patient Active Problem List   Diagnosis Code    Cellulitis L03.90    Hyperlipidemia E78.5    Mixed hyperlipidemia E78.2    Essential hypertension I10    Type 2 diabetes mellitus with hyperglycemia (HCC) E11.65    Class 3 severe obesity with serious comorbidity and body mass index (BMI) of 45.0 to 49.9 in adult E66.813, Z68.42    Obstructive sleep apnea on CPAP G47.33    Bilateral leg weakness R29.898    Falls R29.6    Diabetic neuropathy (HCC) E11.40    Spinal stenosis in cervical region M48.02    Spinal stenosis, lumbar region, without neurogenic claudication M48.061    CSF abnormal R83.9       Isolation/Infection:   Isolation            No Isolation          Patient Infection Status    None to display         Nurse Assessment:  Last Vital Signs: BP (!) 143/92   Pulse 71   Temp 97.9 °F (36.6 °C)   Resp 18   Ht 1.778 m (5' 10\")   Wt 107.5 kg (236 lb 15.9 oz)   SpO2 97%   BMI 34.01 kg/m²     Last documented pain score (0-10 scale): Pain Level: 0  Last Weight:   Wt Readings from Last 1 Encounters:   25 107.5 kg (236 lb 15.9 oz)     Mental Status:  {IP PT MENTAL STATUS:}    IV Access:  { WEI IV ACCESS:[id number]}    Nursing

## 2025-04-15 NOTE — PLAN OF CARE
Problem: Chronic Conditions and Co-morbidities  Goal: Patient's chronic conditions and co-morbidity symptoms are monitored and maintained or improved  4/15/2025 0629 by aRmona Houston  Outcome: Adequate for Discharge     Problem: Discharge Planning  Goal: Discharge to home or other facility with appropriate resources  4/15/2025 0629 by Ramona Houston  Outcome: Adequate for Discharge     Problem: Pain  Goal: Verbalizes/displays adequate comfort level or baseline comfort level  4/15/2025 0629 by Ramona Houston  Outcome: Adequate for Discharge     Problem: Safety - Adult  Goal: Free from fall injury  4/15/2025 0629 by Ramona Houston  Outcome: Adequate for Discharge     Problem: ABCDS Injury Assessment  Goal: Absence of physical injury  4/15/2025 0629 by Ramona Houston  Outcome: Adequate for Discharge

## 2025-04-15 NOTE — PLAN OF CARE
Problem: Chronic Conditions and Co-morbidities  Goal: Patient's chronic conditions and co-morbidity symptoms are monitored and maintained or improved  4/15/2025 1502 by Rosina Lainez RN  Outcome: Completed     Problem: Discharge Planning  Goal: Discharge to home or other facility with appropriate resources  4/15/2025 1502 by Rosina Lainez, RN  Outcome: Completed     Problem: Pain  Goal: Verbalizes/displays adequate comfort level or baseline comfort level  4/15/2025 1502 by Rosina Lainez, RN  Outcome: Completed     Problem: Safety - Adult  Goal: Free from fall injury  4/15/2025 1502 by Rosina Lainez RN  Outcome: Completed     Problem: ABCDS Injury Assessment  Goal: Absence of physical injury  4/15/2025 1502 by Rosina Lainez, RN  Outcome: Completed

## 2025-04-15 NOTE — CARE COORDINATION
DISCHARGE PLANNING NOTE:    Shant  reached out to this writer to offer assistance with discharge plans.     Dulce, post discharge , can be reached at [phone], extension [phone].    [facility] [phone], for discharge needs for DME or home health.     Electronically signed by Niyah Martins RN on 4/15/2025 at 12:03 PM

## 2025-04-16 LAB
CSF ISOELECTRIC FOCUSING INTERPRETATION: NORMAL
OLIGOCLONAL BANDS CSF IEF: 0 BANDS (ref 0–1)
OLIGOCLONAL BANDS: NEGATIVE

## 2025-04-22 ENCOUNTER — OFFICE VISIT (OUTPATIENT)
Dept: ORTHOPEDIC SURGERY | Age: 60
End: 2025-04-22
Payer: COMMERCIAL

## 2025-04-22 VITALS — WEIGHT: 236 LBS | BODY MASS INDEX: 33.79 KG/M2 | HEIGHT: 70 IN | RESPIRATION RATE: 14 BRPM

## 2025-04-22 DIAGNOSIS — M54.50 LUMBAR PAIN: Primary | ICD-10-CM

## 2025-04-22 DIAGNOSIS — M48.062 LUMBAR STENOSIS WITH NEUROGENIC CLAUDICATION: ICD-10-CM

## 2025-04-22 DIAGNOSIS — G61.0 GUILLAIN BARRÉ SYNDROME: ICD-10-CM

## 2025-04-22 PROCEDURE — 99213 OFFICE O/P EST LOW 20 MIN: CPT | Performed by: ORTHOPAEDIC SURGERY

## 2025-05-01 ENCOUNTER — HOSPITAL ENCOUNTER (OUTPATIENT)
Dept: PHYSICAL THERAPY | Age: 60
Setting detail: THERAPIES SERIES
Discharge: HOME | End: 2025-05-01
Attending: ORTHOPAEDIC SURGERY
Payer: COMMERCIAL

## 2025-05-01 PROCEDURE — 97162 PT EVAL MOD COMPLEX 30 MIN: CPT

## 2025-05-01 NOTE — CONSULTS
strength.   [x] ? Function: Patient reports 48% functionally impaired on Modified Oswestry. Patient requires moderate assistance x2 for transfers.   [x] Other: muscle tightness, functional mobility (I.e. transfers, gait)    STG: (to be met in 10 treatments)  Pt will self report worst pain no greater than 4/10 in order to better tolerate ADLs/work activities with minimal dysfunction  Pt will improve lumbar AROM to WFL in order to demonstrate ability to move/reach in all planes unrestricted at PLOF  Patient will demonstrate understanding and implementation of fall preventions in order to reduce patient's risk for falls.   LTG: (to be met in 24 treatments)  Pt will demonstrate improved B LE strength to 4+/5 or greater in order to demonstrate improved stability/strength necessary for unrestricted ADLs/work activities  Pt will decrease score on Modified Oswestry from 48% functionally impaired to less than 20% functionally impaired in order to demonstrate improved functional tolerances at PLOF with minimal restriction/dysfunction  Patient will report ability to stand for 30 minutes without increased lower back pain to improve function for standing tasks.   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 Education:  [x] Plans/Goals, Risks/Benefits discussed  [] Home exercise program  Method of Education:

## 2025-05-02 ENCOUNTER — PROCEDURE VISIT (OUTPATIENT)
Dept: PHYSICAL MEDICINE AND REHAB | Age: 60
End: 2025-05-02

## 2025-05-02 DIAGNOSIS — G63 POLYNEUROPATHY ASSOCIATED WITH UNDERLYING DISEASE: Primary | ICD-10-CM

## 2025-05-02 DIAGNOSIS — G61.0 GUILLAIN BARRÉ SYNDROME: ICD-10-CM

## 2025-05-02 DIAGNOSIS — M48.062 LUMBAR STENOSIS WITH NEUROGENIC CLAUDICATION: ICD-10-CM

## 2025-05-03 ENCOUNTER — HOSPITAL ENCOUNTER (EMERGENCY)
Age: 60
Discharge: HOME | End: 2025-05-03
Attending: STUDENT IN AN ORGANIZED HEALTH CARE EDUCATION/TRAINING PROGRAM
Payer: COMMERCIAL

## 2025-05-03 VITALS
TEMPERATURE: 97.9 F | SYSTOLIC BLOOD PRESSURE: 123 MMHG | OXYGEN SATURATION: 96 % | RESPIRATION RATE: 18 BRPM | HEIGHT: 70 IN | DIASTOLIC BLOOD PRESSURE: 67 MMHG | WEIGHT: 235 LBS | BODY MASS INDEX: 33.64 KG/M2 | HEART RATE: 88 BPM

## 2025-05-03 DIAGNOSIS — L40.9 PSORIASIS: Primary | ICD-10-CM

## 2025-05-03 PROCEDURE — 6370000000 HC RX 637 (ALT 250 FOR IP): Performed by: STUDENT IN AN ORGANIZED HEALTH CARE EDUCATION/TRAINING PROGRAM

## 2025-05-03 PROCEDURE — 99283 EMERGENCY DEPT VISIT LOW MDM: CPT

## 2025-05-03 RX ORDER — CLOBETASOL PROPIONATE 0.5 MG/ML
LOTION TOPICAL
Qty: 118 ML | Refills: 0 | Status: SHIPPED | OUTPATIENT
Start: 2025-05-03

## 2025-05-03 RX ORDER — HYDROXYZINE HYDROCHLORIDE 25 MG/1
25 TABLET, FILM COATED ORAL ONCE
Status: COMPLETED | OUTPATIENT
Start: 2025-05-03 | End: 2025-05-03

## 2025-05-03 RX ORDER — HYDROXYZINE HYDROCHLORIDE 25 MG/1
25 TABLET, FILM COATED ORAL EVERY 8 HOURS PRN
Qty: 30 TABLET | Refills: 0 | Status: SHIPPED | OUTPATIENT
Start: 2025-05-03 | End: 2025-05-13

## 2025-05-03 RX ADMIN — HYDROXYZINE HYDROCHLORIDE 25 MG: 25 TABLET, FILM COATED ORAL at 20:07

## 2025-05-03 ASSESSMENT — PAIN DESCRIPTION - ORIENTATION: ORIENTATION: LEFT;RIGHT

## 2025-05-03 ASSESSMENT — PAIN DESCRIPTION - LOCATION: LOCATION: HAND;LEG

## 2025-05-03 ASSESSMENT — ENCOUNTER SYMPTOMS
SHORTNESS OF BREATH: 0
ABDOMINAL PAIN: 0

## 2025-05-03 ASSESSMENT — PAIN SCALES - GENERAL: PAINLEVEL_OUTOF10: 6

## 2025-05-03 ASSESSMENT — PAIN - FUNCTIONAL ASSESSMENT: PAIN_FUNCTIONAL_ASSESSMENT: 0-10

## 2025-05-03 NOTE — ED PROVIDER NOTES
[facility]  Emergency Department Encounter  Emergency Medicine Physician     Pt Name: Bandar Willard  MRN: [id number]  Birthdate 1965  Date of evaluation: 5/3/25  PCP:  Elif Ortez APRN - CNP    CHIEF COMPLAINT       Chief Complaint   Patient presents with    Skin Problem    Rash       HISTORY OF PRESENT ILLNESS  (Location/Symptom, Timing/Onset, Context/Setting, Quality, Duration, Modifying Factors, Severity.)    Bandar Willard is a 59 y.o. male who presents with 2 hands, elbows, back.  Worsening over the past 3 days.  States he has a history of psoriasis.  Was recently admitted to the hospital actually for Guillain-Barré syndrome and treated with IVIG.  Also had a urinary tract infection for which she was treated with ceftriaxone and discharged on Macrobid.  Finished his antibiotic course.  He states that his hand started to peel about 2 days ago.  Started out as an itchy rash and then began to peel.  States this skin on his back and chest feels similar to psoriasis but he has not had issues with his hands before.  He denies any fevers or chills.  Denies any involvement in his eyes, nose, mouth, lips, around his genital area.  He denies any fevers or chills.  He states he feels that he is recovering well from Guillain-Barré syndrome        PAST MEDICAL / SURGICAL / SOCIAL / FAMILY HISTORY    has a past medical history of Chronic cellulitis, Diabetes mellitus (HCC), Hyperlipidemia, and Hypertension.     has no past surgical history on file.    Family History   Problem Relation Age of Onset    Diabetes Mother     No Known Problems Father        Allergies:    Patient has no known allergies.    Home Medications:  Prior to Admission medications    Medication Sig Start Date End Date Taking? Authorizing Provider   clobetasol propionate 0.05 % LOTN lotion Apply twice daily to affected areas for up to 2 weeks 5/3/25  Yes Alexey Tellez,    hydrOXYzine HCl (ATARAX) 25 MG tablet Take 1 tablet  discussed in length with the patient/patient representative. The patient/patient representative understands that at this time there is no evidence for a more malignant underlying process, but also understands that early in the process of an illness or injury, an emergency department work-up can be falsely reassuring. Patient/patient representative had no further questions prior to being discharged and understands that worsening, changing or persistent symptoms should prompt a immediate call or follow-up with their primary care physician or return to the emergency department. Any changes to existing medications or new prescriptions were reviewed with patient/patient representative and they expressed understanding of how to correctly take their medications and the possible side effects. I have reviewed the disposition diagnosis with the patient/patient representative.  I have answered their questions and given discharge instructions.  They voiced understanding of these instructions and did not have any further questions or complaints. They were updated on all incidental findings.      PATIENT REFERRED TO:  Erinn Pinto MD  [address] [facility] [address]  [phone]          Elif Ortez, APRN - CNP  [address]  [facility] [address] [phone]            DISCHARGE MEDICATIONS:  New Prescriptions    CLOBETASOL PROPIONATE 0.05 % LOTN LOTION    Apply twice daily to affected areas for up to 2 weeks    HYDROXYZINE HCL (ATARAX) 25 MG TABLET    Take 1 tablet by mouth every 8 hours as needed for Itching       Alexey Tellez DO  Emergency Medicine Attending    (Please note that portions of this note were completed with a voice recognition program.  Efforts were made to edit the dictations but occasionally words are mis-transcribed.)          Alexey Tellez DO  05/03/25 2037

## 2025-05-03 NOTE — DISCHARGE INSTRUCTIONS
Please follow-up with your primary care provider and the dermatologist  Please use the clobetasol up to twice a day as prescribed  May take hydroxyzine as prescribed for itchiness please note this medication will make you sleepy    If you have any worsening symptoms at all, please go directly to the [facility]'s emergency department

## 2025-05-05 NOTE — PROGRESS NOTES
Future Appointments   Date Time Provider Department Center   5/6/2025  3:30 PM Turner Rojas MD [facility]   5/8/2025  9:00 AM Bryant Lopez PTA [facility]   5/9/2025  8:30 AM Bryant Lopez PTA [facility]   5/14/2025  2:00 PM Ashley Garibay PTA [facility]   5/15/2025 10:30 AM Drew Bills PA-C [facility]   5/16/2025 10:00 AM Bryant Lopez PTA [facility]

## 2025-05-06 ENCOUNTER — OFFICE VISIT (OUTPATIENT)
Dept: ORTHOPEDIC SURGERY | Age: 60
End: 2025-05-06

## 2025-05-06 VITALS — RESPIRATION RATE: 14 BRPM | WEIGHT: 235 LBS | HEIGHT: 70 IN | BODY MASS INDEX: 33.64 KG/M2

## 2025-05-06 DIAGNOSIS — G61.0 GUILLAIN BARRÉ SYNDROME: ICD-10-CM

## 2025-05-06 DIAGNOSIS — M48.062 LUMBAR STENOSIS WITH NEUROGENIC CLAUDICATION: Primary | ICD-10-CM

## 2025-05-06 NOTE — PROGRESS NOTES
motion and neck supple.   Pulmonary:      Effort: Pulmonary effort is normal. No respiratory distress.   Musculoskeletal:      Comments: Normal gait     Skin:     General: Skin is warm and dry.   Neurological:      Mental Status: Alert and oriented to person, place, and time.      Sensory: No sensory deficit.   Psychiatric:         Behavior: Behavior normal.         Thought Content: Thought content normal.    Scribe Attestation     By signing my name below, IMarlon, attest that this documentation has been prepared under the direction and in the presence of Dr. Turner Rojas. Electronically signed: Kwame Munoz     Physician Attestation    Turner RAMIREZ, personally performed the services described in this documentation. All medical record entries made by the scribe were at my direction and in my presence. I have reviewed the chart and discharge instructions and agree that the records reflect my personal performance and is accurate and complete. Turner Rojas MD 5/6/25           Please note that this chart was generated using voice recognition Dragon dictation software.  Although every effort was made to ensure the accuracy of this automated transcription, some errors in transcription may have occurred.

## 2025-05-08 ENCOUNTER — HOSPITAL ENCOUNTER (OUTPATIENT)
Dept: PHYSICAL THERAPY | Age: 60
Setting detail: THERAPIES SERIES
Discharge: HOME | End: 2025-05-08
Attending: ORTHOPAEDIC SURGERY
Payer: COMMERCIAL

## 2025-05-08 PROCEDURE — 97113 AQUATIC THERAPY/EXERCISES: CPT

## 2025-05-08 NOTE — FLOWSHEET NOTE
[facility]   Outpatient Rehabilitation & Therapy  [address]  P: [phone]   F: [phone]    Physical Therapy Daily Treatment Note    Date:  2025  Patient Name:  Bandar Willard    :  1965  MRN: [id number]  Physician: Turner Rojas MD                                  Insurance: [email or web address] (CPT codes verified: 76006, 11130, 11115, 11355, 45758, 75719, 14458, 92527, 52940, 40 visits authorized)     Medical Diagnosis: M54.50 (ICD-10-CM) - Lumbar pain      Rehab Codes: M54.50 (ICD-10-CM) - Lumbar pain, R53.1 - generalized weakness, R26.2 - difficulty walking   Onset Date: 2025                      Next 's appt: TBD  Visit# / total visits:  Cancels/No Shows: 0/0    Subjective:  Patient reports back pain is manageable at this time.  States LEs are very weak and has neuropathy from (B) knees down.  RLE tends to be weaker and has more difficulty controlling.  Notes falling over the weekend due to LEs giving out and falling with LEs underneath patient- squad was called to help patient back to feet but did not go to the hospital.     Pain:  [x] Yes  [] No Location: Low back    Pain Rating: (0-10 scale) 2/10  Pain altered Tx:  [x] No  [] Yes  Action:  Comments: Initial aquatic therapy visit.  Educated on postural awareness, core stability and working in pain free ranges with all exercises.      Objective:    [facility] Services Exercise Log  Aquatic, Hip & DLS Program- Phase 1    Date of Eval: 25                               Primary PT: Ashley Sharp, PT  Precautions: Recently dx with Guillain [place] Syndrome while admitted to hospital 4/10/25-4/15/25, fall risk, requires second assist for transfers        Uses chair lift to enter pool.  X2 transfer for safety.  1 on 1 in the pool for first couple visits  Date 25       Visit # 2       Walk F/L/R 2 Laps @ Rail   1 Lap Lat       Marching 10x       Squats 10x       Step-Ups F/L        Step Down

## 2025-05-09 ENCOUNTER — HOSPITAL ENCOUNTER (OUTPATIENT)
Dept: PHYSICAL THERAPY | Age: 60
Setting detail: THERAPIES SERIES
Discharge: HOME | End: 2025-05-09
Attending: ORTHOPAEDIC SURGERY
Payer: COMMERCIAL

## 2025-05-09 PROCEDURE — 97113 AQUATIC THERAPY/EXERCISES: CPT

## 2025-05-09 NOTE — FLOWSHEET NOTE
[facility]   Outpatient Rehabilitation & Therapy  [address]  P: [phone]   F: [phone]    Physical Therapy Daily Treatment Note    Date:  2025  Patient Name:  Bandar Willard    :  1965  MRN: [id number]  Physician: Turner Rojas MD                                  Insurance: [employer] (CPT codes verified: 96536, 08085, 21549, 59462, 35839, 00924, 85102, 98392, 79533, 40 visits authorized)     Medical Diagnosis: M54.50 (ICD-10-CM) - Lumbar pain      Rehab Codes: M54.50 (ICD-10-CM) - Lumbar pain, R53.1 - generalized weakness, R26.2 - difficulty walking   Onset Date: 2025                      Next 's appt: TBD  Visit# / total visits:  Cancels/No Shows: 0/0    Subjective:  Patient reports increased soreness all over but mostly in LEs after initial aquatic therapy visit.  Reports low back and gluts burned due to fatigue and took a few hour nap due to overall fatigue after initial visit.  Reports low back is sore but tolerable at this time.  Numbness in left foot this morning upon arrival.    Pain:  [x] Yes  [] No Location: Low back, Neuropathy (B) knees down worse on right.    Pain Rating: (0-10 scale) 4/10  Pain altered Tx:  [x] No  [] Yes  Action:  Comments: Reviewed postural awareness, core stability and working in pain free ranges with all exercises.      Objective:    [facility] Services Exercise Log  Aquatic, Hip & DLS Program- Phase 1    Date of Eval: 25                               Primary PT: Ashley Sharp, AUDI  Precautions: Recently dx with Guillain [place]ick[place] Syndrome while admitted to hospital 4/10/25-4/15/25, fall risk, requires second assist for transfers        Uses chair lift to enter pool.  x2 transfer for safety MOD A.  1 on 1 in the pool for first couple visits.  May consider not needing A in pool next visit.   Date 25      Visit # 2/24 3/24      Walk F/L/R 2 Laps @ Rail   1 Lap Lat 2 Laps @ Rail + 1 lap R

## 2025-05-13 ENCOUNTER — OFFICE VISIT (OUTPATIENT)
Dept: ORTHOPEDIC SURGERY | Age: 60
End: 2025-05-13
Payer: COMMERCIAL

## 2025-05-13 VITALS — RESPIRATION RATE: 14 BRPM | HEIGHT: 70 IN | BODY MASS INDEX: 33.64 KG/M2 | WEIGHT: 235 LBS

## 2025-05-13 DIAGNOSIS — G61.0 GUILLAIN BARRÉ SYNDROME: ICD-10-CM

## 2025-05-13 DIAGNOSIS — M48.062 LUMBAR STENOSIS WITH NEUROGENIC CLAUDICATION: Primary | ICD-10-CM

## 2025-05-13 DIAGNOSIS — E11.42 DIABETIC POLYNEUROPATHY ASSOCIATED WITH TYPE 2 DIABETES MELLITUS (HCC): ICD-10-CM

## 2025-05-13 DIAGNOSIS — R29.898 WEAKNESS OF BOTH LOWER EXTREMITIES: ICD-10-CM

## 2025-05-13 PROCEDURE — 3046F HEMOGLOBIN A1C LEVEL >9.0%: CPT | Performed by: ORTHOPAEDIC SURGERY

## 2025-05-13 PROCEDURE — 99213 OFFICE O/P EST LOW 20 MIN: CPT | Performed by: ORTHOPAEDIC SURGERY

## 2025-05-13 NOTE — PROGRESS NOTES
Patient ID: Bandar Willard is a 59 y.o. male    Chief Compliant:  No chief complaint on file.       Diagnostic imaging:    EMGs patient with horrific diabetic neuropathy so bad that unable to tell if patient has Guillain-Barré or radiculopathy    Last hemoglobin A1c was over 10    Assessment and Plan:  1. Lumbar stenosis with neurogenic claudication    2. Guillain Barré syndrome    3. Weakness of both lower extremities    4. Diabetic polyneuropathy associated with type 2 diabetes mellitus (HCC)      Patient candidate for L3 to sacrum decompression however at this time is medically unfit for surgical intervention    Fortunately patient reports he is showing some improvement      I discussed that due to his very elevated A1C values, he is unfit as a candidate for surgery at this time. I advised him to follow with his medical doctor to lower his levels and get his sugars in control, in order to consider a procedure.    Patient meeting with primary care physician tomorrow      Follow up in 4 weeks    HPI:  This is a 59 y.o. male who presents to the clinic today for follow up of low back pain..     Ongoing low back pain with bilateral paresthesia in the legs.      He reports the aquatic PT has provided some relief of symptoms.    Patient presents today in a wheelchair.    Review of Systems   All other systems reviewed and are negative.      Past History:    Current Outpatient Medications:     clobetasol propionate 0.05 % LOTN lotion, Apply twice daily to affected areas for up to 2 weeks, Disp: 118 mL, Rfl: 0    hydrOXYzine HCl (ATARAX) 25 MG tablet, Take 1 tablet by mouth every 8 hours as needed for Itching, Disp: 30 tablet, Rfl: 0    DULoxetine (CYMBALTA) 60 MG extended release capsule, Take 1 capsule by mouth daily, Disp: , Rfl:     empagliflozin (JARDIANCE) 25 MG tablet, Take 1 tablet by mouth daily, Disp: , Rfl:     gabapentin (NEURONTIN) 400 MG capsule, Take 2 capsules by mouth 3 times daily., Disp: , Rfl:

## 2025-05-14 ENCOUNTER — HOSPITAL ENCOUNTER (OUTPATIENT)
Dept: PHYSICAL THERAPY | Age: 60
Setting detail: THERAPIES SERIES
Discharge: HOME | End: 2025-05-14
Attending: ORTHOPAEDIC SURGERY
Payer: COMMERCIAL

## 2025-05-14 PROCEDURE — 97113 AQUATIC THERAPY/EXERCISES: CPT

## 2025-05-14 NOTE — FLOWSHEET NOTE
[facility]   Outpatient Rehabilitation & Therapy  [address]  P: [phone]   F: [phone]    Physical Therapy Daily Treatment Note    Date:  2025  Patient Name:  Bandar Willard    :  1965  MRN: [id number]  Physician: Turner Rojas MD                                  Insurance: [facility] (CPT codes verified: 62984, 17425, 15389, 68527, 19816, 69948, 72058, 42339, 77884, 40 visits authorized)     Medical Diagnosis: M54.50 (ICD-10-CM) - Lumbar pain      Rehab Codes: M54.50 (ICD-10-CM) - Lumbar pain, R53.1 - generalized weakness, R26.2 - difficulty walking   Onset Date: 2025                      Next 's appt: TBD  Visit# / total visits:  Cancels/No Shows: 0/0    Subjective:   Patient arrived and states he feels fatigued today. States he usually is sore after therapy session. Does feel like he slowly is improving in function and strength since start of aquatic therapy.  Pain:  [x] Yes  [] No Location: Low back, Neuropathy (B) knees down worse on right.    Pain Rating: (0-10 scale) 4/10  Pain altered Tx:  [x] No  [] Yes  Action:  Comments: Reviewed postural awareness, core stability and working in pain free ranges with all exercises.      Objective:    [facility] Services Exercise Log  Aquatic, Hip & DLS Program- Phase 1    Date of Eval: 25                               Primary PT: Ashley Sharp, PT  Precautions: Recently dx with Guillain [place] Syndrome while admitted to hospital 4/10/25-4/15/25, fall risk, requires second assist for transfers        Uses chair lift to enter pool.  x2 transfer for safety MOD A.  1 on 1 in the pool for first couple visits.  May consider not needing A in pool next visit.   Date 25     Visit # 2/24 3/24 4/24     Walk F/L/R 2 Laps @ Rail   1 Lap Lat 2 Laps @ Rail + 1 lap R 2 laps @ rail      Marching 10x 10x 10x2     Squats 10x 10x 10x     Step-Ups F/L        Step Down F/L        Heel-toe

## 2025-05-15 ENCOUNTER — OFFICE VISIT (OUTPATIENT)
Age: 60
End: 2025-05-15
Payer: COMMERCIAL

## 2025-05-15 VITALS
HEART RATE: 84 BPM | OXYGEN SATURATION: 95 % | DIASTOLIC BLOOD PRESSURE: 70 MMHG | TEMPERATURE: 99.1 F | HEIGHT: 70 IN | WEIGHT: 235 LBS | BODY MASS INDEX: 33.64 KG/M2 | SYSTOLIC BLOOD PRESSURE: 109 MMHG

## 2025-05-15 DIAGNOSIS — I87.2 STASIS DERMATITIS OF BOTH LEGS: ICD-10-CM

## 2025-05-15 DIAGNOSIS — L40.9 PSORIASIS: Primary | ICD-10-CM

## 2025-05-15 PROCEDURE — 3074F SYST BP LT 130 MM HG: CPT | Performed by: PHYSICIAN ASSISTANT

## 2025-05-15 PROCEDURE — 99204 OFFICE O/P NEW MOD 45 MIN: CPT | Performed by: PHYSICIAN ASSISTANT

## 2025-05-15 PROCEDURE — 3078F DIAST BP <80 MM HG: CPT | Performed by: PHYSICIAN ASSISTANT

## 2025-05-15 RX ORDER — AMMONIUM LACTATE 12 G/100G
CREAM TOPICAL
Qty: 385 G | Refills: 4 | Status: SHIPPED | OUTPATIENT
Start: 2025-05-15 | End: 2025-06-14

## 2025-05-15 RX ORDER — CLOBETASOL PROPIONATE 0.5 MG/G
CREAM TOPICAL
Qty: 60 G | Refills: 2 | Status: SHIPPED | OUTPATIENT
Start: 2025-05-15

## 2025-05-15 NOTE — PROGRESS NOTES
Dermatology Patient Note  [facility] PHYSICIANS [facility]  [facility] DERMATOLOGY  [address] [place] [address]  Dept: [phone]  Dept Fax: [phone]      VISITDATE: 5/15/2025   REFERRING PROVIDER: No ref. provider found      Bandar Willard is a 59 y.o. male  who presents today in the office for:    New Patient (Patient presents in the office as a new patient after being in the hospital. He went for psoriasis on his hands. He was diagnosed with this when he was a child. His hands were cracking and peeling. This is not painful. He does not use any prescribed medications for this normally but was given clobetasol lotion by the hospital this time and he states this has helped. )      HISTORY OF PRESENT ILLNESS:  As above.  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    MEDICAL PROBLEMS:  Patient Active Problem List    Diagnosis Date Noted    CSF abnormal 04/12/2025    Falls 04/11/2025    Diabetic neuropathy (HCC) 04/11/2025    Spinal stenosis in cervical region 04/11/2025    Spinal stenosis, lumbar region, without neurogenic claudication 04/11/2025    Bilateral leg weakness 04/10/2025    Obstructive sleep apnea on CPAP 03/06/2020    Cellulitis 03/05/2020    Hyperlipidemia 03/05/2020    Mixed hyperlipidemia 03/05/2020    Essential hypertension 03/05/2020    Type 2 diabetes mellitus with hyperglycemia (HCC) 03/05/2020    Class 3 severe obesity with serious comorbidity and body mass index (BMI) of 45.0 to 49.9 in adult (HCC) 03/05/2020       CURRENT MEDICATIONS:   Current Outpatient Medications   Medication Sig Dispense Refill    DULoxetine (CYMBALTA) 60 MG extended release capsule Take 1 capsule by mouth daily      empagliflozin (JARDIANCE) 25 MG tablet Take 1 tablet by mouth daily      gabapentin (NEURONTIN) 400 MG capsule Take 2 capsules by mouth 3 times daily.      oxyBUTYnin (DITROPAN-XL) 10 MG extended release tablet Take 1 tablet by mouth

## 2025-05-16 ENCOUNTER — HOSPITAL ENCOUNTER (OUTPATIENT)
Dept: PHYSICAL THERAPY | Age: 60
Setting detail: THERAPIES SERIES
Discharge: HOME | End: 2025-05-16
Attending: ORTHOPAEDIC SURGERY
Payer: COMMERCIAL

## 2025-05-16 NOTE — FLOWSHEET NOTE
[facility]   Outpatient Rehabilitation & Therapy  [address] [facility]  P: [phone]   F: [phone]     Physical Therapy Cancel/No Show note    Date: 2025  Patient: Bandar Willard  : 1965  MRN: [id number]    Visit Count:   Cancels/No Shows to date:     For today's appointment patient:    [x]  Cancelled    [] Rescheduled appointment    [] No-show     Reason given by patient:    [x]  Patient ill    []  Conflicting appointment    [] No transportation      [] Conflict with work    [] No reason given    [] Weather related    [] COVID-19    [] Other:      Comments:        [x] Next appointment was confirmed    Electronically signed by: Bryant Lopez PTA

## 2025-05-20 ENCOUNTER — APPOINTMENT (OUTPATIENT)
Dept: PHYSICAL THERAPY | Age: 60
End: 2025-05-20
Attending: ORTHOPAEDIC SURGERY
Payer: COMMERCIAL

## 2025-05-21 ENCOUNTER — HOSPITAL ENCOUNTER (OUTPATIENT)
Dept: PHYSICAL THERAPY | Age: 60
Setting detail: THERAPIES SERIES
Discharge: HOME | End: 2025-05-21
Attending: ORTHOPAEDIC SURGERY
Payer: COMMERCIAL

## 2025-05-21 PROCEDURE — 97113 AQUATIC THERAPY/EXERCISES: CPT

## 2025-05-23 ENCOUNTER — HOSPITAL ENCOUNTER (OUTPATIENT)
Dept: PHYSICAL THERAPY | Age: 60
Setting detail: THERAPIES SERIES
Discharge: HOME | End: 2025-05-23
Attending: ORTHOPAEDIC SURGERY
Payer: COMMERCIAL

## 2025-05-23 PROCEDURE — 97113 AQUATIC THERAPY/EXERCISES: CPT

## 2025-05-23 NOTE — FLOWSHEET NOTE
worst pain no greater than 4/10 in order to better tolerate ADLs/work activities with minimal dysfunction  Pt will improve lumbar AROM to WFL in order to demonstrate ability to move/reach in all planes unrestricted at PLOF  Patient will demonstrate understanding and implementation of fall preventions in order to reduce patient's risk for falls.   LTG: (to be met in 24 treatments)  Pt will demonstrate improved B LE strength to 4+/5 or greater in order to demonstrate improved stability/strength necessary for unrestricted ADLs/work activities  Pt will decrease score on Modified Oswestry from 48% functionally impaired to less than 20% functionally impaired in order to demonstrate improved functional tolerances at PLOF with minimal restriction/dysfunction  Patient will report ability to stand for 30 minutes without increased lower back pain to improve function for standing tasks.   Pt will demonstrate independence with a long term HEP for continued progress/maintenance after completion of PT    Pt. Education:  [x] Yes  [] No  [x] Reviewed Prior HEP/Ed- Safety with transfers, proper technique with exercises throughout session.     Method of Education: [x] Verbal  [x] Demo  [] Written  Comprehension of Education:  [x] Verbalizes understanding.  [x] Demonstrates understanding.  [] Needs review.  [] Demonstrates/verbalizes HEP/Ed previously given.       Plan: [x] Continue per plan of care.   [] Other:      Treatment Charges: Mins Units Time in/Out   []  Modalities      []  Ther Exercise      []  Manual Therapy      []  Ther Activities      [x]  Aquatics 40 3    []  Neuromuscular      [] Vasocompression      [] Gait Training      [] Dry needling        [] 1 or 2 muscles        [] 3 or more muscles      []  Other      Total Billable time 40 3      Time In: 0950             Time Out: 1030    Electronically signed by:  Bryant Lopez PTA

## 2025-05-27 ENCOUNTER — HOSPITAL ENCOUNTER (OUTPATIENT)
Dept: PHYSICAL THERAPY | Age: 60
Setting detail: THERAPIES SERIES
Discharge: HOME | End: 2025-05-27
Attending: ORTHOPAEDIC SURGERY
Payer: COMMERCIAL

## 2025-05-27 PROCEDURE — 97113 AQUATIC THERAPY/EXERCISES: CPT

## 2025-05-27 NOTE — FLOWSHEET NOTE
[facility]   Outpatient Rehabilitation & Therapy  [address]  P: [phone]   F: [phone]    Physical Therapy Daily Treatment Note    Date:  2025  Patient Name:  Bandar Willard    :  1965  MRN: [id number]  Physician: Turner Rojas MD                                  Insurance: [employer] (CPT codes verified: 87306, 56285, 87984, 81819, 45753, 36516, 31923, 54504, 94474, 40 visits authorized)     Medical Diagnosis: M54.50 (ICD-10-CM) - Lumbar pain      Rehab Codes: M54.50 (ICD-10-CM) - Lumbar pain, R53.1 - generalized weakness, R26.2 - difficulty walking   Onset Date: 2025                      Next 's appt: TBD  Visit# / total visits:  Cancels/No Shows: 1/0    Subjective:  Patient reports having a good morning so far.  Notes low back hurting the most.  States was able to stand and pull pants up in the bathroom without help.  Reports feeling stronger and more confident by the day.  Notes fatigue and mild soreness after last visit.    Pain:  [x] Yes  [] No Location: Low back, Neuropathy (B) knees down worse on right.    Pain Rating: (0-10 scale) 3/10  Pain altered Tx:  [x] No  [] Yes  Action:  Comments: Reviewed postural awareness, core stability and working in pain free ranges with all exercises.      Objective:    [facility] Services Exercise Log  Aquatic, Hip & DLS Program- Phase 1    Date of Eval: 25                               Primary PT: Ashley Sharp, PT  Precautions: Recently dx with Guillain Melville Syndrome while admitted to hospital 4/10/25-4/15/25, fall risk, requires second assist for transfers      Uses chair lift to enter pool.  x2 transfer for safety MOD A.  1 on 1 in the pool for first couple visits.  May consider not needing A in pool next visit.   Date 25   Visit #    Walk F/L/R 2 laps @ rail  2 Laps @ Rail 2 LAps @ Rail 2 Laps @ Rail   Marching 10x2 12x 12x 12x   Squats

## 2025-05-30 ENCOUNTER — HOSPITAL ENCOUNTER (OUTPATIENT)
Dept: PHYSICAL THERAPY | Age: 60
Setting detail: THERAPIES SERIES
Discharge: HOME | End: 2025-05-30
Attending: ORTHOPAEDIC SURGERY
Payer: COMMERCIAL

## 2025-05-30 PROCEDURE — 97113 AQUATIC THERAPY/EXERCISES: CPT

## 2025-05-30 NOTE — FLOWSHEET NOTE
[facility]   Outpatient Rehabilitation & Therapy  [address]  P: [phone]   F: [phone]    Physical Therapy Daily Treatment Note    Date:  2025  Patient Name:  Bandar Willard    :  1965  MRN: [id number]  Physician: Turner Rojas MD                                  Insurance: [employer] (CPT codes verified: 68097, 06558, 52757, 62045, 95096, 37914, 86547, 01782, 46690, 40 visits authorized)     Medical Diagnosis: M54.50 (ICD-10-CM) - Lumbar pain      Rehab Codes: M54.50 (ICD-10-CM) - Lumbar pain, R53.1 - generalized weakness, R26.2 - difficulty walking   Onset Date: 2025                      Next 's appt: TBD  Visit# / total visits:  Cancels/No Shows: 1/0    Subjective:  Patient continues to report great ease with transfer, bathroom activities and self care.  Also noting improved balance un support but still for minimal amount of time.    Pain:  [x] Yes  [] No Location: Low back, Neuropathy (B) knees down worse on right.    Pain Rating: (0-10 scale) 2/10  Pain altered Tx:  [x] No  [] Yes  Action:  Comments: Reviewed postural awareness, core stability and working in pain free ranges with all exercises.      Objective:    [facility] Services Exercise Log  Aquatic, Hip & DLS Program- Phase 1    Date of Eval: 25                               Primary PT: Ashley Sharp, PT  Precautions: Recently dx with Guillain Wishek Syndrome while admitted to hospital 4/10/25-4/15/25, fall risk, requires second assist for transfers      Uses chair lift to enter pool.  x2 transfer for safety MOD A.  1 on 1 in the pool for first couple visits.  May consider not needing A in pool next visit.   Date 25   Visit #    Walk F/L/R 2 laps @ rail  2 Laps @ Rail 2 LAps @ Rail 2 Laps @ Rail 2 Laps @ Rail   Marching 10x2 12x 12x 12x 15x   Squats 10x 12x 12x 12x 12x   Step-Ups F/L  Low 5x For Lwo 10x F Low 10x

## 2025-06-03 ENCOUNTER — HOSPITAL ENCOUNTER (OUTPATIENT)
Dept: PHYSICAL THERAPY | Age: 60
Setting detail: THERAPIES SERIES
Discharge: HOME | End: 2025-06-03
Attending: ORTHOPAEDIC SURGERY
Payer: COMMERCIAL

## 2025-06-03 PROCEDURE — 97113 AQUATIC THERAPY/EXERCISES: CPT

## 2025-06-03 NOTE — FLOWSHEET NOTE
[facility]   Outpatient Rehabilitation & Therapy  [address]  P: [phone]   F: [phone]    Physical Therapy Daily Treatment Note    Date:  6/3/2025  Patient Name:  Bandar Willard    :  1965  MRN: [id number]  Physician: Turner Rojas MD                                  Insurance: [employer] (CPT codes verified: 38222, 56198, 68312, 79854, 64913, 44814, 49758, 03085, 96454, 40 visits authorized)     Medical Diagnosis: M54.50 (ICD-10-CM) - Lumbar pain      Rehab Codes: M54.50 (ICD-10-CM) - Lumbar pain, R53.1 - generalized weakness, R26.2 - difficulty walking   Onset Date: 2025                      Next 's appt: TBD  Visit# / total visits:  Cancels/No Shows: 1/0    Subjective:  Patient reports feeling good today upon arrival.  Mild fatigue after last therapy visit but no increased pain.   Pain:  [x] Yes  [] No Location: Low back, Neuropathy (B) knees down worse on right.    Pain Rating: (0-10 scale) 4/10  Pain altered Tx:  [x] No  [] Yes  Action:  Comments: Reviewed postural awareness, core stability and working in pain free ranges with all exercises.      Objective:    [facility] Services Exercise Log  Aquatic, Hip & DLS Program- Phase 1    Date of Eval: 25                               Primary PT: Ashley Sharp, AUDI  Precautions: Recently dx with Guillain Scobey Syndrome while admitted to hospital 4/10/25-4/15/25, fall risk, requires second assist for transfers      Uses chair lift to enter pool.  x2 transfer for safety MOD A.  1 on 1 in the pool for first couple visits.  May consider not needing A in pool next visit.   Date 5/27/25 5/30/25 6/3/25   Visit #    Walk F/L/R 2 Laps @ Rail 2 Laps @ Rail 2 Laps @ RAil   Marching 12x 15x 1 Lap   Squats 12x 12x 12x   Step-Ups F/L Low 10x F +L  Low 10x F+L Low 10x   Step Down F/L      Heel-toe raises 12x 15x 15x   SLR F/L/R 12x 12x 12x   HS curl 12x 12x 12x   Hip/Knee Flex/Ext  10x 12x

## 2025-06-06 ENCOUNTER — HOSPITAL ENCOUNTER (OUTPATIENT)
Dept: PHYSICAL THERAPY | Age: 60
Setting detail: THERAPIES SERIES
Discharge: HOME | End: 2025-06-06
Attending: ORTHOPAEDIC SURGERY
Payer: COMMERCIAL

## 2025-06-06 PROCEDURE — 97113 AQUATIC THERAPY/EXERCISES: CPT

## 2025-06-06 NOTE — FLOWSHEET NOTE
15x 15x 15x   SLR F/L/R 12x 12x 12x 15x   HS curl 12x 12x 12x 15x   Hip/Knee Flex/Ext  10x 12x 15x   F/L Lunges              Kickboard Ex. Small in Corner Small in corner Small in Corner    Iso Abd. 12x 12x5\" 12x5\"    Push-pull 12x 12x 12x    Paddling 12x 12x 12x           UE Format: Standing 40% WB   Standing 40% WB   Horiz Abd/Add 10x   10x   IR/ER (wipers)       Alt Flex/Ext    10x   Alt Press Down       Abd/Add 10x   10x   Bicep curls               Seated in chair        cycling       Jacks (abd/Add)              Deep Water: 1 Noodle 1 Noodle 1 Noodle 1 Noodle   Hang 2' 2' 2' 1'   Cycling 1' 1' 1' 1'   Jacks 1' 1' 1' 1'   X-Country  1' 1' 1'          Balance       SLS       Static stand 2x30\"   2x30\"    Stretches       Achllies       Hamstring    Tall box   20\"x2          Cool Down       Pain Rating  5 6       Specific Instructions for next treatment: Progress UE format next visit.      Assessment: [x] Progressing toward goals.  Improved tolerance to exercises with less pain in low back but still getting burning down into hamstrings when standing/ambulating in 40% WB warm water environment.  Added hamstring stretch to decrease tightness and hopefully decrease pain in hamstring and increased mobility.  Added UE format for trunk/core stability. Min A required with patient tending to fall retroly.  Good tolerance to all deep water exercises but still challenging to maintain core stability throughout deep water exercises- pt's feet tend to float forward.        [] No change.     [] Other:      [x] Patient would continue to benefit from skilled physical therapy services in order to address lumbar AROM, gentle LE strengthening, and stretching without over fatiguing the patient to avoid overexertion of muscles.     STG: (to be met in 10 treatments)  Pt will self report worst pain no greater than 4/10 in order to better tolerate ADLs/work activities with minimal dysfunction  Pt will improve lumbar AROM to WFL in order

## 2025-06-09 ENCOUNTER — HOSPITAL ENCOUNTER (OUTPATIENT)
Dept: PHYSICAL THERAPY | Age: 60
Setting detail: THERAPIES SERIES
Discharge: HOME | End: 2025-06-09
Attending: ORTHOPAEDIC SURGERY
Payer: COMMERCIAL

## 2025-06-09 PROCEDURE — 97110 THERAPEUTIC EXERCISES: CPT

## 2025-06-09 PROCEDURE — 97113 AQUATIC THERAPY/EXERCISES: CPT

## 2025-06-09 NOTE — FLOWSHEET NOTE
demonstrate improved B LE strength to 4+/5 or greater in order to demonstrate improved stability/strength necessary for unrestricted ADLs/work activities  Pt will decrease score on Modified Oswestry from 48% functionally impaired to less than 20% functionally impaired in order to demonstrate improved functional tolerances at PLOF with minimal restriction/dysfunction  Patient will report ability to stand for 30 minutes without increased lower back pain to improve function for standing tasks.   Pt will demonstrate independence with a long term HEP for continued progress/maintenance after completion of PT    Pt. Education:  [x] Yes  [] No  [x] Reviewed Prior HEP/Ed-  6/9 importance of rep count to avoid over exertion   Method of Education: [x] Verbal  [x] Demo  [] Written  Comprehension of Education:  [x] Verbalizes understanding.  [x] Demonstrates understanding.  [] Needs review.  [] Demonstrates/verbalizes HEP/Ed previously given.       Plan: [x] Continue per plan of care.   [] Other:      Treatment Charges: Mins Units Time in/Out   []  Modalities      []  Ther Exercise      []  Manual Therapy      []  Ther Activities      [x]  Aquatics 35 2    []  Neuromuscular      [] Vasocompression      [] Gait Training      [] Dry needling        [] 1 or 2 muscles        [] 3 or more muscles      []  Other      Total Billable time 35 2    Physical therapy treatment completed today in part by physical therapist assistant (PTA).  Treatment times reflect total treatment time combined by both PT and PTA for today's service.    Aquatic Time In: 1000 am             Aquatic Time Out: 1035 am     Electronically signed by:  Ashley Garibay PTA

## 2025-06-09 NOTE — FLOWSHEET NOTE
[facility]   Outpatient Rehabilitation & Therapy  [address]  P: [phone]   F: [phone]    Physical Therapy Daily Treatment Note    Date:  2025  Patient Name:  Bandar Willard    :  1965  MRN: [id number]  Physician: Turner Rojas MD                                  Insurance: [employer] (CPT codes verified: 54296, 28924, 43550, 04530, 54638, 24547, 61602, 50456, 65588, 40 visits authorized)     Medical Diagnosis: M54.50 (ICD-10-CM) - Lumbar pain      Rehab Codes: M54.50 (ICD-10-CM) - Lumbar pain, R53.1 - generalized weakness, R26.2 - difficulty walking   Onset Date: 2025                      Next 's appt: TBD  Visit# / total visits:  Cancels/No Shows: 1/0    Subjective:  Pt reports that transfers have been getting easier since starting therapy. Reports that he is able to go to the bathroom by himself now.    Pain:  [x] Yes  [] No Location: Low back, Neuropathy (B) knees down worse on right.    Pain Rating: (0-10 scale) 5/10  Pain altered Tx:  [x] No  [] Yes  Action:  Comments: reports increased lower back pain coming from aquatic PT session.     Objective:  Evaluation 2025     Range of Motion  Left Range of Motion  Right   Lumbar Flexion Moderately limited     Lumbar Extension Severely limited, pain     Lumbar Rotation Minimally limited Minimally limited   Lumbar Side Bend Moderately limited Severely limited, pain     2025     Range of Motion  Left Range of Motion  Right   Lumbar Flexion Minimally limited, slight pain     Lumbar Extension Moderately limited, pain     Lumbar Rotation Minimally limited, pain Minimally limited, pain   Lumbar Side Bend Moderately limited, pain Moderately limited, pain       Transfers:  Wheelchair to mat: CGA with gait belt utilized, squat pivot transfer with use of B hands  Mat to wheelchair: SBA, squat pivot transfer with use of B hands    Assessment: [x] Progressing toward goals. The patient has progressed towards all of his

## 2025-06-10 ENCOUNTER — OFFICE VISIT (OUTPATIENT)
Dept: ORTHOPEDIC SURGERY | Age: 60
End: 2025-06-10
Payer: COMMERCIAL

## 2025-06-10 ENCOUNTER — TELEPHONE (OUTPATIENT)
Dept: ORTHOPEDIC SURGERY | Age: 60
End: 2025-06-10

## 2025-06-10 VITALS — WEIGHT: 235 LBS | BODY MASS INDEX: 33.64 KG/M2 | RESPIRATION RATE: 14 BRPM | HEIGHT: 70 IN

## 2025-06-10 DIAGNOSIS — M48.062 LUMBAR STENOSIS WITH NEUROGENIC CLAUDICATION: Primary | ICD-10-CM

## 2025-06-10 DIAGNOSIS — R29.898 WEAKNESS OF BOTH LOWER EXTREMITIES: ICD-10-CM

## 2025-06-10 DIAGNOSIS — E11.42 DIABETIC POLYNEUROPATHY ASSOCIATED WITH TYPE 2 DIABETES MELLITUS (HCC): ICD-10-CM

## 2025-06-10 DIAGNOSIS — G61.0 GUILLAIN BARRÉ SYNDROME: ICD-10-CM

## 2025-06-10 PROCEDURE — 99213 OFFICE O/P EST LOW 20 MIN: CPT | Performed by: ORTHOPAEDIC SURGERY

## 2025-06-10 PROCEDURE — 3046F HEMOGLOBIN A1C LEVEL >9.0%: CPT | Performed by: ORTHOPAEDIC SURGERY

## 2025-06-10 NOTE — TELEPHONE ENCOUNTER
Paperwork completed. Paperwork signed and approved by provider.     Paperwork scanned into patients media.     Patient wants to pick paperwork up. Patient aware paperwork is ready to be picked up.

## 2025-06-10 NOTE — PROGRESS NOTES
Patient ID: Bandar Willard is a 59 y.o. male    Chief Compliant:  Chief Complaint   Patient presents with    Back Pain     F/U: Low back pain, radiates down bilateral legs to feet. States he has completed 10 visits of aqua therapy and feels like it is helping, and he feels like he is getting stronger.         Diagnostic imaging:        Assessment and Plan:  1. Lumbar stenosis with neurogenic claudication    2. Guillain Barré syndrome    3. Weakness of both lower extremities    4. Diabetic polyneuropathy associated with type 2 diabetes mellitus (HCC)        Patient's hemoglobin A1c is improved to 9.2      Significant bilateral lower extremity weakness significant lumbar spinal stenosis L3 4 45 and 5 1      Patient never followed up with neurology post being treated for Guillain-Barré in the hospital    Patient remains a limited household ambulator but at least he is doing that and he is feeling a little bit stronger    Continue to work on diabetes    May consider lumbar decompression on follow-up    Follow up 4 weeks    HPI:  This is a 59 y.o. male who presents to the clinic today for previous clinic notes    Patient posthospitalization for weakness bilateral legs inability walk treated for Guillain-Barré also with lumbar spinal stenosis and severe diabetic polyneuropathy.         Review of Systems   All other systems reviewed and are negative.      Past History:    Current Outpatient Medications:     ammonium lactate (AMLACTIN) 12 % cream, Apply to hands twice daily., Disp: 385 g, Rfl: 4    clobetasol (TEMOVATE) 0.05 % cream, Apply to affected areas twice daily, as needed.  Do NOT use on face, groin, or armpits., Disp: 60 g, Rfl: 2    DULoxetine (CYMBALTA) 60 MG extended release capsule, Take 1 capsule by mouth daily, Disp: , Rfl:     empagliflozin (JARDIANCE) 25 MG tablet, Take 1 tablet by mouth daily, Disp: , Rfl:     gabapentin (NEURONTIN) 400 MG capsule, Take 2 capsules by mouth 3 times daily., Disp: , Rfl:

## 2025-06-11 ENCOUNTER — HOSPITAL ENCOUNTER (OUTPATIENT)
Dept: PHYSICAL THERAPY | Age: 60
Setting detail: THERAPIES SERIES
Discharge: HOME | End: 2025-06-11
Attending: ORTHOPAEDIC SURGERY
Payer: COMMERCIAL

## 2025-06-11 PROCEDURE — 97113 AQUATIC THERAPY/EXERCISES: CPT

## 2025-06-11 NOTE — FLOWSHEET NOTE
tolerate ADLs/work activities with minimal dysfunction  PROGRESSING 6/9/25. Pt reports pain at worse is 8/10 compared to 10/10 at eval.   Pt will improve lumbar AROM to WFL in order to demonstrate ability to move/reach in all planes unrestricted at PLOF  PROGRESSING 6/9/25. See ROM measurements above.   Patient will demonstrate understanding and implementation of fall preventions in order to reduce patient's risk for falls.   MET 6/9/25  LTG: (to be met in 24 treatments)  Pt will demonstrate improved B LE strength to 4+/5 or greater in order to demonstrate improved stability/strength necessary for unrestricted ADLs/work activities  Pt will decrease score on Modified Oswestry from 48% functionally impaired to less than 20% functionally impaired in order to demonstrate improved functional tolerances at PLOF with minimal restriction/dysfunction  Patient will report ability to stand for 30 minutes without increased lower back pain to improve function for standing tasks.   Pt will demonstrate independence with a long term HEP for continued progress/maintenance after completion of PT    Pt. Education:  [x] Yes  [] No  [x] Reviewed Prior HEP/Ed-     Method of Education: [x] Verbal  [x] Demo  [] Written  Comprehension of Education:  [x] Verbalizes understanding.  [x] Demonstrates understanding.  [] Needs review.  [] Demonstrates/verbalizes HEP/Ed previously given.       Plan: [x] Continue per plan of care.   [] Other:      Treatment Charges: Mins Units Time in/Out   []  Modalities      []  Ther Exercise      []  Manual Therapy      []  Ther Activities      [x]  Aquatics 35 2    []  Neuromuscular      [] Vasocompression      [] Gait Training      [] Dry needling        [] 1 or 2 muscles        [] 3 or more muscles      []  Other      Total Billable time 35 2        Aquatic Time In: 1128 am             Aquatic Time Out: 1203    Electronically signed by:  Bryant Lopez PTA

## 2025-06-18 ENCOUNTER — HOSPITAL ENCOUNTER (OUTPATIENT)
Dept: PHYSICAL THERAPY | Age: 60
Setting detail: THERAPIES SERIES
Discharge: HOME | End: 2025-06-18
Attending: ORTHOPAEDIC SURGERY
Payer: COMMERCIAL

## 2025-06-18 NOTE — FLOWSHEET NOTE
[facility]   Outpatient Rehabilitation & Therapy  [address] [facility] [address]  P: [phone]   F: [phone]     Physical Therapy Cancel/No Show note    Date: 2025  Patient: Bandar Willard  : 1965  MRN: [id number]    Visit Count:   Cancels/No Shows to date:     For today's appointment patient:    [x]  Cancelled    [] Rescheduled appointment    [] No-show     Reason given by patient:    []  Patient ill    []  Conflicting appointment    [x] No transportation      [] Conflict with work    [] No reason given    [] Weather related    [] COVID-19    [] Other:      Comments:        [x] Next appointment was confirmed    Electronically signed by: Ashley Garibay PTA

## 2025-06-20 ENCOUNTER — HOSPITAL ENCOUNTER (OUTPATIENT)
Dept: PHYSICAL THERAPY | Age: 60
Setting detail: THERAPIES SERIES
Discharge: HOME | End: 2025-06-20
Attending: ORTHOPAEDIC SURGERY
Payer: COMMERCIAL

## 2025-06-20 PROCEDURE — 97113 AQUATIC THERAPY/EXERCISES: CPT

## 2025-06-20 NOTE — FLOWSHEET NOTE
[facility]   Outpatient Rehabilitation & Therapy  [address] [facility] [address]  P: [phone]   F: [phone]    Physical Therapy Daily Treatment Note    Date:  2025  Patient Name:  Bandar Willard    :  1965  MRN: [id number]  Physician: Turner Rojas MD                                  Insurance: [employer] (CPT codes verified: 51771, 49690, 23244, 19112, 35523, 94990, 15493, 84682, 23532, 40 visits authorized)     Medical Diagnosis: M54.50 (ICD-10-CM) - Lumbar pain      Rehab Codes: M54.50 (ICD-10-CM) - Lumbar pain, R53.1 - generalized weakness, R26.2 - difficulty walking   Onset Date: 2025                      Next 's appt: TBD  Visit# / total visits:  Cancels/No Shows: 2/0    Subjective:  Pt reports feeling good today with minimal pain in back or LEs upon arrival.  Had to cancel earlier this week due to unable to get a ride.  Reports pain in back has been improving the past few days.   Pain:  [x] Yes  [] No Location: Low back, Neuropathy (B) knees down worse on right.    Pain Rating: (0-10 scale) 5/10  Pain altered Tx:  [x] No  [] Yes  Action:    Objective:    [facility] Services Exercise Log  Aquatic, Hip & DLS Program- Phase 1    Date of Eval: 25                               Primary PT: Ashley Sharp, PT  Precautions: Recently dx with Guillain Thurmond Syndrome while admitted to hospital 4/10/25-4/15/25, fall risk, requires second assist for transfers      Uses chair lift to enter pool.  x2 transfer for safety MOD A.  1 on 1 in the pool for first couple visits.    Date 5/30/25 6/3/25 6/6/25 6/9/25 6/11/25 6/20/25   Visit # 8/24 9/24 10/24 11/24 12/24 13/24   Walk F/L/R 2 Laps @ Rail 2 Laps @ RAil 2 Laps @ Rail 2 Laps @ Rail 2 Laps @ Rail 2 Laps @ Rail   Marching 15x 1 Lap 1 Lap 1 Lap 1 Lap 1 Lap   Squats 12x 12x 15x 15x 15x Low box 15x Low box   Step-Ups F/L Low 10x F+L Low 10x Low 7x ea LE Low 7x ea LE Low 10x Low 10x   Step Down F/L         Heel-toe

## 2025-06-23 ENCOUNTER — HOSPITAL ENCOUNTER (OUTPATIENT)
Dept: PHYSICAL THERAPY | Age: 60
Setting detail: THERAPIES SERIES
Discharge: HOME | End: 2025-06-23
Attending: ORTHOPAEDIC SURGERY
Payer: COMMERCIAL

## 2025-06-23 PROCEDURE — 97113 AQUATIC THERAPY/EXERCISES: CPT

## 2025-06-23 NOTE — FLOWSHEET NOTE
[facility]   Outpatient Rehabilitation & Therapy  [address] [facility] [address]  P: [phone]   F: [phone]    Physical Therapy Daily Treatment Note    Date:  2025  Patient Name:  Bandar Willard    :  1965  MRN: [id number]  Physician: Turner Rojas MD                                  Insurance: [employer] (CPT codes verified: 77222, 08386, 99239, 21050, 61658, 15720, 20393, 45643, 63681, 40 visits authorized)     Medical Diagnosis: M54.50 (ICD-10-CM) - Lumbar pain      Rehab Codes: M54.50 (ICD-10-CM) - Lumbar pain, R53.1 - generalized weakness, R26.2 - difficulty walking   Onset Date: 2025                      Next 's appt: TBD  Visit# / total visits:  Cancels/No Shows: 2/0    Subjective:  Pt reports still getting high pain if sitting too long or standing too long.  Reports showering is still the most difficulty thing at home.  States difficulty standing for extended periods of time.    Pain:  [x] Yes  [] No Location: Low back, Neuropathy (B) knees down worse on right.    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  x2 transfer for safety MOD A.  1 on 1 in the pool for first couple visits.    Date 25   Visit #    Walk F/L/R 2 Laps @ Rail 2 Laps @ Rail 2 Laps @ Rail 2 Laps @ Rail   Marching 1 Lap 1 Lap 1 Lap 1 Lap   Squats 15x 15x Low box 15x Low box 15x low box   Step-Ups F/L Low 7x ea LE Low 10x Low 10x Low 10x   Step Down F/L       Heel-toe raises 15x 15x 15x 15x   SLR F/L/R 15x 15x 15x 15x   HS curl 15x 15x 15x 15x   Hip/Knee

## 2025-06-25 ENCOUNTER — HOSPITAL ENCOUNTER (OUTPATIENT)
Dept: PHYSICAL THERAPY | Age: 60
Setting detail: THERAPIES SERIES
Discharge: HOME | End: 2025-06-25
Attending: ORTHOPAEDIC SURGERY
Payer: COMMERCIAL

## 2025-06-25 PROCEDURE — 97113 AQUATIC THERAPY/EXERCISES: CPT

## 2025-06-25 NOTE — FLOWSHEET NOTE
[facility]   Outpatient Rehabilitation & Therapy  [address]  P: [phone]   F: [phone]    Physical Therapy Daily Treatment Note    Date:  2025  Patient Name:  Bandar Willard    :  1965  MRN: [id number]  Physician: Turner Rojas MD                                  Insurance: [facility] (CPT codes verified: 75633, 75526, 89877, 73372, 00024, 48838, 18173, 84267, 09961, 40 visits authorized)     Medical Diagnosis: M54.50 (ICD-10-CM) - Lumbar pain      Rehab Codes: M54.50 (ICD-10-CM) - Lumbar pain, R53.1 - generalized weakness, R26.2 - difficulty walking   Onset Date: 2025                      Next 's appt: TBD  Visit# / total visits: 15/24 Cancels/No Shows: 2/0    Subjective:  Pt reports denies any issues after last therapy visit.  Continue to report improving strength daily with aquatics and standing/exercises at home.  Notes mild soreness in lumbar back but minimal.   Pain:  [x] Yes  [] No Location: Low back, Neuropathy (B) knees down worse on right.    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  x2 transfer for safety MOD A.  1 on 1 in the pool for first couple visits.    Date 25   Visit # 11/24 12/24 13/24 14/24 15/24   Walk F/L/R 2 Laps @ Rail 2 Laps @ Rail 2 Laps @ Rail 2 Laps @ Rail 2 Laps @ Rail   Marching 1 Lap 1 Lap 1 Lap 1 Lap 1 Lap   Squats 15x 15x Low box 15x Low box 15x low box 15x Low box   Step-Ups F/L Low 7x ea LE Low 10x Low 10x Low 10x Low 10x   Step Down F/L        Heel tap L     Low 10x   Heel-toe raises 15x 15x

## 2025-06-30 ENCOUNTER — HOSPITAL ENCOUNTER (OUTPATIENT)
Dept: PHYSICAL THERAPY | Age: 60
Setting detail: THERAPIES SERIES
Discharge: HOME | End: 2025-06-30
Attending: ORTHOPAEDIC SURGERY
Payer: COMMERCIAL

## 2025-06-30 PROCEDURE — 97113 AQUATIC THERAPY/EXERCISES: CPT

## 2025-06-30 NOTE — FLOWSHEET NOTE
[facility]   Outpatient Rehabilitation & Therapy  [address]  P: [phone]   F: [phone]    Physical Therapy Daily Treatment Note    Date:  2025  Patient Name:  Bandar Willard    :  1965  MRN: [id number]  Physician: Turner Rojas MD                                  Insurance: [employer] (CPT codes verified: 47980, 77738, 94351, 44743, 68897, 92618, 10211, 86056, 97751, 40 visits authorized)     Medical Diagnosis: M54.50 (ICD-10-CM) - Lumbar pain      Rehab Codes: M54.50 (ICD-10-CM) - Lumbar pain, R53.1 - generalized weakness, R26.2 - difficulty walking   Onset Date: 2025                      Next 's appt: TBD  Visit# / total visits:  Cancels/No Shows: 2/0    Subjective:  Pt reports trying to stand and walk more at home.  Noting able to stand unsupported for about 1 minute at this time.  States LEs start to get weak and fearful of falling.  States lumbar back is tolerable at this time.   Pain:  [x] Yes  [] No Location: Low back, Neuropathy (B) knees down worse on right.    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  x1 transfer for safety CGA-Min A.  1 on 1 in the pool     Date 25   Visit # 13/24 14/24 15/24 16/24   Walk F/L/R 2 Laps @ Rail 2 Laps @ Rail 2 Laps @ Rail 2 Laps @ Rail   Marching 1 Lap 1 Lap 1 Lap 1 Lap   Squats 15x Low box 15x low box 15x Low box 15x Low box   Step-Ups F/L Low 10x Low 10x Low 10x Low 10x   Step Down F/L       Heel tap L   Low 10x Low 10x   Heel-toe raises 15x 15x 15x 15x   SLR F/L/R 15x 15x 15x 15x   HS

## 2025-07-01 ENCOUNTER — HOSPITAL ENCOUNTER (OUTPATIENT)
Dept: PHYSICAL THERAPY | Age: 60
Setting detail: THERAPIES SERIES
Discharge: HOME | End: 2025-07-01
Attending: ORTHOPAEDIC SURGERY
Payer: COMMERCIAL

## 2025-07-01 PROCEDURE — 97113 AQUATIC THERAPY/EXERCISES: CPT

## 2025-07-01 NOTE — FLOWSHEET NOTE
Total Billable time 40 3      Aquatic Time In: 0845             Aquatic Time Out:  0925    Electronically signed by:  Bryant Lopez, PTA

## 2025-07-02 ENCOUNTER — APPOINTMENT (OUTPATIENT)
Dept: PHYSICAL THERAPY | Age: 60
End: 2025-07-02
Attending: ORTHOPAEDIC SURGERY
Payer: COMMERCIAL

## 2025-07-07 ENCOUNTER — APPOINTMENT (OUTPATIENT)
Dept: PHYSICAL THERAPY | Age: 60
End: 2025-07-07
Attending: ORTHOPAEDIC SURGERY
Payer: COMMERCIAL

## 2025-07-08 ENCOUNTER — OFFICE VISIT (OUTPATIENT)
Dept: ORTHOPEDIC SURGERY | Age: 60
End: 2025-07-08
Payer: COMMERCIAL

## 2025-07-08 ENCOUNTER — PREP FOR PROCEDURE (OUTPATIENT)
Dept: ORTHOPEDIC SURGERY | Age: 60
End: 2025-07-08

## 2025-07-08 ENCOUNTER — HOSPITAL ENCOUNTER (OUTPATIENT)
Dept: PHYSICAL THERAPY | Age: 60
Setting detail: THERAPIES SERIES
Discharge: HOME | End: 2025-07-08
Attending: ORTHOPAEDIC SURGERY
Payer: COMMERCIAL

## 2025-07-08 VITALS — WEIGHT: 235 LBS | HEIGHT: 70 IN | RESPIRATION RATE: 14 BRPM | BODY MASS INDEX: 33.64 KG/M2

## 2025-07-08 DIAGNOSIS — E11.42 DIABETIC POLYNEUROPATHY ASSOCIATED WITH TYPE 2 DIABETES MELLITUS (HCC): ICD-10-CM

## 2025-07-08 DIAGNOSIS — G61.0 GUILLAIN BARRÉ SYNDROME: ICD-10-CM

## 2025-07-08 DIAGNOSIS — M48.062 LUMBAR STENOSIS WITH NEUROGENIC CLAUDICATION: Primary | ICD-10-CM

## 2025-07-08 DIAGNOSIS — R29.898 WEAKNESS OF BOTH LOWER EXTREMITIES: ICD-10-CM

## 2025-07-08 PROCEDURE — 99213 OFFICE O/P EST LOW 20 MIN: CPT | Performed by: ORTHOPAEDIC SURGERY

## 2025-07-08 PROCEDURE — 97113 AQUATIC THERAPY/EXERCISES: CPT

## 2025-07-08 PROCEDURE — 3046F HEMOGLOBIN A1C LEVEL >9.0%: CPT | Performed by: ORTHOPAEDIC SURGERY

## 2025-07-08 NOTE — PROGRESS NOTES
prot & lispro (HUMALOG MIX 50/50 KWIKPEN) (50-50) 100 UNIT per ML SUPN injection pen, Inject 60 Units into the skin 3 times daily (with meals) (Patient not taking: Reported on 5/15/2025), Disp: 5 pen, Rfl: 3    atorvastatin (LIPITOR) 40 MG tablet, Take 1 tablet by mouth daily, Disp: , Rfl:     lisinopril (PRINIVIL;ZESTRIL) 20 MG tablet, Take 1 tablet by mouth daily, Disp: , Rfl:   No Known Allergies  Social History     Socioeconomic History    Marital status:      Spouse name: Not on file    Number of children: Not on file    Years of education: Not on file    Highest education level: Not on file   Occupational History    Not on file   Tobacco Use    Smoking status: Every Day     Types: Cigars    Smokeless tobacco: Never   Substance and Sexual Activity    Alcohol use: Yes     Comment: SOCIALLY    Drug use: Never    Sexual activity: Not on file   Other Topics Concern    Not on file   Social History Narrative    Not on file     Social Drivers of Health     Financial Resource Strain: Not on file   Food Insecurity: No Food Insecurity (6/18/2025)    Received from [facility]    Hunger Screening     Within the past 12 months we worried whether our food would run out before we got money to buy more.: Never True     Within the past 12 months the food we bought just didn't last and we didn't have money to get more.: Never True   Transportation Needs: No Transportation Needs (4/10/2025)    PRAPARE - Transportation     Lack of Transportation (Medical): No     Lack of Transportation (Non-Medical): No   Physical Activity: Not on file   Stress: Not on file   Social Connections: Not on file   Intimate Partner Violence: Not on file   Housing Stability: Low Risk  (4/10/2025)    Housing Stability Vital Sign     Unable to Pay for Housing in the Last Year: No     Number of Times Moved in the Last Year: 0     Homeless in the Last Year: No     Past Medical History:   Diagnosis Date    Chronic cellulitis     Diabetes

## 2025-07-08 NOTE — FLOWSHEET NOTE
[facility]   Outpatient Rehabilitation & Therapy  [address]  P: [phone]   F: [phone]    Physical Therapy Daily Treatment Note    Date:  2025  Patient Name:  Bandar Willard    :  1965  MRN: [id number]  Physician: Turner Rojas MD                                  Insurance: [email or web address] (CPT codes verified: 19582, 97629, 32878, 30433, 12065, 84388, 51015, 79443, 96647, 40 visits authorized)     Medical Diagnosis: M54.50 (ICD-10-CM) - Lumbar pain      Rehab Codes: M54.50 (ICD-10-CM) - Lumbar pain, R53.1 - generalized weakness, R26.2 - difficulty walking   Onset Date: 2025                      Next 's appt: TBD  Visit# / total visits:  Cancels/No Shows: 2/0    Subjective:  Pt reports mild soreness after last therapy visit.  States soreness in low back and anterior thigh after yesterday's appointment but better this morning.  No other changes since last visit.   Pain:  [x] Yes  [] No Location: Low back, Neuropathy (B) knees down worse on right.    Pain Rating: (0-10 scale) 2/10  Pain altered Tx:  [x] No  [] Yes  Action:    Objective:    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  x1 transfer for safety CGA-Min A.  1 on 1 in the pool     Date 25   Visit # 15/24 16/24 17/24 18/24   Walk F/L/R 2 Laps @ Rail 2 Laps @ Rail 2 Laps @ Rail 2 Laps @ Rail   Marching 1 Lap 1 Lap 1 Lap 1 Lap   Squats 15x Low box 15x Low box     Step-Ups F/L Low 10x Low 10x Low 10x Low 10x   Step Down F/L       Heel tap L Low 10x Low 10x     Heel-toe raises 15x 15x 15x 15x   SLR F/L/R 15x 15x 15x 15x   HS curl 15x 15x 15x 15x   Hip/Knee Flex/Ext 15x 15x 15x

## 2025-07-09 ENCOUNTER — APPOINTMENT (OUTPATIENT)
Dept: PHYSICAL THERAPY | Age: 60
End: 2025-07-09
Attending: ORTHOPAEDIC SURGERY
Payer: COMMERCIAL

## 2025-07-11 ENCOUNTER — HOSPITAL ENCOUNTER (OUTPATIENT)
Dept: PHYSICAL THERAPY | Age: 60
Setting detail: THERAPIES SERIES
Discharge: HOME | End: 2025-07-11
Attending: ORTHOPAEDIC SURGERY
Payer: COMMERCIAL

## 2025-07-11 PROCEDURE — 97113 AQUATIC THERAPY/EXERCISES: CPT

## 2025-07-11 NOTE — FLOWSHEET NOTE
Step Down F/L        Heel tap L Low 10x Low 10x      Heel-toe raises 15x 15x 15x 15x NT   SLR F/L/R 15x 15x 15x 15x 15x   HS curl 15x 15x 15x 15x 15x   Hip/Knee Flex/Ext 15x 15x 15x 15x 15x   F/L Lunges     5x ea bottom step           Kickboard Ex. No support Med No support Med No Support Med No Support Med No Support Med   Iso Abd. 12x5\" 15x5\" 15x5\" 15x5\" 15x5\"   Push-pull 12x 15x 15x 15x 15x   Paddling 12x 15x 15x 15x 15x   Press downs  12x 15x 15x 15x 15x           UE Format:        Horiz Abd/Add  15x 15x 18x 18x   IR/ER (wipers) 10x 15x   18x   Alt Flex/Ext 10x 15x 15x 18x 18x   Alt Press Down        Abd/Add  15x 15x 18x 18x   Bicep curls                 Seated in chair        cycling        Jacks (abd/Add)                Deep Water: 1 Noodle 1 Noodle  1 Noodle 1 Noodle 1 Noodle   Hang 1' 1' 1' 1' 1'   Cycling 1' 1' 1' 1' 1.5'   Jacks 1' 1' 1' 1' 1.5'   [employer]-Country 1' 1' 1' 1' 1.5'           Balance        Semi-tandem        SLS        Static stand        Stretches        Achllies   2x20\" 2x20\"    Hamstring   Tall box  2x20\" Tall box  2x20\" 2x20\"   Knee flex/runners stretch   2x20\" 2x20\" 2x20\"           Cool Down Amb w/ hands on Noodle  1.5 laps  Amb w/ Hands on noodle   1 lap Amb w/ hands on noodle  1 Lap Amb w/ hands on noodle  1 Lap Amb no Assistance   1 Lap   Pain Rating 4 5 3 2-3 2      Specific Instructions for next treatment:  add lunges.     Assessment: [x] Progressing toward goals.   Continued with 1 on 1 in pool for safety reasons.  Added step ups to bottom step of stairs along with lunges to bottom step for LE strengthening with core stability.  Good tolerance but noting more soreness in (B) knees when performing.  Also increased deep water exercises for LE strengthening and endurance.  Improved balance throughout with less UE support needed and able to ambulate 1 lap forward walking.  Does not back pain increased with all open chain LE exercises due to SLS on one LE.  Pain reduces after a few seconds

## 2025-07-14 ENCOUNTER — HOSPITAL ENCOUNTER (OUTPATIENT)
Dept: PHYSICAL THERAPY | Age: 60
Setting detail: THERAPIES SERIES
Discharge: HOME | End: 2025-07-14
Attending: ORTHOPAEDIC SURGERY
Payer: COMMERCIAL

## 2025-07-14 PROCEDURE — 97113 AQUATIC THERAPY/EXERCISES: CPT

## 2025-07-14 NOTE — FLOWSHEET NOTE
Step-Ups F/L Low 10x Low 10x Bottom step 8\"  5x ea Bottom step   8\"  5x ea   Step Down F/L       Heel tap L       Heel-toe raises 15x 15x NT    SLR F/L/R 15x 15x 15x 15x   HS curl 15x 15x 15x 15x   Hip/Knee Flex/Ext 15x 15x 15x 15x   F/L Lunges   5x ea bottom step 5x ea bottom step          Kickboard Ex. No Support Med No Support Med No Support Med No Support Med   Iso Abd. 15x5\" 15x5\" 15x5\" 15x5\"   Push-pull 15x 15x 15x 15x   Paddling 15x 15x 15x 15x   Press downs  15x 15x 15x 15x          UE Format:       Horiz Abd/Add 15x 18x 18x 18x   IR/ER (wipers)   18x 18x   Alt Flex/Ext 15x 18x 18x 18x   Alt Press Down       Abd/Add 15x 18x 18x 18x   Bicep curls               Seated in chair       cycling       Jacks (abd/Add)              Deep Water: 1 Noodle 1 Noodle 1 Noodle 1 Noodle   Hang 1' 1' 1'    Cycling 1' 1' 1.5' 1.5'   Jacks 1' 1' 1.5' 1.5'   X-Country 1' 1' 1.5' 1.5'          Balance       Semi-tandem       SLS       Static stand       Stretches       Achllies 2x20\" 2x20\"     Hamstring Tall box  2x20\" Tall box  2x20\" 2x20\" 2x20\"   Knee flex/runners stretch 2x20\" 2x20\" 2x20\" 2x20\"          Cool Down Amb w/ hands on noodle  1 Lap Amb w/ hands on noodle  1 Lap Amb no Assistance   1 Lap Amb no Assistance  1 lap   Pain Rating 3 2-3 2       Specific Instructions for next treatment:  add lunges.     Assessment: [x] Progressing toward goals.  Increased speed of all LE exercises to challenge trunk and core stability further.  Notes increased ease with steps today and continued with knee flexion stretch for mobility of hip and knee.  Notes good stretch in knee and hip.  Continued with progressions of increased reps of UE format and time with each deep water exercises for LE and core endurance.  More difficulty maintaining upright position with jacks today.  Notes fatigue in LEs at end of treatment today.     [] No change.       [] Other:      [x] Patient would continue to benefit from skilled physical therapy services

## 2025-07-16 ENCOUNTER — HOSPITAL ENCOUNTER (OUTPATIENT)
Dept: PHYSICAL THERAPY | Age: 60
Setting detail: THERAPIES SERIES
Discharge: HOME | End: 2025-07-16
Attending: ORTHOPAEDIC SURGERY
Payer: COMMERCIAL

## 2025-07-16 PROCEDURE — 97110 THERAPEUTIC EXERCISES: CPT

## 2025-07-16 NOTE — PROGRESS NOTES
[facility]   Outpatient Rehabilitation & Therapy  [address]  P: [phone]   F: [phone]    Physical Therapy PROGRESS Note and Daily Treatment Note    Date:  2025  Patient Name:  Bandar Willard    :  1965  MRN: [id number]  Physician: Turner Rojas MD                                  Insurance: [facility] (CPT codes verified: 81776, 91537, 13518, 15266, 15518, 07927, 35165, 20871, 74745, 40 visits authorized)     Medical Diagnosis: M54.50 (ICD-10-CM) - Lumbar pain      Rehab Codes: M54.50 (ICD-10-CM) - Lumbar pain, R53.1 - generalized weakness, R26.2 - difficulty walking   Onset Date: 2025                      Next 's appt: TBD  Visit# / total visits:  Cancels/No Shows: 2/0    Subjective:  Pt reports that he would like to complete some physical therapy sessions on land due to progress made in the pool. Pt reports that his back surgery is scheduled on 25 and will be doing pre-op testing 25.     Pain:  [x] Yes  [] No Location: Low back, Neuropathy (B) knees down worse on right.    Pain Rating: (0-10 scale) 2/10  Pain altered Tx:  [x] No  [] Yes  Action:  Comments:    Objective:  Evaluation 2025     Range of Motion  Left Range of Motion  Right   Lumbar Flexion Moderately limited     Lumbar Extension Severely limited, pain     Lumbar Rotation Minimally limited Minimally limited   Lumbar Side Bend Moderately limited Severely limited, pain     2025     Range of Motion  Left Range of Motion  Right   Lumbar Flexion Minimally limited, slight pain     Lumbar Extension Moderately limited, pain     Lumbar Rotation Minimally limited, pain Minimally limited, pain   Lumbar Side Bend Moderately limited, pain Moderately limited, pain     25     Range of Motion  Left Range of Motion  Right   Lumbar Flexion Full, slight pain at end range    Lumbar Extension Moderately limited, pain    Lumbar Rotation Minimally limited, pain Minimally limited, pain   Lumbar Side

## 2025-07-16 NOTE — H&P
HISTORY and PHYSICAL  [facility]       NAME:  Bandar Willard  MRN: [id number]   YOB: 1965   Date: 7/17/2025   Age: 59 y.o.  Gender: male     COMPLAINT AND PRESENT HISTORY:     Bandar Willard is 59 y.o., male, presents for pre-anesthesia/admission testing for Dx:  Lumbar stenosis with neurogenic claudication [M48.062]  With scheduled  LUMBAR LAMINECTOMY DECOMPRESSION POSTERIOR L3-S1/POSSIBLE DISCECTOMIES per Dr. Rojas.     New patient spine office visit per Dr. Rojas on 4/22/2025  This is a 59 y.o. male who presents to the clinic today as a new patient for a follow-up from hospital admission.  Patient reported to [place] ED and was admitted to hospital from, 04/10/25-04/15/24. His chief complaint was of bilateral leg weakness, causing him to endure three falls. He was seen by me during his stay. Prior to this, he was having these symptoms, but it had rapidly gotten worse.  Low back pain with bilateral paresthesia in the legs. He states that his symptoms are back to baseline from the hospital admission, but then contradicts himself by saying that he feels his symptoms are becoming worse.  Patient ambulates with a roll-aid walker    UPDATE:   Patient has a long term history of low back pain approximately 5 years   Pt denies any prior injury to back and denies any prior back surgeries.     Pt has DDD,  pt continues to complains of pain in the lumbar spine above the tailbone area.  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     The symptoms started 4 months ago after his fall.  The problem has been gradually worsening since onset.       Pt complains of pain, deformity, limitation in the range of motion.   Patient describes the pain as burning, shooting pain and rates it at 3/10  on average.     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   Patient has failed conservative treatments.such as gabepention, tylenol interventions to

## 2025-07-16 NOTE — H&P (VIEW-ONLY)
HISTORY and PHYSICAL  [facility]       NAME:  Bandar Willard  MRN: [id number]   YOB: 1965   Date: 7/17/2025   Age: 59 y.o.  Gender: male     COMPLAINT AND PRESENT HISTORY:     Bandar Willard is 59 y.o., male, presents for pre-anesthesia/admission testing for Dx:  Lumbar stenosis with neurogenic claudication [M48.062]  With scheduled  LUMBAR LAMINECTOMY DECOMPRESSION POSTERIOR L3-S1/POSSIBLE DISCECTOMIES per Dr. Rojas.     New patient spine office visit per Dr. Rojas on 4/22/2025  This is a 59 y.o. male who presents to the clinic today as a new patient for a follow-up from hospital admission.  Patient reported to [place] ED and was admitted to hospital from, 04/10/25-04/15/24. His chief complaint was of bilateral leg weakness, causing him to endure three falls. He was seen by me during his stay. Prior to this, he was having these symptoms, but it had rapidly gotten worse.  Low back pain with bilateral paresthesia in the legs. He states that his symptoms are back to baseline from the hospital admission, but then contradicts himself by saying that he feels his symptoms are becoming worse.  Patient ambulates with a roll-aid walker    UPDATE:   Patient has a long term history of low back pain approximately 5 years   Pt denies any prior injury to back and denies any prior back surgeries.     Pt has DDD,  pt continues to complains of pain in the lumbar spine above the tailbone area.  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     The symptoms started 4 months ago after his fall.  The problem has been gradually worsening since onset.       Pt complains of pain, deformity, limitation in the range of motion.   Patient describes the pain as burning, shooting pain and rates it at 3/10  on average.     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   Patient has failed conservative treatments.such as gabepention, tylenol interventions to

## 2025-07-17 ENCOUNTER — HOSPITAL ENCOUNTER (OUTPATIENT)
Dept: PREADMISSION TESTING | Age: 60
Discharge: HOME | End: 2025-07-21
Payer: COMMERCIAL

## 2025-07-17 VITALS
DIASTOLIC BLOOD PRESSURE: 75 MMHG | OXYGEN SATURATION: 97 % | RESPIRATION RATE: 20 BRPM | HEART RATE: 77 BPM | BODY MASS INDEX: 33.64 KG/M2 | SYSTOLIC BLOOD PRESSURE: 116 MMHG | TEMPERATURE: 98.4 F | HEIGHT: 70 IN | WEIGHT: 235 LBS

## 2025-07-17 LAB
ANION GAP SERPL CALCULATED.3IONS-SCNC: 12 MMOL/L (ref 9–16)
BASOPHILS # BLD: 0.03 K/UL (ref 0–0.2)
BASOPHILS NFR BLD: 1 % (ref 0–2)
BUN SERPL-MCNC: 19 MG/DL (ref 6–20)
CALCIUM SERPL-MCNC: 9.7 MG/DL (ref 8.6–10.4)
CHLORIDE SERPL-SCNC: 101 MMOL/L (ref 98–107)
CO2 SERPL-SCNC: 23 MMOL/L (ref 20–31)
CREAT SERPL-MCNC: 0.8 MG/DL (ref 0.7–1.2)
EKG ATRIAL RATE: 78 BPM
EKG P AXIS: 33 DEGREES
EKG P-R INTERVAL: 182 MS
EKG Q-T INTERVAL: 372 MS
EKG QRS DURATION: 100 MS
EKG QTC CALCULATION (BAZETT): 424 MS
EKG R AXIS: -20 DEGREES
EKG T AXIS: 47 DEGREES
EKG VENTRICULAR RATE: 78 BPM
EOSINOPHIL # BLD: 0.19 K/UL (ref 0–0.44)
EOSINOPHILS RELATIVE PERCENT: 4 % (ref 0–4)
ERYTHROCYTE [DISTWIDTH] IN BLOOD BY AUTOMATED COUNT: 11.5 % (ref 11.5–14.9)
GFR, ESTIMATED: >90 ML/MIN/1.73M2
GLUCOSE SERPL-MCNC: 126 MG/DL (ref 74–99)
HCT VFR BLD AUTO: 42.9 % (ref 41–53)
HGB BLD-MCNC: 15.7 G/DL (ref 13.5–17.5)
IMM GRANULOCYTES # BLD AUTO: <0.03 K/UL (ref 0–0.3)
IMM GRANULOCYTES NFR BLD: 0 %
LYMPHOCYTES NFR BLD: 1.2 K/UL (ref 1.1–3.7)
LYMPHOCYTES RELATIVE PERCENT: 26 % (ref 24–44)
MCH RBC QN AUTO: 33.1 PG (ref 26–34)
MCHC RBC AUTO-ENTMCNC: 36.6 G/DL (ref 31–37)
MCV RBC AUTO: 90.3 FL (ref 80–100)
MONOCYTES NFR BLD: 0.37 K/UL (ref 0.1–1.2)
MONOCYTES NFR BLD: 8 % (ref 3–12)
NEUTROPHILS NFR BLD: 61 % (ref 36–66)
NEUTS SEG NFR BLD: 2.91 K/UL (ref 1.5–8.1)
NRBC BLD-RTO: 0 PER 100 WBC
PLATELET # BLD AUTO: 158 K/UL (ref 150–450)
PMV BLD AUTO: 8.8 FL (ref 8–13.5)
POTASSIUM SERPL-SCNC: 4.3 MMOL/L (ref 3.7–5.3)
RBC # BLD AUTO: 4.75 M/UL (ref 4.21–5.77)
SODIUM SERPL-SCNC: 136 MMOL/L (ref 136–145)
WBC OTHER # BLD: 4.7 K/UL (ref 3.5–11)

## 2025-07-17 PROCEDURE — APPSS45 APP SPLIT SHARED TIME 31-45 MINUTES: Performed by: NURSE PRACTITIONER

## 2025-07-17 PROCEDURE — 85025 COMPLETE CBC W/AUTO DIFF WBC: CPT

## 2025-07-17 PROCEDURE — 36415 COLL VENOUS BLD VENIPUNCTURE: CPT

## 2025-07-17 PROCEDURE — 93010 ELECTROCARDIOGRAM REPORT: CPT | Performed by: INTERNAL MEDICINE

## 2025-07-17 PROCEDURE — 93005 ELECTROCARDIOGRAM TRACING: CPT | Performed by: ANESTHESIOLOGY

## 2025-07-17 PROCEDURE — 80048 BASIC METABOLIC PNL TOTAL CA: CPT

## 2025-07-17 ASSESSMENT — ENCOUNTER SYMPTOMS
SINUS PRESSURE: 0
SHORTNESS OF BREATH: 0
ABDOMINAL PAIN: 0
NAUSEA: 0

## 2025-07-17 NOTE — DISCHARGE INSTRUCTIONS
Pre-op Instructions For Out-Patient Surgery    Medication Instructions:  Please stop herbs and any supplements now (includes vitamins and minerals).    For these medications:  Dulaglutide (Trulicity), Exenatide (Byetta and Bydureon, Liraglutide (Victoza), Lixisenatide (Adlyxin), Semaglutide (Ozempic and Rybelsus), Tirzepatide (Mounjaro, Zepbound, Wegovy)- Stop 1 week prior if taking weekly or 1 day prior if taking every 12 hours or daily. Mounjaro Stop 1 week prior to surgery if taking weekly     Please contact your surgeon and prescribing physician for pre-op instructions for any blood thinners.     If you have inhalers/aerosol treatments at home, please use them the morning of your surgery and bring the inhalers with you to the hospital.    Please take the following medications the morning of your surgery with a sip of water:    None     Surgery Instructions:  After midnight before surgery:  Do not eat or drink anything, including water, mints, gum, and hard candy.  You may brush your teeth without swallowing.  No smoking, chewing tobacco, or street drugs.    Please shower or bathe before surgery.  If you were given Surgical Scrub Chlorhexidine Gluconate Liquid (CHG), please shower the night before and the morning of your surgery following the detailed instructions you received during your pre-admission visit.     Please do not wear any cologne, lotion, powder, deodorant, jewelry, piercings, perfume, makeup, nail polish, hair accessories, or hair spray on the day of surgery.  Wear loose comfortable clothing.    Leave your valuables at home but bring a payment source for any after-surgery prescriptions you plan to fill at [facility].  Bring a storage case for any glasses/contacts.    An adult who is responsible for you MUST remain the in the hospital and drive you home and should be with you for the first 24 hours after surgery.     If having out-patient knee and foot

## 2025-07-18 ENCOUNTER — HOSPITAL ENCOUNTER (OUTPATIENT)
Dept: PHYSICAL THERAPY | Age: 60
Setting detail: THERAPIES SERIES
End: 2025-07-18
Attending: ORTHOPAEDIC SURGERY
Payer: COMMERCIAL

## 2025-07-23 ENCOUNTER — HOSPITAL ENCOUNTER (OUTPATIENT)
Dept: PHYSICAL THERAPY | Age: 60
Setting detail: THERAPIES SERIES
Discharge: HOME | End: 2025-07-23
Attending: ORTHOPAEDIC SURGERY
Payer: COMMERCIAL

## 2025-07-23 PROCEDURE — 97110 THERAPEUTIC EXERCISES: CPT

## 2025-07-23 NOTE — FLOWSHEET NOTE
[facility]   Outpatient Rehabilitation & Therapy  [address] [facility] [address]  P: [phone]   F: [phone]    Physical Therapy Daily Treatment Note    Date:  2025  Patient Name:  Bandar Willard    :  1965  MRN: [id number]  Physician: Turner Rojas MD                                  Insurance: [employer] (CPT codes verified: 15395, 29293, 06762, 60761, 53490, 85292, 49382, 80430, 10174, 40 visits authorized)     Medical Diagnosis: M54.50 (ICD-10-CM) - Lumbar pain      Rehab Codes: M54.50 (ICD-10-CM) - Lumbar pain, R53.1 - generalized weakness, R26.2 - difficulty walking   Onset Date: 2025                      Next 's appt: TBD  Visit# / total visits:  Cancels/No Shows: 2/0    Subjective:  Pt has no new reports. States that new exercises have been going well and do not increased his lower back pain.     Pain:  [x] Yes  [] No Location: Low back, Neuropathy (B) knees down worse on right.    Pain Rating: (0-10 scale) 2/10  Pain altered Tx:  [x] No  [] Yes  Action:  Comments:    Objective:  INTERVENTIONS  Reps/ Time Weight/ Level Completed  Today Comments          Therapeutic Exercise       Seated       LAQ 15x B  5\" hold 2# x Increased reps 7/23   Alt march 15x B 2# x Increased reps    Resisted ankle DF 15x B yellow x Increased reps    HS curl 10x B red x New    Seated paloff press and stir the pot 10x B red x New 7/23          Supine       Posterior pelvic tilt 15x 5\" hold  x    Low trunk rotations 10x B  5\" hold  x    BKFO with PPT 10x  Red  x    Alt march with PPT 10x Red  x           Standing       3-way hip  10x B  x New 7/23   Increased reliance on // bars   HS str 2x30\" B  x New 7/23          MANUAL       STM to B lumbar paraspinals with hypervolt 5 min  x New 7/23  Pt reported increased LBP to 6/10 post STM with use of hypervolt   Other:    PLAN FOR NEXT VISIT: alt marches in // bars, discontinue hypervolt    [employer] Education/Home Program:  Access Code:

## 2025-07-25 ENCOUNTER — HOSPITAL ENCOUNTER (OUTPATIENT)
Dept: PHYSICAL THERAPY | Age: 60
Setting detail: THERAPIES SERIES
Discharge: HOME | End: 2025-07-25
Attending: ORTHOPAEDIC SURGERY
Payer: COMMERCIAL

## 2025-07-25 NOTE — FLOWSHEET NOTE
[] [facility]  Outpatient Rehabilitation &  Therapy  [address]  [phone]  [phone] [] [facility]  Outpatient Rehabilitation &  Therapy  [address]  P: [phone]  F: [phone] [] [facility]  Outpatient Rehabilitation &  Therapy  [address]  P: [phone]  F: [phone] [] [facility]  Outpatient Rehabilitation &  Therapy  [address]  [phone]  [phone] [] [facility]  Outpatient Rehabilitation &  Therapy  [address]   P: [phone]  F: [phone]  [] [facility]  Outpatient Rehabilitation &  Therapy  [address]  P: [phone]  F: [phone] [] [facility]  Outpatient Rehabilitation &  Therapy  [address]  P: [phone]  F: [phone] [] [facility]  Outpatient Rehabilitation &  Therapy  [address]  P: [phone]  F: [phone] [] [facility]  Outpatient Rehabilitation &  Therapy  [address]  P: [phone]  F: [phone]  [x] [facility] Outpatient Rehabilitation &  Therapy  [address]  P: [phone]  F: [phone]     Therapy Cancel/No Show note    Date: 2025  Patient: Bandar Willard  : 1965  MRN: [id number]    Cancels/No Shows to date: 30    For today's appointment patient:    [x]  Cancelled    [] Rescheduled appointment    [] No-show     Reason given by patient:    []  Patient ill    []  Conflicting appointment    [] No transportation      [] Conflict with work    [x] No reason given    [] Weather related    [] COVID-19    [x] Other:      Comments:  This is the pt's last scheduled PT visit prior to surgery next week. Per , patient does not wish to be re-scheduled.       [] Next appointment was confirmed    Electronically signed by:

## 2025-07-29 ENCOUNTER — ANESTHESIA EVENT (OUTPATIENT)
Dept: OPERATING ROOM | Age: 60
End: 2025-07-29
Payer: COMMERCIAL

## 2025-07-29 ENCOUNTER — TELEPHONE (OUTPATIENT)
Dept: ORTHOPEDIC SURGERY | Age: 60
End: 2025-07-29

## 2025-07-29 NOTE — PROGRESS NOTES
Per Bert at Dr. Rojas office Dr. Crystal barkley with HGB A1C 8.2 in June and no medical clearance needed and surgery may proceed.

## 2025-07-29 NOTE — TELEPHONE ENCOUNTER
Crystal, patient had A1C drawn in June and it was 8.2.  Results are in care everywhere.    Apparently he was supposed to have it retested yesterday but didn't get it drawn.    You want to cancel?

## 2025-07-29 NOTE — PRE-PROCEDURE INSTRUCTIONS
No answer, left message ?    yes                         Unable to leave message ?    When were you told to arrive at hospital ?  0930    Do you have a  ?    Are you on any blood thinners ?                     If yes when did you stop taking ?    Do you have your prep Rx filled and instruction ?      Nothing to eat the day before , only clear liquids.    Are you experiencing any covid symptoms ?     Do you have any infections or rash we should be aware of ?      Do you have the Hibiclens soap to use the night before and the morning of surgery ?    Nothing to eat or drink after midnight, only a sip of water to take any medication instructed to take the night before.  Wear comfortable clothing, leave any valuables at home, remove any jewelry and body piercing .

## 2025-07-29 NOTE — TELEPHONE ENCOUNTER
Spoke with Dr Rojas and he was fine proceeding with surgery with A1C of 8.2.  Radu at [facility] surgery informed.

## 2025-07-30 ENCOUNTER — ANESTHESIA (OUTPATIENT)
Dept: OPERATING ROOM | Age: 60
End: 2025-07-30
Payer: COMMERCIAL

## 2025-07-30 ENCOUNTER — APPOINTMENT (OUTPATIENT)
Dept: GENERAL RADIOLOGY | Age: 60
End: 2025-07-30
Attending: ORTHOPAEDIC SURGERY
Payer: COMMERCIAL

## 2025-07-30 ENCOUNTER — HOSPITAL ENCOUNTER (OUTPATIENT)
Age: 60
Setting detail: OBSERVATION
Discharge: HOME | End: 2025-08-01
Attending: ORTHOPAEDIC SURGERY | Admitting: ORTHOPAEDIC SURGERY
Payer: COMMERCIAL

## 2025-07-30 DIAGNOSIS — M48.062 LUMBAR STENOSIS WITH NEUROGENIC CLAUDICATION: Primary | ICD-10-CM

## 2025-07-30 PROBLEM — M48.061 LUMBAR FORAMINAL STENOSIS: Status: ACTIVE | Noted: 2025-07-30

## 2025-07-30 LAB
GLUCOSE BLD-MCNC: 156 MG/DL (ref 75–110)
GLUCOSE BLD-MCNC: 253 MG/DL (ref 75–110)

## 2025-07-30 PROCEDURE — 6360000002 HC RX W HCPCS: Performed by: ORTHOPAEDIC SURGERY

## 2025-07-30 PROCEDURE — 6360000002 HC RX W HCPCS

## 2025-07-30 PROCEDURE — 3700000000 HC ANESTHESIA ATTENDED CARE: Performed by: ORTHOPAEDIC SURGERY

## 2025-07-30 PROCEDURE — 2720000010 HC SURG SUPPLY STERILE: Performed by: ORTHOPAEDIC SURGERY

## 2025-07-30 PROCEDURE — 7100000001 HC PACU RECOVERY - ADDTL 15 MIN: Performed by: ORTHOPAEDIC SURGERY

## 2025-07-30 PROCEDURE — 6370000000 HC RX 637 (ALT 250 FOR IP): Performed by: ANESTHESIOLOGY

## 2025-07-30 PROCEDURE — 2500000003 HC RX 250 WO HCPCS: Performed by: ORTHOPAEDIC SURGERY

## 2025-07-30 PROCEDURE — 6360000002 HC RX W HCPCS: Performed by: ANESTHESIOLOGY

## 2025-07-30 PROCEDURE — 6370000000 HC RX 637 (ALT 250 FOR IP): Performed by: ORTHOPAEDIC SURGERY

## 2025-07-30 PROCEDURE — 3600000014 HC SURGERY LEVEL 4 ADDTL 15MIN: Performed by: ORTHOPAEDIC SURGERY

## 2025-07-30 PROCEDURE — G0378 HOSPITAL OBSERVATION PER HR: HCPCS

## 2025-07-30 PROCEDURE — 3700000001 HC ADD 15 MINUTES (ANESTHESIA): Performed by: ORTHOPAEDIC SURGERY

## 2025-07-30 PROCEDURE — 2580000003 HC RX 258: Performed by: ANESTHESIOLOGY

## 2025-07-30 PROCEDURE — 2500000003 HC RX 250 WO HCPCS

## 2025-07-30 PROCEDURE — 3600000004 HC SURGERY LEVEL 4 BASE: Performed by: ORTHOPAEDIC SURGERY

## 2025-07-30 PROCEDURE — 2709999900 HC NON-CHARGEABLE SUPPLY: Performed by: ORTHOPAEDIC SURGERY

## 2025-07-30 PROCEDURE — 82947 ASSAY GLUCOSE BLOOD QUANT: CPT

## 2025-07-30 PROCEDURE — 7100000000 HC PACU RECOVERY - FIRST 15 MIN: Performed by: ORTHOPAEDIC SURGERY

## 2025-07-30 RX ORDER — ONDANSETRON 2 MG/ML
INJECTION INTRAMUSCULAR; INTRAVENOUS
Status: DISCONTINUED | OUTPATIENT
Start: 2025-07-30 | End: 2025-07-30 | Stop reason: SDUPTHER

## 2025-07-30 RX ORDER — ACETAMINOPHEN 325 MG/1
650 TABLET ORAL EVERY 6 HOURS
Status: DISCONTINUED | OUTPATIENT
Start: 2025-07-30 | End: 2025-08-01 | Stop reason: HOSPADM

## 2025-07-30 RX ORDER — SODIUM CHLORIDE 0.9 % (FLUSH) 0.9 %
5-40 SYRINGE (ML) INJECTION EVERY 12 HOURS SCHEDULED
Status: DISCONTINUED | OUTPATIENT
Start: 2025-07-30 | End: 2025-07-30 | Stop reason: HOSPADM

## 2025-07-30 RX ORDER — MORPHINE SULFATE 2 MG/ML
2 INJECTION, SOLUTION INTRAMUSCULAR; INTRAVENOUS
Status: DISCONTINUED | OUTPATIENT
Start: 2025-07-30 | End: 2025-08-01 | Stop reason: HOSPADM

## 2025-07-30 RX ORDER — ROCURONIUM BROMIDE 10 MG/ML
INJECTION, SOLUTION INTRAVENOUS
Status: DISCONTINUED | OUTPATIENT
Start: 2025-07-30 | End: 2025-07-30 | Stop reason: SDUPTHER

## 2025-07-30 RX ORDER — OXYCODONE HYDROCHLORIDE 5 MG/1
5 TABLET ORAL EVERY 4 HOURS PRN
Status: DISCONTINUED | OUTPATIENT
Start: 2025-07-30 | End: 2025-08-01 | Stop reason: HOSPADM

## 2025-07-30 RX ORDER — LIDOCAINE HYDROCHLORIDE 10 MG/ML
1 INJECTION, SOLUTION EPIDURAL; INFILTRATION; INTRACAUDAL; PERINEURAL
Status: COMPLETED | OUTPATIENT
Start: 2025-07-30 | End: 2025-07-30

## 2025-07-30 RX ORDER — DULOXETIN HYDROCHLORIDE 60 MG/1
60 CAPSULE, DELAYED RELEASE ORAL DAILY
Status: DISCONTINUED | OUTPATIENT
Start: 2025-07-30 | End: 2025-08-01 | Stop reason: HOSPADM

## 2025-07-30 RX ORDER — SODIUM CHLORIDE 9 MG/ML
INJECTION, SOLUTION INTRAVENOUS PRN
Status: DISCONTINUED | OUTPATIENT
Start: 2025-07-30 | End: 2025-07-30 | Stop reason: HOSPADM

## 2025-07-30 RX ORDER — SODIUM CHLORIDE 0.9 % (FLUSH) 0.9 %
5-40 SYRINGE (ML) INJECTION PRN
Status: DISCONTINUED | OUTPATIENT
Start: 2025-07-30 | End: 2025-07-30 | Stop reason: HOSPADM

## 2025-07-30 RX ORDER — GABAPENTIN 300 MG/1
300 CAPSULE ORAL ONCE
Status: COMPLETED | OUTPATIENT
Start: 2025-07-30 | End: 2025-07-30

## 2025-07-30 RX ORDER — LIDOCAINE HYDROCHLORIDE 10 MG/ML
INJECTION, SOLUTION EPIDURAL; INFILTRATION; INTRACAUDAL; PERINEURAL
Status: DISCONTINUED | OUTPATIENT
Start: 2025-07-30 | End: 2025-07-30 | Stop reason: SDUPTHER

## 2025-07-30 RX ORDER — SODIUM CHLORIDE 9 MG/ML
INJECTION, SOLUTION INTRAVENOUS PRN
Status: DISCONTINUED | OUTPATIENT
Start: 2025-07-30 | End: 2025-08-01 | Stop reason: HOSPADM

## 2025-07-30 RX ORDER — ONDANSETRON 2 MG/ML
4 INJECTION INTRAMUSCULAR; INTRAVENOUS
Status: DISCONTINUED | OUTPATIENT
Start: 2025-07-30 | End: 2025-07-30 | Stop reason: HOSPADM

## 2025-07-30 RX ORDER — SODIUM CHLORIDE 9 MG/ML
INJECTION, SOLUTION INTRAVENOUS CONTINUOUS
Status: DISCONTINUED | OUTPATIENT
Start: 2025-07-30 | End: 2025-07-30 | Stop reason: HOSPADM

## 2025-07-30 RX ORDER — MORPHINE SULFATE 4 MG/ML
4 INJECTION, SOLUTION INTRAMUSCULAR; INTRAVENOUS
Status: DISCONTINUED | OUTPATIENT
Start: 2025-07-30 | End: 2025-08-01 | Stop reason: HOSPADM

## 2025-07-30 RX ORDER — MIDAZOLAM HYDROCHLORIDE 2 MG/2ML
INJECTION, SOLUTION INTRAMUSCULAR; INTRAVENOUS
Status: DISCONTINUED | OUTPATIENT
Start: 2025-07-30 | End: 2025-07-30 | Stop reason: SDUPTHER

## 2025-07-30 RX ORDER — ACETAMINOPHEN 500 MG
1000 TABLET ORAL ONCE
Status: COMPLETED | OUTPATIENT
Start: 2025-07-30 | End: 2025-07-30

## 2025-07-30 RX ORDER — LISINOPRIL 20 MG/1
20 TABLET ORAL DAILY
Status: DISCONTINUED | OUTPATIENT
Start: 2025-07-31 | End: 2025-08-01 | Stop reason: HOSPADM

## 2025-07-30 RX ORDER — DIPHENHYDRAMINE HYDROCHLORIDE 50 MG/ML
12.5 INJECTION, SOLUTION INTRAMUSCULAR; INTRAVENOUS
Status: DISCONTINUED | OUTPATIENT
Start: 2025-07-30 | End: 2025-07-30 | Stop reason: HOSPADM

## 2025-07-30 RX ORDER — SODIUM CHLORIDE 0.9 % (FLUSH) 0.9 %
5-40 SYRINGE (ML) INJECTION PRN
Status: DISCONTINUED | OUTPATIENT
Start: 2025-07-30 | End: 2025-08-01 | Stop reason: HOSPADM

## 2025-07-30 RX ORDER — PROPOFOL 10 MG/ML
INJECTION, EMULSION INTRAVENOUS
Status: DISCONTINUED | OUTPATIENT
Start: 2025-07-30 | End: 2025-07-30 | Stop reason: SDUPTHER

## 2025-07-30 RX ORDER — FENTANYL CITRATE 0.05 MG/ML
50 INJECTION, SOLUTION INTRAMUSCULAR; INTRAVENOUS EVERY 5 MIN PRN
Status: DISCONTINUED | OUTPATIENT
Start: 2025-07-30 | End: 2025-07-30 | Stop reason: HOSPADM

## 2025-07-30 RX ORDER — DEXAMETHASONE SODIUM PHOSPHATE 4 MG/ML
INJECTION, SOLUTION INTRA-ARTICULAR; INTRALESIONAL; INTRAMUSCULAR; INTRAVENOUS; SOFT TISSUE
Status: DISCONTINUED | OUTPATIENT
Start: 2025-07-30 | End: 2025-07-30 | Stop reason: SDUPTHER

## 2025-07-30 RX ORDER — ATORVASTATIN CALCIUM 40 MG/1
40 TABLET, FILM COATED ORAL DAILY
Status: DISCONTINUED | OUTPATIENT
Start: 2025-07-30 | End: 2025-08-01 | Stop reason: HOSPADM

## 2025-07-30 RX ORDER — CLOBETASOL PROPIONATE 0.5 MG/G
CREAM TOPICAL 2 TIMES DAILY
Status: DISCONTINUED | OUTPATIENT
Start: 2025-07-30 | End: 2025-08-01 | Stop reason: HOSPADM

## 2025-07-30 RX ORDER — OXYCODONE HYDROCHLORIDE 10 MG/1
10 TABLET ORAL EVERY 4 HOURS PRN
Status: DISCONTINUED | OUTPATIENT
Start: 2025-07-30 | End: 2025-08-01 | Stop reason: HOSPADM

## 2025-07-30 RX ORDER — BUPIVACAINE HYDROCHLORIDE AND EPINEPHRINE 2.5; 5 MG/ML; UG/ML
INJECTION, SOLUTION EPIDURAL; INFILTRATION; INTRACAUDAL; PERINEURAL PRN
Status: DISCONTINUED | OUTPATIENT
Start: 2025-07-30 | End: 2025-07-30 | Stop reason: ALTCHOICE

## 2025-07-30 RX ORDER — OXYBUTYNIN CHLORIDE 10 MG/1
10 TABLET, EXTENDED RELEASE ORAL DAILY
Status: DISCONTINUED | OUTPATIENT
Start: 2025-07-30 | End: 2025-08-01 | Stop reason: HOSPADM

## 2025-07-30 RX ORDER — TRANEXAMIC ACID 100 MG/ML
INJECTION, SOLUTION INTRAVENOUS
Status: DISCONTINUED | OUTPATIENT
Start: 2025-07-30 | End: 2025-07-30 | Stop reason: SDUPTHER

## 2025-07-30 RX ORDER — SODIUM CHLORIDE 0.9 % (FLUSH) 0.9 %
5-40 SYRINGE (ML) INJECTION EVERY 12 HOURS SCHEDULED
Status: DISCONTINUED | OUTPATIENT
Start: 2025-07-30 | End: 2025-08-01 | Stop reason: HOSPADM

## 2025-07-30 RX ORDER — FENTANYL CITRATE 50 UG/ML
INJECTION, SOLUTION INTRAMUSCULAR; INTRAVENOUS
Status: DISCONTINUED | OUTPATIENT
Start: 2025-07-30 | End: 2025-07-30 | Stop reason: SDUPTHER

## 2025-07-30 RX ORDER — GABAPENTIN 400 MG/1
800 CAPSULE ORAL 3 TIMES DAILY
Status: DISCONTINUED | OUTPATIENT
Start: 2025-07-30 | End: 2025-08-01 | Stop reason: HOSPADM

## 2025-07-30 RX ADMIN — ONDANSETRON 4 MG: 2 INJECTION, SOLUTION INTRAMUSCULAR; INTRAVENOUS at 13:54

## 2025-07-30 RX ADMIN — SUGAMMADEX 200 MG: 100 INJECTION, SOLUTION INTRAVENOUS at 14:19

## 2025-07-30 RX ADMIN — ROCURONIUM BROMIDE 50 MG: 10 INJECTION, SOLUTION INTRAVENOUS at 11:40

## 2025-07-30 RX ADMIN — FENTANYL CITRATE 25 MCG: 50 INJECTION INTRAMUSCULAR; INTRAVENOUS at 12:00

## 2025-07-30 RX ADMIN — SODIUM CHLORIDE: 900 INJECTION, SOLUTION INTRAVENOUS at 10:22

## 2025-07-30 RX ADMIN — SODIUM CHLORIDE: 900 INJECTION, SOLUTION INTRAVENOUS at 13:30

## 2025-07-30 RX ADMIN — PROPOFOL 200 MG: 10 INJECTION, EMULSION INTRAVENOUS at 11:38

## 2025-07-30 RX ADMIN — SODIUM CHLORIDE, PRESERVATIVE FREE 10 ML: 5 INJECTION INTRAVENOUS at 20:18

## 2025-07-30 RX ADMIN — FENTANYL CITRATE 50 MCG: 50 INJECTION INTRAMUSCULAR; INTRAVENOUS at 11:38

## 2025-07-30 RX ADMIN — CLOBETASOL PROPIONATE CREAM USP, 0.05%: 0.5 CREAM TOPICAL at 20:16

## 2025-07-30 RX ADMIN — FENTANYL CITRATE 25 MCG: 50 INJECTION INTRAMUSCULAR; INTRAVENOUS at 14:20

## 2025-07-30 RX ADMIN — OXYCODONE HYDROCHLORIDE 10 MG: 10 TABLET ORAL at 18:32

## 2025-07-30 RX ADMIN — Medication 2000 MG: at 11:49

## 2025-07-30 RX ADMIN — LIDOCAINE HYDROCHLORIDE 40 MG: 10 INJECTION, SOLUTION EPIDURAL; INFILTRATION; INTRACAUDAL; PERINEURAL at 11:38

## 2025-07-30 RX ADMIN — MORPHINE SULFATE 4 MG: 4 INJECTION, SOLUTION INTRAMUSCULAR; INTRAVENOUS at 20:13

## 2025-07-30 RX ADMIN — LIDOCAINE HYDROCHLORIDE 1 ML: 10 INJECTION, SOLUTION EPIDURAL; INFILTRATION; INTRACAUDAL; PERINEURAL at 10:39

## 2025-07-30 RX ADMIN — OXYCODONE HYDROCHLORIDE 10 MG: 10 TABLET ORAL at 22:30

## 2025-07-30 RX ADMIN — MIDAZOLAM HYDROCHLORIDE 2 MG: 1 INJECTION, SOLUTION INTRAMUSCULAR; INTRAVENOUS at 11:33

## 2025-07-30 RX ADMIN — DULOXETINE 60 MG: 60 CAPSULE, DELAYED RELEASE ORAL at 20:16

## 2025-07-30 RX ADMIN — DEXAMETHASONE SODIUM PHOSPHATE 4 MG: 4 INJECTION, SOLUTION INTRAMUSCULAR; INTRAVENOUS at 12:00

## 2025-07-30 RX ADMIN — MORPHINE SULFATE 4 MG: 4 INJECTION, SOLUTION INTRAMUSCULAR; INTRAVENOUS at 16:52

## 2025-07-30 RX ADMIN — ACETAMINOPHEN 650 MG: 325 TABLET ORAL at 22:30

## 2025-07-30 RX ADMIN — Medication 2000 MG: at 20:14

## 2025-07-30 RX ADMIN — TRANEXAMIC ACID 1000 MG: 1 INJECTION, SOLUTION INTRAVENOUS at 11:59

## 2025-07-30 RX ADMIN — OXYBUTYNIN CHLORIDE 10 MG: 10 TABLET, EXTENDED RELEASE ORAL at 20:15

## 2025-07-30 RX ADMIN — HYDROMORPHONE HYDROCHLORIDE 0.5 MG: 1 INJECTION, SOLUTION INTRAMUSCULAR; INTRAVENOUS; SUBCUTANEOUS at 14:50

## 2025-07-30 RX ADMIN — GABAPENTIN 800 MG: 400 CAPSULE ORAL at 20:15

## 2025-07-30 RX ADMIN — ATORVASTATIN CALCIUM 40 MG: 40 TABLET, FILM COATED ORAL at 20:15

## 2025-07-30 RX ADMIN — ACETAMINOPHEN 1000 MG: 500 TABLET ORAL at 10:24

## 2025-07-30 RX ADMIN — HYDROMORPHONE HYDROCHLORIDE 0.5 MG: 1 INJECTION, SOLUTION INTRAMUSCULAR; INTRAVENOUS; SUBCUTANEOUS at 15:19

## 2025-07-30 RX ADMIN — GABAPENTIN 300 MG: 300 CAPSULE ORAL at 10:24

## 2025-07-30 ASSESSMENT — PAIN DESCRIPTION - FREQUENCY
FREQUENCY: CONTINUOUS

## 2025-07-30 ASSESSMENT — PAIN SCALES - GENERAL
PAINLEVEL_OUTOF10: 9
PAINLEVEL_OUTOF10: 8
PAINLEVEL_OUTOF10: 7
PAINLEVEL_OUTOF10: 0
PAINLEVEL_OUTOF10: 9
PAINLEVEL_OUTOF10: 9
PAINLEVEL_OUTOF10: 5
PAINLEVEL_OUTOF10: 0
PAINLEVEL_OUTOF10: 5
PAINLEVEL_OUTOF10: 7
PAINLEVEL_OUTOF10: 7
PAINLEVEL_OUTOF10: 5
PAINLEVEL_OUTOF10: 5

## 2025-07-30 ASSESSMENT — PAIN DESCRIPTION - ONSET
ONSET: ON-GOING
ONSET: AWAKENED FROM SLEEP

## 2025-07-30 ASSESSMENT — PAIN DESCRIPTION - DESCRIPTORS
DESCRIPTORS: ACHING
DESCRIPTORS: DISCOMFORT
DESCRIPTORS: THROBBING;DISCOMFORT
DESCRIPTORS: ACHING
DESCRIPTORS: THROBBING
DESCRIPTORS: ACHING
DESCRIPTORS: BURNING
DESCRIPTORS: DISCOMFORT
DESCRIPTORS: DISCOMFORT

## 2025-07-30 ASSESSMENT — PAIN - FUNCTIONAL ASSESSMENT
PAIN_FUNCTIONAL_ASSESSMENT: 0-10
PAIN_FUNCTIONAL_ASSESSMENT: PREVENTS OR INTERFERES SOME ACTIVE ACTIVITIES AND ADLS
PAIN_FUNCTIONAL_ASSESSMENT: ACTIVITIES ARE NOT PREVENTED
PAIN_FUNCTIONAL_ASSESSMENT: ACTIVITIES ARE NOT PREVENTED
PAIN_FUNCTIONAL_ASSESSMENT: 0-10
PAIN_FUNCTIONAL_ASSESSMENT: PREVENTS OR INTERFERES SOME ACTIVE ACTIVITIES AND ADLS

## 2025-07-30 ASSESSMENT — PAIN DESCRIPTION - LOCATION
LOCATION: BACK

## 2025-07-30 ASSESSMENT — PAIN DESCRIPTION - ORIENTATION
ORIENTATION: LOWER;MID;POSTERIOR
ORIENTATION: MID
ORIENTATION: MID;LOWER;POSTERIOR
ORIENTATION: LOWER;MID;POSTERIOR
ORIENTATION: LOWER
ORIENTATION: MID
ORIENTATION: MID
ORIENTATION: LOWER

## 2025-07-30 ASSESSMENT — PAIN DESCRIPTION - PAIN TYPE
TYPE: SURGICAL PAIN
TYPE: ACUTE PAIN
TYPE: SURGICAL PAIN

## 2025-07-30 ASSESSMENT — LIFESTYLE VARIABLES: SMOKING_STATUS: 1

## 2025-07-30 NOTE — PLAN OF CARE
Problem: Discharge Planning  Goal: Discharge to home or other facility with appropriate resources  Outcome: Progressing     Problem: Chronic Conditions and Co-morbidities  Goal: Patient's chronic conditions and co-morbidity symptoms are monitored and maintained or improved  Outcome: Progressing     Problem: Pain  Goal: Verbalizes/displays adequate comfort level or baseline comfort level  Outcome: Progressing     Problem: Skin/Tissue Integrity  Goal: Skin integrity remains intact  Description: 1.  Monitor for areas of redness and/or skin breakdown  2.  Assess vascular access sites hourly  3.  Every 4-6 hours minimum:  Change oxygen saturation probe site  4.  Every 4-6 hours:  If on nasal continuous positive airway pressure, respiratory therapy assess nares and determine need for appliance change or resting period  Outcome: Progressing     Problem: Safety - Adult  Goal: Free from fall injury  Outcome: Progressing     Problem: ABCDS Injury Assessment  Goal: Absence of physical injury  Outcome: Progressing

## 2025-07-30 NOTE — INTERVAL H&P NOTE
Update History & Physical    The patient's History and Physical of July 17, 2025 was reviewed with the patient and I examined the patient. There was no change. Here today for LUMBAR LAMINECTOMY DECOMPRESSION POSTERIOR L3-S1/POSSIBLE DISCECTOMIES per Dr. Rojas.     Pt AAO x 3 in NAD. HRRR. No adventitious lung sounds. No respiratory distress.   NPO p MN. Last dose of mounjaro was 10 days ago. Pt reports last dose of jardiance was yesterday. Denies abdominal pain, nausea, vomiting. Denies taking any blood thinning medications. Denies recent or current chest pain/pressure, palpitations, SOB, recent URI, fever or chills. Review vitals per RN flowsheet.     Contains abnormal data POCT Hemoglobin A1c  Order: [id number]  Component  Ref Range & Units 6/18/25 1008   External Poct Hgb A1C  4 - 7 % 8.2 Abnormal    ADA Target < 8 No   Resulting Agency MANUALLY TRANSCRIBED RESULTS     Specimen Collected: 06/18/25 10:08    Performed by: MANUALLY TRANSCRIBED RESULTS Last Resulted: 06/18/25 10:08   Received From: [employer]  Result Received: 06/20/25 08:54         Electronically signed by JODI Yin CNP on 7/30/2025 at 10:27 AM

## 2025-07-30 NOTE — OP NOTE
[facility]                [address] [place] [address]                            OPERATIVE REPORT      PATIENT NAME: DAYO WATSON                  : 1965  MED REC NO: [id number]                          ROOM: [facility]  ACCOUNT NO: [id number]                       ADMIT DATE: 2025  PROVIDER: Turner Rojas MD      DATE OF PROCEDURE:  2025    SURGEON:  Turner Rojas MD    PREOPERATIVE DIAGNOSES:    1. Severe lumbar spinal stenosis L3-4, L4-5, L5-S1.  2. Recent history of Guillain-Camargo.  3. Severe diabetic polyneuropathy.  4. Difficulty walking.    POSTOPERATIVE DIAGNOSES:    1. Severe lumbar spinal stenosis L3-4, L4-5, L5-S1.  2. Recent history of Guillain-Camargo.  3. Severe diabetic polyneuropathy.  4. Difficulty walking.    PROCEDURES PERFORMED:  Lumbar decompression L3-4, L4-5, L5-S1 with laminotomies, bilateral partial medial facetectomies, neural foraminotomies, fluoroscopic assistance.    FIRST ASSISTANT:  ANY Persaud, utilized for patient positioning, wound retraction, suctioning, and skin closure.    ANESTHESIA:  General.    ESTIMATED BLOOD LOSS:  Minimal.    COMPLICATIONS:  None.    SPECIMEN:  None.    IMPLANTS:  None.    DRAINS:  None.    FINDINGS:  The patient with wide decompressions, nearly pedicle to pedicle with significant foraminotomies L3-4, L4-5, L5-S1.    PROCEDURE IN DETAIL:  The patient was taken to the operating room, placed under general anesthesia, transferred to [facility] table, checked for padding and positioning.  Back was prepped and draped in usual sterile fashion.  With the aid of fluoroscopic assistance, 18-gauge spinal needle was advanced from the skin to the right L5 transverse process, backed off just a little bit.  Field block was given.  This was then done on the left, followed by a madison-incisional injection.  Midline incision now made, carried down through skin, subcutaneous tissue, carried out to  the lateral aspect of the facets at 3-4, 4-5, and 5-1.    Beginning at 4-5, spinous processes were trimmed.  They were nearly ankylosed.  Dissection was carried down with combination of double-action rongeur to about the facet level.  Then with a high-speed shayy bur, largely an extracapsular decompression about 80% to 90% completed.    Now, the ligamentum flavum, medial facet capsules taken down and the partial medial facetectomies largely completed with a Kerrison #3 and a high-speed shayy bur, also significant foraminotomies were performed bilaterally.    Having completed the decompression, fluoroscopy was again checked to verify the level.  Similar procedure now performed at L5-S1 and then at L3-4.  At all levels, a lot of decompression was largely done with a significant laminotomy, partial medial facetectomies, mostly completed with the high-speed shayy bur down to the facet capsule, and then gradually as we were able to get everything exposed, ligamentum flavum was taken down followed by medial facet capsules followed by completing the partial medial facetectomies and the foraminotomies.    After decompressing all 3 levels, lateral fluoroscopic imaging was again obtained to verify caudal and cephalad extent of decompressions.  Excellent hemostasis was really obtained pretty much throughout the case.  Once we were doing the actual decompression, there were a couple of oozers from the edges of the facet that had to be controlled early on.    At this juncture, deep fascia reapproximated with #2 Vicryl suture, subcuticular layer with 2-0 Vicryl followed by skin staples.  Sterile dressing applied.  The patient awakened from anesthesia, taken to recovery room in stable condition.  Complications arising during the operation, none noted.          CASSANDRA PARRY MD      D:  07/30/2025 14:15:25     T:  07/30/2025 14:54:50     [facility]  Job #:  [id number]     Doc#:  [id number]

## 2025-07-30 NOTE — BRIEF OP NOTE
Brief Postoperative Note      Patient: Bandar Willard  YOB: 1965  MRN: [id number]    Date of Procedure: 7/30/2025    Pre-Op Diagnosis Codes:      * Lumbar stenosis with neurogenic claudication [M48.062]    Post-Op Diagnosis: Same       Procedure(s):  LUMBAR LAMINECTOMY DECOMPRESSION POSTERIOR L3-S1 /POSSIBLE DISCECTOMIES of L3-S1    Surgeon(s):  Tiera Pena PA Beeks, David A, MD    Assistant:  * No surgical staff found *    Anesthesia: General    Estimated Blood Loss (mL): Minimal    Complications: None    Specimens:   * No specimens in log *    Implants:  * No implants in log *      Drains: * No LDAs found *    Findings:  Present At Time Of Surgery (PATOS) (choose all levels that have infection present):  No infection present  Other Findings: see dictation    Electronically signed by Turner Rojas MD on 7/30/2025 at 2:10 PM

## 2025-07-30 NOTE — ANESTHESIA POSTPROCEDURE EVALUATION
Department of Anesthesiology  Postprocedure Note    Patient: Bandar Willard  MRN: [id number]  YOB: 1965  Date of evaluation: 7/30/2025    Procedure Summary       Date: 07/30/25 Room / Location: [address]    Anesthesia Start: 1134 Anesthesia Stop: 1428    Procedure: LUMBAR LAMINECTOMY DECOMPRESSION POSTERIOR L3-S1 (Spine Lumbar) Diagnosis:       Lumbar stenosis with neurogenic claudication      (Lumbar stenosis with neurogenic claudication [M48.062])    Surgeons: Turner Rojas MD Responsible Provider: Carina Woodall MD    Anesthesia Type: general ASA Status: 3            Anesthesia Type: No value filed.    Ian Phase I: Ian Score: 10    Ian Phase II:      Anesthesia Post Evaluation    Comments: POST- ANESTHESIA EVALUATION       Pt Name: Bandar Willard  MRN: [id number]  YOB: 1965  Date of evaluation: 7/30/2025  Time:  4:19 PM      /83   Pulse 85   Temp 97.4 °F (36.3 °C) (Infrared)   Resp 13   Ht 1.778 m (5' 10\")   Wt 106.6 kg (235 lb)   SpO2 97%   BMI 33.72 kg/m²      Consciousness Level  Awake  Cardiopulmonary Status  Stable  Pain Adequately Treated YES  Nausea / Vomiting  NO  Adequate Hydration  YES  Anesthesia Related Complications NONE      Electronically signed by Alex Jain MD on 7/30/2025 at 4:19 PM           No notable events documented.

## 2025-07-30 NOTE — ANESTHESIA PRE PROCEDURE
liquid consumption: 07/29/25                        Date of last solid food consumption: 07/29/25    BMI:   Wt Readings from Last 3 Encounters:   07/30/25 106.6 kg (235 lb)   07/17/25 106.6 kg (235 lb)   07/08/25 106.6 kg (235 lb)     Body mass index is 33.72 kg/m².    CBC:   Lab Results   Component Value Date/Time    WBC 4.7 07/17/2025 10:02 AM    RBC 4.75 07/17/2025 10:02 AM    HGB 15.7 07/17/2025 10:02 AM    HCT 42.9 07/17/2025 10:02 AM    MCV 90.3 07/17/2025 10:02 AM    RDW 11.5 07/17/2025 10:02 AM     07/17/2025 10:02 AM       CMP:   Lab Results   Component Value Date/Time     07/17/2025 10:02 AM    K 4.3 07/17/2025 10:02 AM     07/17/2025 10:02 AM    CO2 23 07/17/2025 10:02 AM    BUN 19 07/17/2025 10:02 AM    CREATININE 0.8 07/17/2025 10:02 AM    GFRAA >60 03/06/2020 04:55 AM    LABGLOM >90 07/17/2025 10:02 AM    GLUCOSE 126 07/17/2025 10:02 AM    CALCIUM 9.7 07/17/2025 10:02 AM    BILITOT 0.5 04/10/2025 04:45 PM    ALKPHOS 81 04/10/2025 04:45 PM    AST 23 04/10/2025 04:45 PM    ALT 33 04/10/2025 04:45 PM       POC Tests:   Recent Labs     07/30/25  1027   POCGLU 156*       Coags:   Lab Results   Component Value Date/Time    PROTIME 13.6 04/11/2025 09:09 AM    INR 1.0 04/11/2025 09:09 AM       HCG (If Applicable): No results found for: \"PREGTESTUR\", \"PREGSERUM\", \"HCG\", \"HCGQUANT\"     ABGs: No results found for: \"PHART\", \"PO2ART\", \"UWL4GPM\", \"GDR5XVS\", \"BEART\", \"A6JEHKMR\"     Type & Screen (If Applicable):  No results found for: \"ABORH\", \"LABANTI\"    Drug/Infectious Status (If Applicable):  No results found for: \"HIV\", \"HEPCAB\"    COVID-19 Screening (If Applicable): No results found for: \"COVID19\"        Anesthesia Evaluation  Patient summary reviewed and Nursing notes reviewed   no history of anesthetic complications:   Airway: Mallampati: III  TM distance: >3 FB   Neck ROM: full  Mouth opening: > = 3 FB   Dental: normal exam         Pulmonary:normal exam  breath sounds clear to

## 2025-07-31 LAB
ANION GAP SERPL CALCULATED.3IONS-SCNC: 12 MMOL/L (ref 9–16)
BASOPHILS # BLD: 0 K/UL (ref 0–0.2)
BASOPHILS NFR BLD: 0 % (ref 0–2)
BUN SERPL-MCNC: 24 MG/DL (ref 6–20)
CALCIUM SERPL-MCNC: 8.7 MG/DL (ref 8.6–10.4)
CHLORIDE SERPL-SCNC: 104 MMOL/L (ref 98–107)
CO2 SERPL-SCNC: 23 MMOL/L (ref 20–31)
CREAT SERPL-MCNC: 0.8 MG/DL (ref 0.7–1.2)
EOSINOPHIL # BLD: 0 K/UL (ref 0–0.4)
EOSINOPHILS RELATIVE PERCENT: 0 % (ref 0–4)
ERYTHROCYTE [DISTWIDTH] IN BLOOD BY AUTOMATED COUNT: 11.8 % (ref 11.5–14.9)
GFR, ESTIMATED: >90 ML/MIN/1.73M2
GLUCOSE BLD-MCNC: 166 MG/DL (ref 75–110)
GLUCOSE BLD-MCNC: 220 MG/DL (ref 75–110)
GLUCOSE BLD-MCNC: 224 MG/DL (ref 75–110)
GLUCOSE BLD-MCNC: 233 MG/DL (ref 75–110)
GLUCOSE SERPL-MCNC: 217 MG/DL (ref 74–99)
HCT VFR BLD AUTO: 41.5 % (ref 41–53)
HGB BLD-MCNC: 14.6 G/DL (ref 13.5–17.5)
IMM GRANULOCYTES # BLD AUTO: 0 K/UL (ref 0–0.3)
IMM GRANULOCYTES NFR BLD: 0 %
LYMPHOCYTES NFR BLD: 0.55 K/UL (ref 1–4.8)
LYMPHOCYTES RELATIVE PERCENT: 5 % (ref 24–44)
MCH RBC QN AUTO: 32.4 PG (ref 26–34)
MCHC RBC AUTO-ENTMCNC: 35.2 G/DL (ref 31–37)
MCV RBC AUTO: 92.2 FL (ref 80–100)
MONOCYTES NFR BLD: 0.55 K/UL (ref 0.1–1.3)
MONOCYTES NFR BLD: 5 % (ref 1–7)
MORPHOLOGY: NORMAL
NEUTROPHILS NFR BLD: 90 % (ref 36–66)
NEUTS SEG NFR BLD: 9.9 K/UL (ref 1.3–9.1)
NRBC BLD-RTO: 0 PER 100 WBC
PLATELET # BLD AUTO: 172 K/UL (ref 150–450)
PMV BLD AUTO: 9.1 FL (ref 8–13.5)
POTASSIUM SERPL-SCNC: 4.6 MMOL/L (ref 3.7–5.3)
RBC # BLD AUTO: 4.5 M/UL (ref 4.21–5.77)
SODIUM SERPL-SCNC: 139 MMOL/L (ref 136–145)
WBC OTHER # BLD: 11 K/UL (ref 3.5–11)

## 2025-07-31 PROCEDURE — 82947 ASSAY GLUCOSE BLOOD QUANT: CPT

## 2025-07-31 PROCEDURE — G0378 HOSPITAL OBSERVATION PER HR: HCPCS

## 2025-07-31 PROCEDURE — 97162 PT EVAL MOD COMPLEX 30 MIN: CPT

## 2025-07-31 PROCEDURE — 36415 COLL VENOUS BLD VENIPUNCTURE: CPT

## 2025-07-31 PROCEDURE — 80048 BASIC METABOLIC PNL TOTAL CA: CPT

## 2025-07-31 PROCEDURE — 6370000000 HC RX 637 (ALT 250 FOR IP): Performed by: ORTHOPAEDIC SURGERY

## 2025-07-31 PROCEDURE — 97166 OT EVAL MOD COMPLEX 45 MIN: CPT

## 2025-07-31 PROCEDURE — 2500000003 HC RX 250 WO HCPCS: Performed by: ORTHOPAEDIC SURGERY

## 2025-07-31 PROCEDURE — 85025 COMPLETE CBC W/AUTO DIFF WBC: CPT

## 2025-07-31 PROCEDURE — 96374 THER/PROPH/DIAG INJ IV PUSH: CPT

## 2025-07-31 PROCEDURE — 96376 TX/PRO/DX INJ SAME DRUG ADON: CPT

## 2025-07-31 PROCEDURE — 6360000002 HC RX W HCPCS: Performed by: ORTHOPAEDIC SURGERY

## 2025-07-31 PROCEDURE — 97530 THERAPEUTIC ACTIVITIES: CPT

## 2025-07-31 PROCEDURE — 6370000000 HC RX 637 (ALT 250 FOR IP)

## 2025-07-31 PROCEDURE — 99223 1ST HOSP IP/OBS HIGH 75: CPT

## 2025-07-31 RX ORDER — INSULIN LISPRO 100 [IU]/ML
0-8 INJECTION, SOLUTION INTRAVENOUS; SUBCUTANEOUS
Status: DISCONTINUED | OUTPATIENT
Start: 2025-07-31 | End: 2025-08-01 | Stop reason: HOSPADM

## 2025-07-31 RX ORDER — DEXTROSE MONOHYDRATE 100 MG/ML
INJECTION, SOLUTION INTRAVENOUS CONTINUOUS PRN
Status: DISCONTINUED | OUTPATIENT
Start: 2025-07-31 | End: 2025-08-01 | Stop reason: HOSPADM

## 2025-07-31 RX ORDER — OXYCODONE AND ACETAMINOPHEN 5; 325 MG/1; MG/1
1 TABLET ORAL EVERY 6 HOURS PRN
Qty: 28 TABLET | Refills: 0 | Status: SHIPPED | OUTPATIENT
Start: 2025-07-31 | End: 2025-08-07

## 2025-07-31 RX ADMIN — Medication 2000 MG: at 03:59

## 2025-07-31 RX ADMIN — SODIUM CHLORIDE, PRESERVATIVE FREE 10 ML: 5 INJECTION INTRAVENOUS at 21:25

## 2025-07-31 RX ADMIN — GABAPENTIN 800 MG: 400 CAPSULE ORAL at 08:11

## 2025-07-31 RX ADMIN — MORPHINE SULFATE 4 MG: 4 INJECTION, SOLUTION INTRAMUSCULAR; INTRAVENOUS at 00:23

## 2025-07-31 RX ADMIN — MORPHINE SULFATE 4 MG: 4 INJECTION, SOLUTION INTRAMUSCULAR; INTRAVENOUS at 14:53

## 2025-07-31 RX ADMIN — GABAPENTIN 800 MG: 400 CAPSULE ORAL at 21:22

## 2025-07-31 RX ADMIN — OXYBUTYNIN CHLORIDE 10 MG: 10 TABLET, EXTENDED RELEASE ORAL at 08:11

## 2025-07-31 RX ADMIN — INSULIN LISPRO 2 UNITS: 100 INJECTION, SOLUTION INTRAVENOUS; SUBCUTANEOUS at 12:32

## 2025-07-31 RX ADMIN — DULOXETINE 60 MG: 60 CAPSULE, DELAYED RELEASE ORAL at 08:11

## 2025-07-31 RX ADMIN — INSULIN LISPRO 2 UNITS: 100 INJECTION, SOLUTION INTRAVENOUS; SUBCUTANEOUS at 16:52

## 2025-07-31 RX ADMIN — OXYCODONE HYDROCHLORIDE 10 MG: 10 TABLET ORAL at 12:32

## 2025-07-31 RX ADMIN — OXYCODONE HYDROCHLORIDE 10 MG: 10 TABLET ORAL at 23:32

## 2025-07-31 RX ADMIN — GABAPENTIN 800 MG: 400 CAPSULE ORAL at 12:32

## 2025-07-31 RX ADMIN — INSULIN LISPRO 2 UNITS: 100 INJECTION, SOLUTION INTRAVENOUS; SUBCUTANEOUS at 21:22

## 2025-07-31 RX ADMIN — OXYCODONE HYDROCHLORIDE 10 MG: 10 TABLET ORAL at 04:00

## 2025-07-31 RX ADMIN — ACETAMINOPHEN 650 MG: 325 TABLET ORAL at 23:32

## 2025-07-31 RX ADMIN — SODIUM CHLORIDE, PRESERVATIVE FREE 10 ML: 5 INJECTION INTRAVENOUS at 08:13

## 2025-07-31 RX ADMIN — EMPAGLIFLOZIN 25 MG: 10 TABLET, FILM COATED ORAL at 08:17

## 2025-07-31 RX ADMIN — ACETAMINOPHEN 650 MG: 325 TABLET ORAL at 16:52

## 2025-07-31 RX ADMIN — ACETAMINOPHEN 650 MG: 325 TABLET ORAL at 11:05

## 2025-07-31 RX ADMIN — OXYCODONE HYDROCHLORIDE 10 MG: 10 TABLET ORAL at 08:11

## 2025-07-31 RX ADMIN — ATORVASTATIN CALCIUM 40 MG: 40 TABLET, FILM COATED ORAL at 08:11

## 2025-07-31 RX ADMIN — ACETAMINOPHEN 650 MG: 325 TABLET ORAL at 04:00

## 2025-07-31 ASSESSMENT — PAIN DESCRIPTION - ORIENTATION
ORIENTATION: LOWER;MID
ORIENTATION: MID
ORIENTATION: LOWER;MID;POSTERIOR
ORIENTATION: LOWER;MID;POSTERIOR
ORIENTATION: MID
ORIENTATION: MID

## 2025-07-31 ASSESSMENT — PAIN DESCRIPTION - DESCRIPTORS
DESCRIPTORS: DISCOMFORT
DESCRIPTORS: DISCOMFORT;SORE
DESCRIPTORS: SORE;BURNING
DESCRIPTORS: ACHING

## 2025-07-31 ASSESSMENT — PAIN SCALES - GENERAL
PAINLEVEL_OUTOF10: 5
PAINLEVEL_OUTOF10: 2
PAINLEVEL_OUTOF10: 7
PAINLEVEL_OUTOF10: 7
PAINLEVEL_OUTOF10: 8
PAINLEVEL_OUTOF10: 7
PAINLEVEL_OUTOF10: 2
PAINLEVEL_OUTOF10: 7
PAINLEVEL_OUTOF10: 2
PAINLEVEL_OUTOF10: 0
PAINLEVEL_OUTOF10: 7
PAINLEVEL_OUTOF10: 5
PAINLEVEL_OUTOF10: 0
PAINLEVEL_OUTOF10: 2
PAINLEVEL_OUTOF10: 2

## 2025-07-31 ASSESSMENT — PAIN DESCRIPTION - LOCATION
LOCATION: BACK

## 2025-07-31 ASSESSMENT — PAIN - FUNCTIONAL ASSESSMENT
PAIN_FUNCTIONAL_ASSESSMENT: ACTIVITIES ARE NOT PREVENTED
PAIN_FUNCTIONAL_ASSESSMENT: PREVENTS OR INTERFERES SOME ACTIVE ACTIVITIES AND ADLS
PAIN_FUNCTIONAL_ASSESSMENT: ACTIVITIES ARE NOT PREVENTED

## 2025-07-31 ASSESSMENT — PAIN DESCRIPTION - FREQUENCY
FREQUENCY: CONTINUOUS
FREQUENCY: CONTINUOUS

## 2025-07-31 ASSESSMENT — PAIN DESCRIPTION - PAIN TYPE
TYPE: SURGICAL PAIN
TYPE: SURGICAL PAIN

## 2025-07-31 ASSESSMENT — PAIN DESCRIPTION - ONSET
ONSET: ON-GOING
ONSET: ON-GOING

## 2025-07-31 NOTE — PROGRESS NOTES
[facility]   Occupational Therapy Evaluation  Date: 25  Patient Name: Bandar Willard       Room:   MRN: [id number]  Account: [id number]   : 1965  (59 y.o.) Gender: male     Discharge Recommendations:  Further Occupational Therapy is recommended upon facility discharge.    OT Equipment Recommendations  Other: TBD    Referring Practitioner: Turner Rojas MD  Diagnosis: Lumbar stenosis with neurogenic claudication; Lumbar foraminal stenosis     Treatment Diagnosis: Impaired self care status    Past Medical History:  has a past medical history of Chronic cellulitis, Diabetes mellitus (HCC), Guillain Barré syndrome, Hyperlipidemia, Hypertension, Spinal stenosis of lumbar region with neurogenic claudication, and Ventral hernia without obstruction or gangrene.    Past Surgical History:   has a past surgical history that includes Colonoscopy and Lumbar spine surgery (N/A, 2025).    Restrictions  Restrictions/Precautions  Restrictions/Precautions: General Precautions  Activity Level: Up as Tolerated, Up with Assist  Required Braces or Orthoses?: No  Position Activity Restriction  Other Position/Activity Restrictions: avoid excessive Bending/lifting twisting at spine for pain management      Vitals  Vitals  O2 Device: None (Room air)  BP Location: Left upper arm     Subjective  Subjective: \"This all started in April\" pt pleasant and agreeable to OT/PT eval  Comments: Okay for OT/PT per LYUDMILA Brown  Pain  Pre-Pain: 4  Post-Pain: 5    Social/Functional History  Social/Functional History  Lives With: Spouse, Daughter  Type of Home: House  Home Layout: One level  Home Access: Stairs to enter without rails  Entrance Stairs - Number of Steps: 2 small 2-3 inch steps  Bathroom Shower/Tub: Tub/Shower unit, Curtain (Step stool with Rail)  Bathroom Toilet: Handicap height (Sink close by)  Bathroom Equipment: Grab bars in shower, Shower chair, Hand-held shower  Bathroom Accessibility:  Wheelchair accessible  Home Equipment: Wheelchair - Manual, Walker - Rolling, Rollator (CPAP)  Has the patient had two or more falls in the past year or any fall with injury in the past year?: Yes (3 at work and 2 at home during symtpom onset of guillan barre)  Receives Help From: Family  Prior Level of Assist for ADLs:  (Prior to April 2025 INDEPENDENT; now needs bathing assist)  Prior Level of Assist for Homemaking: Needs assistance (shares duties with Wife)  Homemaking Responsibilities: Yes  Prior Level of Assist for Ambulation: Independent in home with wheelchair and able to pivot transfer, Independent household ambulator, with or without device (Ambulates independently, short household distances with RW. Pt uses WC in home/in community)  Prior Level of Assist for Transfers: Independent (May use RW or No AD)  Active : No  Occupation: On disability (Was Full Time employed prior to April)  Type of Occupation: Building supply   IADL Comments: pt sleeps in flat bed  Additional Comments: Daughter and spouse work Full Time; Dtr works 3 12 hour shifts    Objective  ADL  Feeding: Independent, Based on clinical judgement  Grooming: Setup, Based on clinical judgement  UE Bathing: Stand by assistance, Based on clinical judgement  LE Bathing: Minimal assistance, Based on clinical judgement  UE Dressing: Stand by assistance, Based on clinical judgement  LE Dressing: Minimal assistance, Based on clinical judgement  Putting On/Taking Off Footwear: Contact guard assistance  Putting On/Taking Off Footwear Skilled Clinical Factors: CGA seated at EOB for pt to lean forward to don shoes  Toileting: Contact guard assistance  Additional Comments: ADL scores are based on skilled observation unless otherwise noted. Pt is currently limited by decreased strength, endurance, activity tolerance, impaired balance and general weakness which impacts the pt's ability to safely and independently complete self care

## 2025-07-31 NOTE — CONSULTS
IN-PATIENT SERVICE  [facility]    CONSULTATION / HISTORY AND PHYSICAL EXAMINATION            Date:   7/31/2025  Patient name:  Bandar Willard  Date of admission:  7/30/2025  9:39 AM  MRN:   [id number]  Account:  [id number]  YOB: 1965  PCP:    Elif Ortez APRN - CNP  Room:   2040/2040-01  Code Status:    Full Code    Physician Requesting Consult: Turner Rojas MD    Reason for Consult:  medical management    Chief Complaint:     No chief complaint on file.      History Obtained From:     Patient medical record nursing staff    History of Present Illness:     59-year-old male with past medical history of type 2 diabetes mellitus, hypertension, hyperlipidemia, recent history of GBS, diabetic polyneuropathy lumbar spinal stenosis presented to the hospital for elective lumbar laminectomy with decompression.  Patient underwent lumbar decompression L3-L4, L4-L5 and L5-S1 with laminotomies, bilateral partial medial fasciectomy's and neural foraminotomies.  Patient progressing well.  Blood pressure has been fairly under well-controlled.      Past Medical History:     Past Medical History:   Diagnosis Date    Chronic cellulitis     Diabetes mellitus (HCC)     Guillain Barré syndrome 04/10/2025    Sees Dr. Damian / Neurology    Hyperlipidemia     Hypertension     Spinal stenosis of lumbar region with neurogenic claudication     Ventral hernia without obstruction or gangrene         Past Surgical History:     Past Surgical History:   Procedure Laterality Date    COLONOSCOPY      LUMBAR SPINE SURGERY N/A 7/30/2025    LUMBAR LAMINECTOMY DECOMPRESSION POSTERIOR L3-S1 performed by Turner Rojas MD at [facility] OR        Medications Prior to Admission:     Prior to Admission medications    Medication Sig Start Date End Date Taking? Authorizing Provider   clobetasol (TEMOVATE) 0.05 % cream Apply to affected areas twice daily, as needed.  Do NOT use on face, groin, or armpits.  increase in urination, hot or cold intolerance  MUSCULOSKELETAL:  negative joint pains, muscle aches, swelling of joints  NEUROLOGICAL:  negative for headaches, dizziness, lightheadedness, numbness, pain, tingling extremities  BEHAVIOR/PSYCH:      Physical Exam:     /75   Pulse 92   Temp 97.9 °F (36.6 °C) (Oral)   Resp 16   Ht 1.778 m (5' 10\")   Wt 106.6 kg (235 lb)   SpO2 98%   BMI 33.72 kg/m²   Temp (24hrs), Av.8 °F (36.6 °C), Min:97.3 °F (36.3 °C), Max:98.2 °F (36.8 °C)    Recent Labs     25  1027 25  0627   POCGLU 156* 253* 166*       Intake/Output Summary (Last 24 hours) at 2025 0719  Last data filed at 2025 0348  Gross per 24 hour   Intake 1440 ml   Output 1950 ml   Net -510 ml       General Appearance:  alert, well appearing, and in no acute distress  Mental status: oriented to person, place, and time with normal affect  Head:  normocephalic, atraumatic.  Eye: no icterus, redness, pupils equal and reactive, extraocular eye movements intact, conjunctiva clear  Ear: normal external ear, no discharge, hearing intact  Nose:  no drainage noted  Mouth: mucous membranes moist  Neck: supple, no carotid bruits, thyroid not palpable  Lungs: Bilateral equal air entry, clear to ausculation, no wheezing, rales or rhonchi, normal effort  Cardiovascular: normal rate, regular rhythm, no murmur, gallop, rub.  Abdomen: Soft, nontender, nondistended, normal bowel sounds, no hepatomegaly or splenomegaly  Neurologic: There are no new focal motor or sensory deficits, normal muscle tone and bulk, no abnormal sensation, normal speech, cranial nerves II through XII grossly intact  Skin: No gross lesions, rashes, bruising or bleeding on exposed skin area  Extremities:  peripheral pulses palpable, no pedal edema or calf pain with palpation  Psych:     Investigations:      Laboratory Testing:  Recent Results (from the past 24 hours)   POC Glucose Fingerstick    Collection Time:

## 2025-07-31 NOTE — CARE COORDINATION
to current housing: Pain.  Potential Assistance needed at discharge: Outpatient PT/OT            Potential DME:    Patient expects to discharge to: House  Plan for transportation at discharge: Family    Financial    Payor: [employer] / Plan: [employer] OPEN ACCESS PLUS (OAP) / Product Type: *No Product type* /     Does insurance require precert for SNF: Yes    Potential assistance Purchasing Medications: No  Meds-to-Beds request: Yes (Haos5Greq chosen pre-op 7/30/25)      [facility] [id number] - [place], [place] - [address] Kathy MICHEL [phone] - F [phone]  [address] [place] [address]  Phone: [phone] Fax: [phone]      Notes:    Factors facilitating achievement of predicted outcomes: Family support, Motivated, Cooperative, and Pleasant    Barriers to discharge: Pain and Upper extremity weakness and Lower extremity weakness.    Additional Case Management Notes: The patient is from a 1 story home with spouse, independent, spouse transports.    DME: Wheelchair, rollator walker, Cpap, cane, GB, SC, glucometer.    VNS: None.    POD #1 lumbar laminectomy L5-S1. PT/OT ordered.    Patient would be interested in outpatient physical therapy. Would need external referral for PT prior to discharge.    The Plan for Transition of Care is related to the following treatment goals of Lumbar stenosis with neurogenic claudication [M48.062]  Lumbar foraminal stenosis [M48.061]    IF APPLICABLE: The Patient and/or patient representative Bandar and his family were provided with a choice of provider and agrees with the discharge plan. Freedom of choice list with basic dialogue that supports the patient's individualized plan of care/goals and shares the quality data associated with the providers was provided to: Patient   Patient Representative Name:       The Patient and/or Patient Representative Agree with the Discharge Plan? Yes    Niyah Martins RN  Case Management Department  Ph: [phone] Fax:  [phone]

## 2025-07-31 NOTE — DISCHARGE INSTR - COC
Continuity of Care Form    Patient Name: Bandar Willard   :  1965  MRN:  [id number]    Admit date:  2025  Discharge date:  ***    Code Status Order: Full Code   Advance Directives:    Date/Time Healthcare Directive Type of Healthcare Directive Copy in Chart Healthcare Agent Appointed Healthcare Agent's Name Healthcare Agent's Phone Number    25 1000 No, patient does not have an advance directive for healthcare treatment  --  --  --  --  --             Admitting Physician:  Turner Rojas MD  PCP: Elif Ortez, APRN - CNP    Discharging Nurse: ***  Discharging Hospital Unit/Room#:   Discharging Unit Phone Number: ***    Emergency Contact:   Extended Emergency Contact Information  Primary Emergency Contact: Vickie Willard  Home Phone: [phone]  Relation: Spouse    Past Surgical History:  Past Surgical History:   Procedure Laterality Date    COLONOSCOPY      LUMBAR SPINE SURGERY N/A 2025    LUMBAR LAMINECTOMY DECOMPRESSION POSTERIOR L3-S1 performed by Turner Rojas MD at [facility] OR       Immunization History:   Immunization History   Administered Date(s) Administered    Influenza, FLUARIX, FLULAVAL, FLUZONE (age 6 mo+) and AFLURIA, (age 3 y+), Quadv PF, 0.5mL 2020       Active Problems:  Patient Active Problem List   Diagnosis Code    Cellulitis L03.90    Hyperlipidemia E78.5    Mixed hyperlipidemia E78.2    Primary hypertension I10    Type 2 diabetes mellitus with hyperglycemia ([facility]) E11.65    Class 3 severe obesity with serious comorbidity and body mass index (BMI) of 45.0 to 49.9 in adult ([facility]) E66.813, Z68.42    Obstructive sleep apnea on CPAP G47.33    Bilateral leg weakness R29.898    Falls R29.6    Diabetic neuropathy ([facility]) E11.40    Spinal stenosis in cervical region M48.02    Spinal stenosis, lumbar region, without neurogenic claudication M48.061    CSF abnormal R83.9    Lumbar stenosis with neurogenic claudication M48.062    Lumbar foraminal stenosis M48.061  Therapy:   { WEI Diet List:511747708}    Routes of Feeding: {CHP DME Other Feedings:041027741}  Liquids: {Slp liquid thickness:34642}  Daily Fluid Restriction: {CHP DME Yes amt example:409195188}  Last Modified Barium Swallow with Video (Video Swallowing Test): {Done Not Done Date:}    Treatments at the Time of Hospital Discharge:   Respiratory Treatments: ***  Oxygen Therapy:  {Therapy; copd oxygen:31122}  Ventilator:    { CC Vent List:412697601}    Rehab Therapies: {THERAPEUTIC INTERVENTION:8580839677}  Weight Bearing Status/Restrictions: { CC Weight Bearin}  Other Medical Equipment (for information only, NOT a DME order):  {EQUIPMENT:455388701}  Other Treatments: ***    Patient's personal belongings (please select all that are sent with patient):  {[facility] DME Belongings:944118526}    RN SIGNATURE:  {Esignature:305746519}    CASE MANAGEMENT/SOCIAL WORK SECTION    Inpatient Status Date: ***    Readmission Risk Assessment Score:  [facility] RISK OF UNPLANNED READMISSION 2.0             0 Total Score        Discharging to Facility/ Agency   Name:   Address:  Phone:  Fax:    Dialysis Facility (if applicable)   Name:  Address:  Dialysis Schedule:  Phone:  Fax:    / signature: {Esignature:708527589}    PHYSICIAN SECTION    Prognosis: Fair    Condition at Discharge: Stable    Rehab Potential (if transferring to Rehab): Fair    Recommended Labs or Other Treatments After Discharge: na    Physician Certification: I certify the above information and transfer of Bandar Willard  is necessary for the continuing treatment of the diagnosis listed and that he requires {Admit to Appropriate Level of Care:95809} for {GREATER/LESS:442956802} 30 days.     Update Admission H&P: No change in H&P    PHYSICIAN SIGNATURE:  Electronically signed by Turner Rojas MD on 25 at 1:45 PM EDT

## 2025-07-31 NOTE — PLAN OF CARE
Problem: Discharge Planning  Goal: Discharge to home or other facility with appropriate resources  Flowsheets (Taken 7/31/2025 1630)  Discharge to home or other facility with appropriate resources:   Arrange for needed discharge resources and transportation as appropriate   Identify barriers to discharge with patient and caregiver   Identify discharge learning needs (meds, wound care, etc)   Refer to discharge planning if patient needs post-hospital services based on physician order or complex needs related to functional status, cognitive ability or social support system   Arrange for interpreters to assist at discharge as needed     Problem: Chronic Conditions and Co-morbidities  Goal: Patient's chronic conditions and co-morbidity symptoms are monitored and maintained or improved  Flowsheets (Taken 7/31/2025 1630)  Care Plan - Patient's Chronic Conditions and Co-Morbidity Symptoms are Monitored and Maintained or Improved:   Monitor and assess patient's chronic conditions and comorbid symptoms for stability, deterioration, or improvement   Collaborate with multidisciplinary team to address chronic and comorbid conditions and prevent exacerbation or deterioration   Update acute care plan with appropriate goals if chronic or comorbid symptoms are exacerbated and prevent overall improvement and discharge

## 2025-07-31 NOTE — PROGRESS NOTES
Physical Therapy  [facility]   Physical Therapy Evaluation  Date: 25  Patient Name: Bandar Willard       Room:   MRN: [id number]  Account: [id number]   : 1965  (59 y.o.) Gender: male     Discharge Recommendations:  Discharge Recommendations: Patient would benefit from continued therapy after discharge     PT D/C Equipment  Equipment Needed: No (pt has necessary equipment at home)  PT Equipment Recommendations  Equipment Needed: No     Past Medical History:  has a past medical history of Chronic cellulitis, Diabetes mellitus (HCC), Guillain Barré syndrome, Hyperlipidemia, Hypertension, Spinal stenosis of lumbar region with neurogenic claudication, and Ventral hernia without obstruction or gangrene.  Past Surgical History:   has a past surgical history that includes Colonoscopy and Lumbar spine surgery (N/A, 2025).    Subjective  Subjective  Subjective: Patient reports Guillan [place] syndrome onset began 2025 and has been recovering since; participating in Aquatic and now recently land-based therapy at OP clinic. Pt states ambition to eventually return to work in near future.     General  Chart Reviewed: Yes  Patient assessed for rehabilitation services?: Yes  Additional Pertinent Hx: Lumbar decompression L3-4, L4-5, L5-S1 with laminotomies, bilateral partial medial facetectomies, neural foraminotomies  Response To Previous Treatment: Not applicable  Family/Caregiver Present: Yes (Spouse)  Referring Practitioner: Turner Rojas MD  Referral Date : 25  Diagnosis: Lumbar stenosis with neurogenic claudication  Follows Commands: Within Functional Limits     Pain  Pre-Pain: 4  Post-Pain: 5  Pain Location: Back     Social/Functional History  Social/Functional History  Lives With: Spouse, Daughter  Type of Home: House  Home Layout: One level  Home Access: Stairs to enter without rails  Entrance Stairs - Number of Steps: 2 small 2-3 inch steps  Bathroom Shower/Tub:  pain  Treatment Diagnosis: Impaired mobility  Therapy Prognosis: Good  Decision Making: Medium Complexity  History: Guillan [place] diagnosis in April 2025  Exam: ROM, MMT, Balance, and functional mobility assessments  Treatment Initiated : Functional mobility  Discharge Recommendations: Patient would benefit from continued therapy after discharge  Conditions Requiring Skilled Therapeutic Intervention  Assessment: Pt demonstrates increased need for assist with mobility due to LE weakness, pain, and balance deficits s/p Lumbar laminectomy. Pt could benefit from skilled therapy services to facilitate increased independence and address deficits.  Treatment Diagnosis: Impaired mobility  Therapy Prognosis: Good  Decision Making: Medium Complexity  History: Guillan [place] diagnosis in April 2025  Exam: ROM, MMT, Balance, and functional mobility assessments  Treatment Initiated : Functional mobility  Discharge Recommendations: Patient would benefit from continued therapy after discharge  Activity Tolerance  Activity Tolerance: Patient limited by pain     Patient Education  Patient Education  Education Given To: Patient  Education Provided: Role of Therapy, Plan of Care, Precautions, Transfer Training, Mobility Training, Equipment  Education Provided Comments: Spinal protection  Education Method: Demonstration, Verbal  Barriers to Learning: None     Functional Outcome Measures  AM-PAC Basic Mobility - Inpatient   How much help is needed turning from your back to your side while in a flat bed without using bedrails?: A Little  How much help is needed moving from lying on your back to sitting on the side of a flat bed without using bedrails?: A Little  How much help is needed moving to and from a bed to a chair?: A Little  How much help is needed standing up from a chair using your arms?: A Little  How much help is needed walking in hospital room?: A Little  How much help is needed climbing 3-5 steps with a railing?: A

## 2025-07-31 NOTE — PROGRESS NOTES
Surgical Progress Note    POD: 1    Patient doing fairly well  Vitals:    25 1200   BP: 113/73   Pulse: 90   Resp: 16   Temp: 98.1 °F (36.7 °C)   SpO2: 97%      Temp (24hrs), Av.8 °F (36.6 °C), Min:97.4 °F (36.3 °C), Max:98.2 °F (36.8 °C)       Pain Control fair  No unusual nausea    Exam: legs seem to feel subtle better          Lungs:  No respiratory distress    Labs reviewed:  Labs:  WBC/Hgb/Hct/Plts:  11.0/14.6/41.5/172 (823)  BUN/Cr/glu/ALT/AST/amyl/lip:  24/0.8/--/--/--/--/-- (823)     Na/K/Cl/CO2:  139/4.6/104/23 (823)    I/O last 3 completed shifts:  In: 1440 [P.O.:240; I.V.:1200]  Out: 1950 [Urine:1800; Blood:150]    Assessment:    Patient Active Problem List   Diagnosis    Cellulitis    Hyperlipidemia    Mixed hyperlipidemia    Primary hypertension    Type 2 diabetes mellitus with hyperglycemia (HCC)    Class 3 severe obesity with serious comorbidity and body mass index (BMI) of 45.0 to 49.9 in adult (HCC)    Obstructive sleep apnea on CPAP    Bilateral leg weakness    Falls    Diabetic neuropathy (HCC)    Spinal stenosis in cervical region    Spinal stenosis, lumbar region, without neurogenic claudication    CSF abnormal    Lumbar stenosis with neurogenic claudication    Lumbar foraminal stenosis       Plan:  severely debilitated pt with multiple co-morbidities not ready of dc today plan tomorrow    Turner Rojas MD MD  2025 1:43 PM

## 2025-07-31 NOTE — PLAN OF CARE
Problem: Discharge Planning  Goal: Discharge to home or other facility with appropriate resources  7/31/2025 0136 by Mili Moore RN  Outcome: Progressing  Flowsheets (Taken 7/30/2025 2015)  Discharge to home or other facility with appropriate resources: Identify barriers to discharge with patient and caregiver  7/30/2025 1759 by Genesis Garza RN  Outcome: Progressing     Problem: Chronic Conditions and Co-morbidities  Goal: Patient's chronic conditions and co-morbidity symptoms are monitored and maintained or improved  7/31/2025 0136 by Mili Moore RN  Outcome: Progressing  Flowsheets (Taken 7/30/2025 2015)  Care Plan - Patient's Chronic Conditions and Co-Morbidity Symptoms are Monitored and Maintained or Improved: Monitor and assess patient's chronic conditions and comorbid symptoms for stability, deterioration, or improvement  7/30/2025 1759 by Genesis Garza RN  Outcome: Progressing     Problem: Pain  Goal: Verbalizes/displays adequate comfort level or baseline comfort level  7/31/2025 0136 by Mili Moore RN  Outcome: Progressing  Flowsheets (Taken 7/30/2025 2011)  Verbalizes/displays adequate comfort level or baseline comfort level: Encourage patient to monitor pain and request assistance  7/30/2025 1759 by Genesis Garza RN  Outcome: Progressing     Problem: Skin/Tissue Integrity  Goal: Skin integrity remains intact  Description: 1.  Monitor for areas of redness and/or skin breakdown  2.  Assess vascular access sites hourly  3.  Every 4-6 hours minimum:  Change oxygen saturation probe site  4.  Every 4-6 hours:  If on nasal continuous positive airway pressure, respiratory therapy assess nares and determine need for appliance change or resting period  7/31/2025 0136 by Mili Moore RN  Outcome: Progressing  Flowsheets (Taken 7/30/2025 2015)  Skin Integrity Remains Intact: Monitor for areas of redness and/or skin breakdown  7/30/2025 1759 by Genesis Garza RN  Outcome: Progressing

## 2025-07-31 NOTE — ANESTHESIA POSTPROCEDURE EVALUATION
Department of Anesthesiology  Postprocedure Note    Patient: Bandar Willard  MRN: [id number]  YOB: 1965  Date of evaluation: 7/31/2025    Procedure Summary       Date: 07/30/25 Room / Location: [address]    Anesthesia Start: 1134 Anesthesia Stop: 1428    Procedure: LUMBAR LAMINECTOMY DECOMPRESSION POSTERIOR L3-S1 (Spine Lumbar) Diagnosis:       Lumbar stenosis with neurogenic claudication      (Lumbar stenosis with neurogenic claudication [M48.062])    Surgeons: Turner Rojas MD Responsible Provider: Carina Woodall MD    Anesthesia Type: general ASA Status: 3            Anesthesia Type: No value filed.    Ian Phase I: Ian Score: 10    Ian Phase II:      Anesthesia Post Evaluation    Comments: POD #1 Patient seen lying in bed. Denied any anesthesia related issues.    No notable events documented.

## 2025-08-01 VITALS
HEIGHT: 70 IN | OXYGEN SATURATION: 96 % | SYSTOLIC BLOOD PRESSURE: 116 MMHG | RESPIRATION RATE: 18 BRPM | HEART RATE: 74 BPM | WEIGHT: 235 LBS | DIASTOLIC BLOOD PRESSURE: 73 MMHG | BODY MASS INDEX: 33.64 KG/M2 | TEMPERATURE: 97.7 F

## 2025-08-01 LAB — GLUCOSE BLD-MCNC: 211 MG/DL (ref 75–110)

## 2025-08-01 PROCEDURE — 97530 THERAPEUTIC ACTIVITIES: CPT

## 2025-08-01 PROCEDURE — 2500000003 HC RX 250 WO HCPCS: Performed by: ORTHOPAEDIC SURGERY

## 2025-08-01 PROCEDURE — G0378 HOSPITAL OBSERVATION PER HR: HCPCS

## 2025-08-01 PROCEDURE — 99232 SBSQ HOSP IP/OBS MODERATE 35: CPT | Performed by: INTERNAL MEDICINE

## 2025-08-01 PROCEDURE — 6370000000 HC RX 637 (ALT 250 FOR IP): Performed by: ORTHOPAEDIC SURGERY

## 2025-08-01 PROCEDURE — 82947 ASSAY GLUCOSE BLOOD QUANT: CPT

## 2025-08-01 PROCEDURE — 6370000000 HC RX 637 (ALT 250 FOR IP)

## 2025-08-01 RX ADMIN — GABAPENTIN 800 MG: 400 CAPSULE ORAL at 09:55

## 2025-08-01 RX ADMIN — ATORVASTATIN CALCIUM 40 MG: 40 TABLET, FILM COATED ORAL at 09:56

## 2025-08-01 RX ADMIN — SODIUM CHLORIDE, PRESERVATIVE FREE 10 ML: 5 INJECTION INTRAVENOUS at 10:02

## 2025-08-01 RX ADMIN — DULOXETINE 60 MG: 60 CAPSULE, DELAYED RELEASE ORAL at 09:55

## 2025-08-01 RX ADMIN — INSULIN LISPRO 2 UNITS: 100 INJECTION, SOLUTION INTRAVENOUS; SUBCUTANEOUS at 06:39

## 2025-08-01 RX ADMIN — OXYCODONE HYDROCHLORIDE 10 MG: 10 TABLET ORAL at 10:02

## 2025-08-01 RX ADMIN — ACETAMINOPHEN 650 MG: 325 TABLET ORAL at 18:00

## 2025-08-01 RX ADMIN — OXYCODONE HYDROCHLORIDE 5 MG: 5 TABLET ORAL at 14:28

## 2025-08-01 RX ADMIN — ACETAMINOPHEN 650 MG: 325 TABLET ORAL at 06:05

## 2025-08-01 RX ADMIN — ACETAMINOPHEN 650 MG: 325 TABLET ORAL at 12:07

## 2025-08-01 RX ADMIN — OXYCODONE HYDROCHLORIDE 10 MG: 10 TABLET ORAL at 06:04

## 2025-08-01 RX ADMIN — OXYBUTYNIN CHLORIDE 10 MG: 10 TABLET, EXTENDED RELEASE ORAL at 09:56

## 2025-08-01 RX ADMIN — EMPAGLIFLOZIN 25 MG: 10 TABLET, FILM COATED ORAL at 10:04

## 2025-08-01 RX ADMIN — GABAPENTIN 800 MG: 400 CAPSULE ORAL at 14:20

## 2025-08-01 ASSESSMENT — PAIN SCALES - GENERAL
PAINLEVEL_OUTOF10: 7
PAINLEVEL_OUTOF10: 6
PAINLEVEL_OUTOF10: 8
PAINLEVEL_OUTOF10: 4
PAINLEVEL_OUTOF10: 7
PAINLEVEL_OUTOF10: 6

## 2025-08-01 ASSESSMENT — PAIN DESCRIPTION - LOCATION
LOCATION: COCCYX
LOCATION: BACK
LOCATION: BACK;BUTTOCKS
LOCATION: BACK;LEG

## 2025-08-01 ASSESSMENT — PAIN DESCRIPTION - DESCRIPTORS
DESCRIPTORS: THROBBING
DESCRIPTORS: ACHING;NUMBNESS
DESCRIPTORS: TINGLING;NUMBNESS;SHOOTING

## 2025-08-01 ASSESSMENT — PAIN DESCRIPTION - ORIENTATION
ORIENTATION: LOWER;RIGHT;LEFT
ORIENTATION: LOWER
ORIENTATION: LOWER

## 2025-08-01 ASSESSMENT — PAIN - FUNCTIONAL ASSESSMENT: PAIN_FUNCTIONAL_ASSESSMENT: ACTIVITIES ARE NOT PREVENTED

## 2025-08-01 NOTE — PROGRESS NOTES
[facility]   INPATIENT OCCUPATIONAL THERAPY  PROGRESS NOTE  Date: 2025  Patient Name: Bandar Willard       Room:   MRN: [id number]    : 1965  (59 y.o.)  Gender: male   Referring Practitioner: Tunrer Rojas MD  Diagnosis: Lumbar stenosis with neurogenic claudication; Lumbar foraminal stenosis      Discharge Recommendations:  Further Occupational Therapy is recommended upon facility discharge.    OT Equipment Recommendations  Other: TBD    Restrictions/Precautions  Restrictions/Precautions  Restrictions/Precautions: General Precautions  Activity Level: Up as Tolerated;Up with Assist  Required Braces or Orthoses?: No  Position Activity Restriction  Other Position/Activity Restrictions: avoid excessive Bending/lifting twisting at spine for pain management    Vitals  O2 Device: None (Room air)  BP Location: Left upper arm    Subjective  Subjective  Subjective: \"I want to go home\" pt agreeable to short walk but declines additional tasks/activity repeating \"I just want to go home\"  Pain  Pre-Pain: 7  Pain Location: Back  Pain Interventions: Rest  Comments: RN Niyah/Ana Rosa barkley for therapy, pt agreeable    Objective  Orientation  Overall Orientation Status: Within Functional Limits  Cognition  Overall Cognitive Status: WFL  Patient affect:: Normal    Activities of Daily Living       Balance  Balance  Sitting Balance: Modified independent  (seated in w/c)  Standing Balance: Contact guard assistance  Standing Balance  Time: 2-3 min  Activity: Functional transfers/mobility  Comment: BUE supported on RW    Transfers/Mobility  Transfers  Sit to stand: Contact guard assistance  Stand to sit: Contact guard assistance  Transfer Comments: Good hand placement noted    Functional Mobility  Functional - Mobility Device: Rolling Walker  Activity: Other (room mobility)  Assist Level: Contact guard assistance  Functional Mobility Comments: CGA for safety with RW for short distance, unsteady with  observed buckling in BLE's    OT Exercises  Exercise Treatment: declines  Dynamic Standing Balance Exercises: declines    Patient Education  Patient Education  Education Given To: Patient  Education Provided: Plan of Care, Precautions, Transfer Training, Mobility Training  Education Provided Comments: OT role, OT POC, pain mgmt  Education Method: Verbal  Barriers to Learning: None  Education Outcome: Verbalized understanding, Demonstrated understanding, Continued education needed    Goals  Short Term Goals  Time Frame for Short Term Goals: By discharge  Short Term Goal 1: Pt will complete BADLs with Mod I and good safety with use of AE/DME/Modified techniques as needed  Short Term Goal 2: Pt will complete functional transfers/short mobility with SBA and good safety with use of RW  Short Term Goal 3: Pt will actively participate in 15+ minutes of therapeutic exercise/functional activity to improve safety and independence with self care tasks  Short Term Goal 4: Pt will tolerate static/dynamic standing for 5+ minutes during self care/functional activity to improve overall balance and activity tolerance  Short Term Goal 5: Pt will verbalize/demonstrate good understanding of home safety/fall prevention, EC/WS and avoiding bending/lifting/twisting for pain management to improve safety and independence with self care tasks  Occupational Therapy Plan  Times Per Week: 5-7  Current Treatment Recommendations: Self-Care / ADL, Strengthening, ROM, Balance training, Functional mobility training, Endurance training, Pain management, Safety education & training, Patient/Caregiver education & training, Equipment evaluation, education, & procurement, Positioning, Home management training    Assessment  Activity Tolerance  Activity Tolerance: Patient limited by pain, Patient limited by fatigue (frustrated with anticipated DC)  Assessment  Performance deficits / Impairments: Decreased functional mobility , Decreased ADL status,

## 2025-08-01 NOTE — PLAN OF CARE
Problem: Discharge Planning  Goal: Discharge to home or other facility with appropriate resources  8/1/2025 1838 by Ana Rosa Salazar RN  Outcome: Adequate for Discharge     Problem: Chronic Conditions and Co-morbidities  Goal: Patient's chronic conditions and co-morbidity symptoms are monitored and maintained or improved  8/1/2025 1838 by Ana Rosa Salazar RN  Outcome: Adequate for Discharge     Problem: Pain  Goal: Verbalizes/displays adequate comfort level or baseline comfort level  8/1/2025 1838 by Ana Rosa Salazar RN  Outcome: Adequate for Discharge     Problem: Skin/Tissue Integrity  Goal: Skin integrity remains intact  Description: 1.  Monitor for areas of redness and/or skin breakdown  2.  Assess vascular access sites hourly  3.  Every 4-6 hours minimum:  Change oxygen saturation probe site  4.  Every 4-6 hours:  If on nasal continuous positive airway pressure, respiratory therapy assess nares and determine need for appliance change or resting period  8/1/2025 1838 by Ana Rosa Salazar RN  Outcome: Adequate for Discharge     Problem: Safety - Adult  Goal: Free from fall injury  8/1/2025 1838 by Ana Rosa Salazar RN  Outcome: Adequate for Discharge     Problem: ABCDS Injury Assessment  Goal: Absence of physical injury  8/1/2025 1838 by Ana Rosa Salazar RN  Outcome: Adequate for Discharge     Problem: Skin/Tissue Integrity - Adult  Goal: Incisions, wounds, or drain sites healing without S/S of infection  8/1/2025 1838 by Ana Rosa Salazar RN  Outcome: Adequate for Discharge     Problem: Musculoskeletal - Adult  Goal: Return mobility to safest level of function  8/1/2025 1838 by Ana Rosa Salazar RN  Outcome: Adequate for Discharge     Problem: Musculoskeletal - Adult  Goal: Return ADL status to a safe level of function  8/1/2025 1838 by Ana Rosa Salazar RN  Outcome: Adequate for Discharge

## 2025-08-01 NOTE — PLAN OF CARE
Problem: Discharge Planning  Goal: Discharge to home or other facility with appropriate resources  8/1/2025 0134 by Maria Teresa Clinton, RN  Outcome: Progressing  Flowsheets (Taken 8/1/2025 0134)  Discharge to home or other facility with appropriate resources:   Identify barriers to discharge with patient and caregiver   Arrange for needed discharge resources and transportation as appropriate   Identify discharge learning needs (meds, wound care, etc)   Arrange for interpreters to assist at discharge as needed   Refer to discharge planning if patient needs post-hospital services based on physician order or complex needs related to functional status, cognitive ability or social support system  7/31/2025 1630 by Stephanie Kuo RN  Flowsheets (Taken 7/31/2025 1630)  Discharge to home or other facility with appropriate resources:   Arrange for needed discharge resources and transportation as appropriate   Identify barriers to discharge with patient and caregiver   Identify discharge learning needs (meds, wound care, etc)   Refer to discharge planning if patient needs post-hospital services based on physician order or complex needs related to functional status, cognitive ability or social support system   Arrange for interpreters to assist at discharge as needed     Problem: Chronic Conditions and Co-morbidities  Goal: Patient's chronic conditions and co-morbidity symptoms are monitored and maintained or improved  8/1/2025 0134 by Maria Teresa Clinton, RN  Outcome: Progressing  Flowsheets (Taken 8/1/2025 0134)  Care Plan - Patient's Chronic Conditions and Co-Morbidity Symptoms are Monitored and Maintained or Improved:   Monitor and assess patient's chronic conditions and comorbid symptoms for stability, deterioration, or improvement   Collaborate with multidisciplinary team to address chronic and comorbid conditions and prevent exacerbation or deterioration   Update acute care plan with appropriate goals

## 2025-08-01 NOTE — PLAN OF CARE
Problem: Discharge Planning  Goal: Discharge to home or other facility with appropriate resources  8/1/2025 1525 by Ana Rosa Salazar RN  Outcome: Progressing     Problem: Chronic Conditions and Co-morbidities  Goal: Patient's chronic conditions and co-morbidity symptoms are monitored and maintained or improved  8/1/2025 1525 by Ana Rosa Salazar RN  Outcome: Progressing     Problem: Pain  Goal: Verbalizes/displays adequate comfort level or baseline comfort level  8/1/2025 1525 by Ana Rosa Salazar RN  Outcome: Progressing     Problem: Skin/Tissue Integrity  Goal: Skin integrity remains intact  Description: 1.  Monitor for areas of redness and/or skin breakdown  2.  Assess vascular access sites hourly  3.  Every 4-6 hours minimum:  Change oxygen saturation probe site  4.  Every 4-6 hours:  If on nasal continuous positive airway pressure, respiratory therapy assess nares and determine need for appliance change or resting period  8/1/2025 1525 by Ana Rosa Salazar RN  Outcome: Progressing     Problem: Safety - Adult  Goal: Free from fall injury  8/1/2025 1525 by Ana Rosa Salazar RN  Outcome: Progressing     Problem: ABCDS Injury Assessment  Goal: Absence of physical injury  8/1/2025 1525 by Ana Rosa Salazar RN  Outcome: Progressing     Problem: Skin/Tissue Integrity - Adult  Goal: Incisions, wounds, or drain sites healing without S/S of infection  8/1/2025 1525 by Ana Rosa Salazar RN  Outcome: Progressing     Problem: Musculoskeletal - Adult  Goal: Return mobility to safest level of function  8/1/2025 1525 by Ana Rosa Salazar RN  Outcome: Progressing     Problem: Musculoskeletal - Adult  Goal: Return ADL status to a safe level of function  8/1/2025 1525 by Ana Rosa Salazar RN  Outcome: Progressing

## 2025-08-01 NOTE — DISCHARGE SUMMARY
Discharge Summary    Attending Physician: Turner Rojas MD  Admit Date: 7/30/2025  Discharge Date:    Primary Care Physician: Elif Ortez, APRN - CNP    Admitting Diagnosis:  Principal Problem:    Lumbar stenosis with neurogenic claudication  Active Problems:    Primary hypertension    Bilateral leg weakness    Diabetic neuropathy (HCC)    Lumbar foraminal stenosis  Resolved Problems:    * No resolved hospital problems. *        Discharge Diagnoses:  Principal Problem:    Lumbar stenosis with neurogenic claudication  Active Problems:    Primary hypertension    Bilateral leg weakness    Diabetic neuropathy (HCC)    Lumbar foraminal stenosis  Resolved Problems:    * No resolved hospital problems. 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 Damian / Neurology    Hyperlipidemia     Hypertension     Spinal stenosis of lumbar region with neurogenic claudication     Ventral hernia without obstruction or gangrene        Procedures Performed and Findings  Procedure(s):  LUMBAR LAMINECTOMY DECOMPRESSION POSTERIOR L3-S1     Consultations Obtained  IP CONSULT TO INTERNAL MEDICINE    Hospital Course  Uncomplicated    On day of discharge thought legs working better and definitely no worse    Discharge Medications       Medication List        START taking these medications      oxyCODONE-acetaminophen 5-325 MG per tablet  Commonly known as: Percocet  Take 1 tablet by mouth every 6 hours as needed for Pain for up to 7 days. Intended supply: 7 days. Take lowest dose possible to manage pain Max Daily Amount: 4 tablets            CONTINUE taking these medications      atorvastatin 40 MG tablet  Commonly known as: LIPITOR     clobetasol 0.05 % cream  Commonly known as: TEMOVATE  Apply to affected areas twice daily, as needed.  Do NOT use on face, groin, or armpits.     DULoxetine 60 MG extended release capsule  Commonly

## 2025-08-01 NOTE — CARE COORDINATION
Case Management   Daily Progress Note       Patient Name: Bandar Willard                   YOB: 1965  Diagnosis: Lumbar stenosis with neurogenic claudication [M48.062]  Lumbar foraminal stenosis [M48.061]                         days  Length of Stay: 0  days    Readmission Risk (Low < 19, Mod (19-27), High > 27): Readmission Risk Score: 7.5      Patient is alert and oriented.    Spoke with patient, and Current Transitional Plan is:    [x] Home Independently    [] Home with HC    [] Skilled Nursing Facility    [] Acute Rehabilitation    [] Long Term Acute Care (LTAC)    [] Other:     Medical Management: POD #2 lumbar laminectomy L3-S1. Interested in outpatient PT, order in place.    Additional Notes: Plan for discharge today per notes.    Electronically signed by Niyah Martins RN on 8/1/2025 at 8:40 AM

## 2025-08-03 LAB
GLUCOSE BLD-MCNC: 158 MG/DL (ref 75–110)
GLUCOSE BLD-MCNC: 171 MG/DL (ref 75–110)

## 2025-08-06 ENCOUNTER — HOSPITAL ENCOUNTER (OUTPATIENT)
Dept: PHYSICAL THERAPY | Facility: CLINIC | Age: 60
Setting detail: THERAPIES SERIES
Discharge: HOME | End: 2025-08-06
Payer: COMMERCIAL

## 2025-08-06 PROCEDURE — 97161 PT EVAL LOW COMPLEX 20 MIN: CPT

## 2025-08-06 PROCEDURE — 97110 THERAPEUTIC EXERCISES: CPT

## 2025-08-12 ENCOUNTER — OFFICE VISIT (OUTPATIENT)
Dept: ORTHOPEDIC SURGERY | Age: 60
End: 2025-08-12

## 2025-08-12 VITALS — WEIGHT: 235 LBS | RESPIRATION RATE: 14 BRPM | BODY MASS INDEX: 33.64 KG/M2 | HEIGHT: 70 IN

## 2025-08-12 DIAGNOSIS — E11.42 DIABETIC POLYNEUROPATHY ASSOCIATED WITH TYPE 2 DIABETES MELLITUS (HCC): ICD-10-CM

## 2025-08-12 DIAGNOSIS — G61.0 GUILLAIN BARRÉ SYNDROME: Primary | ICD-10-CM

## 2025-08-12 DIAGNOSIS — M48.062 LUMBAR STENOSIS WITH NEUROGENIC CLAUDICATION: ICD-10-CM

## 2025-08-12 PROCEDURE — 99024 POSTOP FOLLOW-UP VISIT: CPT | Performed by: ORTHOPAEDIC SURGERY

## 2025-08-13 ENCOUNTER — HOSPITAL ENCOUNTER (OUTPATIENT)
Dept: PHYSICAL THERAPY | Facility: CLINIC | Age: 60
Setting detail: THERAPIES SERIES
Discharge: HOME | End: 2025-08-13
Payer: COMMERCIAL

## 2025-08-13 PROCEDURE — 97116 GAIT TRAINING THERAPY: CPT

## 2025-08-13 PROCEDURE — 97110 THERAPEUTIC EXERCISES: CPT

## 2025-08-15 ENCOUNTER — HOSPITAL ENCOUNTER (OUTPATIENT)
Dept: PHYSICAL THERAPY | Facility: CLINIC | Age: 60
Setting detail: THERAPIES SERIES
Discharge: HOME | End: 2025-08-15
Payer: COMMERCIAL

## 2025-08-15 PROCEDURE — 97530 THERAPEUTIC ACTIVITIES: CPT

## 2025-08-15 PROCEDURE — 97112 NEUROMUSCULAR REEDUCATION: CPT

## 2025-08-19 ENCOUNTER — OFFICE VISIT (OUTPATIENT)
Dept: NEUROLOGY | Age: 60
End: 2025-08-19
Payer: COMMERCIAL

## 2025-08-19 VITALS
BODY MASS INDEX: 33.72 KG/M2 | HEIGHT: 70 IN | DIASTOLIC BLOOD PRESSURE: 71 MMHG | SYSTOLIC BLOOD PRESSURE: 110 MMHG | HEART RATE: 81 BPM

## 2025-08-19 DIAGNOSIS — Z86.69 HISTORY OF GUILLAIN-BARRE SYNDROME: Primary | ICD-10-CM

## 2025-08-19 DIAGNOSIS — Z98.890 HISTORY OF LUMBAR LAMINECTOMY: ICD-10-CM

## 2025-08-19 DIAGNOSIS — M54.10 POLYRADICULOPATHY: ICD-10-CM

## 2025-08-19 DIAGNOSIS — G62.9 POLYNEUROPATHY: ICD-10-CM

## 2025-08-19 DIAGNOSIS — E11.42 DIABETIC POLYNEUROPATHY ASSOCIATED WITH TYPE 2 DIABETES MELLITUS (HCC): ICD-10-CM

## 2025-08-19 PROCEDURE — 99204 OFFICE O/P NEW MOD 45 MIN: CPT | Performed by: PHYSICIAN ASSISTANT

## 2025-08-19 PROCEDURE — 3074F SYST BP LT 130 MM HG: CPT | Performed by: PHYSICIAN ASSISTANT

## 2025-08-19 PROCEDURE — 3046F HEMOGLOBIN A1C LEVEL >9.0%: CPT | Performed by: PHYSICIAN ASSISTANT

## 2025-08-19 PROCEDURE — 3078F DIAST BP <80 MM HG: CPT | Performed by: PHYSICIAN ASSISTANT

## 2025-08-19 RX ORDER — TIRZEPATIDE 12.5 MG/.5ML
INJECTION, SOLUTION SUBCUTANEOUS
COMMUNITY
Start: 2025-08-07

## 2025-08-19 RX ORDER — ACYCLOVIR 800 MG/1
TABLET ORAL
COMMUNITY
Start: 2025-08-17

## 2025-08-20 ENCOUNTER — HOSPITAL ENCOUNTER (OUTPATIENT)
Dept: PHYSICAL THERAPY | Facility: CLINIC | Age: 60
Setting detail: THERAPIES SERIES
Discharge: HOME | End: 2025-08-20
Payer: COMMERCIAL

## 2025-08-20 PROCEDURE — 97530 THERAPEUTIC ACTIVITIES: CPT

## 2025-08-20 PROCEDURE — 97112 NEUROMUSCULAR REEDUCATION: CPT

## 2025-08-20 PROCEDURE — 97110 THERAPEUTIC EXERCISES: CPT

## 2025-08-22 ENCOUNTER — HOSPITAL ENCOUNTER (OUTPATIENT)
Dept: PHYSICAL THERAPY | Facility: CLINIC | Age: 60
Setting detail: THERAPIES SERIES
Discharge: HOME | End: 2025-08-22
Payer: COMMERCIAL

## 2025-08-22 PROCEDURE — 97112 NEUROMUSCULAR REEDUCATION: CPT

## 2025-08-22 PROCEDURE — 97530 THERAPEUTIC ACTIVITIES: CPT

## 2025-08-27 ENCOUNTER — HOSPITAL ENCOUNTER (OUTPATIENT)
Dept: PHYSICAL THERAPY | Facility: CLINIC | Age: 60
Setting detail: THERAPIES SERIES
Discharge: HOME | End: 2025-08-27
Payer: COMMERCIAL

## 2025-08-27 PROCEDURE — 97110 THERAPEUTIC EXERCISES: CPT

## 2025-08-27 PROCEDURE — 97116 GAIT TRAINING THERAPY: CPT

## 2025-08-27 PROCEDURE — 97530 THERAPEUTIC ACTIVITIES: CPT

## 2025-08-29 ENCOUNTER — HOSPITAL ENCOUNTER (OUTPATIENT)
Dept: PHYSICAL THERAPY | Facility: CLINIC | Age: 60
Setting detail: THERAPIES SERIES
Discharge: HOME | End: 2025-08-29
Payer: COMMERCIAL

## 2025-08-29 PROCEDURE — 97530 THERAPEUTIC ACTIVITIES: CPT

## 2025-09-02 ENCOUNTER — HOSPITAL ENCOUNTER (OUTPATIENT)
Dept: PHYSICAL THERAPY | Facility: CLINIC | Age: 60
Setting detail: THERAPIES SERIES
Discharge: HOME | End: 2025-09-02
Payer: COMMERCIAL

## 2025-09-02 PROCEDURE — 97530 THERAPEUTIC ACTIVITIES: CPT

## 2025-09-02 PROCEDURE — 97110 THERAPEUTIC EXERCISES: CPT

## 2025-09-05 ENCOUNTER — HOSPITAL ENCOUNTER (OUTPATIENT)
Dept: PHYSICAL THERAPY | Facility: CLINIC | Age: 60
Setting detail: THERAPIES SERIES
Discharge: HOME | End: 2025-09-05
Payer: COMMERCIAL

## 2025-09-05 PROCEDURE — 97110 THERAPEUTIC EXERCISES: CPT

## 2025-09-05 PROCEDURE — 97530 THERAPEUTIC ACTIVITIES: CPT

## (undated) DEVICE — SOLUTION IRRIG 1000ML 09% SOD CHL USP PIC PLAS CONTAINER

## (undated) DEVICE — GLOVE SURG SZ 8 L12IN FNGR THK79MIL GRN LTX FREE

## (undated) DEVICE — SINGLE PORT MANIFOLD: Brand: NEPTUNE 2

## (undated) DEVICE — 5.0MM X-COARSE DIAMOND

## (undated) DEVICE — SYRINGE MED 10ML LUERLOCK TIP W/O SFTY DISP

## (undated) DEVICE — GLOVE ORTHO 8   MSG9480

## (undated) DEVICE — SUTURE VICRYL + SZ 2 L27IN ABSRB UD L40MM CP 1/2 CIR REV CUT VCP195H

## (undated) DEVICE — YANKAUER,SMOOTH HANDLE,HIGH CAPACITY: Brand: MEDLINE INDUSTRIES, INC.

## (undated) DEVICE — Device

## (undated) DEVICE — ST CHARLES DR BEEKS SPINE: Brand: MEDLINE INDUSTRIES, INC.

## (undated) DEVICE — TUBING, SUCTION, 9/32" X 12', STRAIGHT: Brand: MEDLINE INDUSTRIES, INC.

## (undated) DEVICE — SOLUTION IRRIG 1000ML H2O PIC PLAS SHATTERPROOF CONTAINER

## (undated) DEVICE — SUTURE VICRYL + SZ 2-0 L27IN ABSRB UD CP-1 1/2 CIR REV CUT VCP266H

## (undated) DEVICE — SHEET,DRAPE,53X77,STERILE: Brand: MEDLINE

## (undated) DEVICE — BLANKET WRM W40.2XL55.9IN IORT LO BODY + MISTRAL AIR